# Patient Record
Sex: MALE | Race: WHITE | URBAN - METROPOLITAN AREA
[De-identification: names, ages, dates, MRNs, and addresses within clinical notes are randomized per-mention and may not be internally consistent; named-entity substitution may affect disease eponyms.]

---

## 2019-01-01 ENCOUNTER — NURSING HOME VISIT (OUTPATIENT)
Dept: GERIATRICS | Facility: CLINIC | Age: 84
End: 2019-01-01
Payer: COMMERCIAL

## 2019-01-01 ENCOUNTER — HOSPITAL LABORATORY (OUTPATIENT)
Dept: NURSING HOME | Facility: OTHER | Age: 84
End: 2019-01-01

## 2019-01-01 ENCOUNTER — NURSING HOME VISIT (OUTPATIENT)
Dept: SURGERY | Facility: CLINIC | Age: 84
End: 2019-01-01
Payer: COMMERCIAL

## 2019-01-01 ENCOUNTER — TELEPHONE (OUTPATIENT)
Dept: GERIATRICS | Facility: CLINIC | Age: 84
End: 2019-01-01

## 2019-01-01 VITALS
HEART RATE: 84 BPM | OXYGEN SATURATION: 98 % | WEIGHT: 212.4 LBS | DIASTOLIC BLOOD PRESSURE: 65 MMHG | RESPIRATION RATE: 20 BRPM | SYSTOLIC BLOOD PRESSURE: 137 MMHG | TEMPERATURE: 96.8 F | HEIGHT: 74 IN | BODY MASS INDEX: 27.26 KG/M2

## 2019-01-01 VITALS
SYSTOLIC BLOOD PRESSURE: 141 MMHG | RESPIRATION RATE: 20 BRPM | WEIGHT: 206.4 LBS | BODY MASS INDEX: 26.49 KG/M2 | DIASTOLIC BLOOD PRESSURE: 79 MMHG | OXYGEN SATURATION: 98 % | HEART RATE: 86 BPM | TEMPERATURE: 97.3 F | HEIGHT: 74 IN

## 2019-01-01 VITALS
TEMPERATURE: 96.3 F | DIASTOLIC BLOOD PRESSURE: 74 MMHG | BODY MASS INDEX: 25.77 KG/M2 | HEIGHT: 74 IN | RESPIRATION RATE: 18 BRPM | WEIGHT: 200.8 LBS | OXYGEN SATURATION: 94 % | SYSTOLIC BLOOD PRESSURE: 137 MMHG | HEART RATE: 81 BPM

## 2019-01-01 VITALS
TEMPERATURE: 98.4 F | RESPIRATION RATE: 17 BRPM | SYSTOLIC BLOOD PRESSURE: 132 MMHG | WEIGHT: 200.8 LBS | HEART RATE: 76 BPM | DIASTOLIC BLOOD PRESSURE: 78 MMHG | BODY MASS INDEX: 25.78 KG/M2 | OXYGEN SATURATION: 94 %

## 2019-01-01 VITALS
WEIGHT: 212.4 LBS | SYSTOLIC BLOOD PRESSURE: 118 MMHG | TEMPERATURE: 97.7 F | HEIGHT: 74 IN | HEART RATE: 75 BPM | DIASTOLIC BLOOD PRESSURE: 50 MMHG | BODY MASS INDEX: 27.26 KG/M2 | RESPIRATION RATE: 18 BRPM | OXYGEN SATURATION: 98 %

## 2019-01-01 VITALS
HEART RATE: 70 BPM | TEMPERATURE: 97.2 F | RESPIRATION RATE: 16 BRPM | SYSTOLIC BLOOD PRESSURE: 125 MMHG | OXYGEN SATURATION: 97 % | DIASTOLIC BLOOD PRESSURE: 64 MMHG | WEIGHT: 198.4 LBS | BODY MASS INDEX: 25.46 KG/M2 | HEIGHT: 74 IN

## 2019-01-01 DIAGNOSIS — L92.9 HYPERGRANULATION: ICD-10-CM

## 2019-01-01 DIAGNOSIS — N13.9 OBSTRUCTION, UROPATHY: ICD-10-CM

## 2019-01-01 DIAGNOSIS — L20.9 ATOPIC DERMATITIS, UNSPECIFIED TYPE: ICD-10-CM

## 2019-01-01 DIAGNOSIS — Z87.820 HISTORY OF CONCUSSION: ICD-10-CM

## 2019-01-01 DIAGNOSIS — M79.604 PAIN OF RIGHT LOWER EXTREMITY: ICD-10-CM

## 2019-01-01 DIAGNOSIS — Z87.440 HISTORY OF RECURRENT UTIS: ICD-10-CM

## 2019-01-01 DIAGNOSIS — G80.2 SPASTIC HEMIPLEGIC CEREBRAL PALSY (H): Primary | ICD-10-CM

## 2019-01-01 DIAGNOSIS — Z93.59 SUPRAPUBIC CATHETER (H): ICD-10-CM

## 2019-01-01 DIAGNOSIS — G80.2 SPASTIC HEMIPLEGIC CEREBRAL PALSY (H): ICD-10-CM

## 2019-01-01 DIAGNOSIS — I10 ESSENTIAL HYPERTENSION: ICD-10-CM

## 2019-01-01 DIAGNOSIS — L03.114 CELLULITIS OF LEFT UPPER EXTREMITY: ICD-10-CM

## 2019-01-01 DIAGNOSIS — I71.40 ABDOMINAL AORTIC ANEURYSM (AAA) WITHOUT RUPTURE (H): ICD-10-CM

## 2019-01-01 DIAGNOSIS — L30.9 DERMATITIS: ICD-10-CM

## 2019-01-01 DIAGNOSIS — L02.92 BOIL: Primary | ICD-10-CM

## 2019-01-01 DIAGNOSIS — N18.30 CKD (CHRONIC KIDNEY DISEASE) STAGE 3, GFR 30-59 ML/MIN (H): ICD-10-CM

## 2019-01-01 DIAGNOSIS — R39.89 POSSIBLE URINARY TRACT INFECTION: ICD-10-CM

## 2019-01-01 DIAGNOSIS — R41.89 COGNITIVE IMPAIRMENT: ICD-10-CM

## 2019-01-01 DIAGNOSIS — Z98.890 S/P CAROTID ENDARTERECTOMY: ICD-10-CM

## 2019-01-01 DIAGNOSIS — I25.83 CORONARY ATHEROSCLEROSIS DUE TO LIPID RICH PLAQUE: ICD-10-CM

## 2019-01-01 DIAGNOSIS — M62.81 GENERALIZED MUSCLE WEAKNESS: ICD-10-CM

## 2019-01-01 DIAGNOSIS — R41.0 ACUTE CONFUSION: Primary | ICD-10-CM

## 2019-01-01 DIAGNOSIS — Z86.14 HX MRSA INFECTION: ICD-10-CM

## 2019-01-01 DIAGNOSIS — L02.92 BOIL: ICD-10-CM

## 2019-01-01 DIAGNOSIS — R31.9 HEMATURIA, UNSPECIFIED TYPE: ICD-10-CM

## 2019-01-01 DIAGNOSIS — R54 FRAILTY: ICD-10-CM

## 2019-01-01 DIAGNOSIS — Z43.5 ENCOUNTER FOR SUPRAPUBIC CATHETER CARE (H): Primary | ICD-10-CM

## 2019-01-01 DIAGNOSIS — R54 FRAIL ELDERLY: ICD-10-CM

## 2019-01-01 LAB
ALBUMIN UR-MCNC: 100 MG/DL
ANION GAP SERPL CALCULATED.3IONS-SCNC: 7 MMOL/L (ref 3–14)
ANION GAP SERPL CALCULATED.3IONS-SCNC: 9 MMOL/L (ref 3–14)
APPEARANCE UR: ABNORMAL
BACTERIA #/AREA URNS HPF: ABNORMAL /HPF
BACTERIA SPEC CULT: ABNORMAL
BASOPHILS # BLD AUTO: 0 10E9/L (ref 0–0.2)
BASOPHILS # BLD AUTO: 0 10E9/L (ref 0–0.2)
BASOPHILS NFR BLD AUTO: 0.3 %
BASOPHILS NFR BLD AUTO: 0.4 %
BILIRUB UR QL STRIP: NEGATIVE
BUN SERPL-MCNC: 15 MG/DL (ref 7–30)
BUN SERPL-MCNC: 21 MG/DL (ref 7–30)
CALCIUM SERPL-MCNC: 8.9 MG/DL (ref 8.5–10.1)
CALCIUM SERPL-MCNC: 9.4 MG/DL (ref 8.5–10.1)
CAOX CRY #/AREA URNS HPF: ABNORMAL /HPF
CHLORIDE SERPL-SCNC: 106 MMOL/L (ref 94–109)
CHLORIDE SERPL-SCNC: 111 MMOL/L (ref 94–109)
CO2 SERPL-SCNC: 25 MMOL/L (ref 20–32)
CO2 SERPL-SCNC: 28 MMOL/L (ref 20–32)
COLOR UR AUTO: YELLOW
CREAT SERPL-MCNC: 0.94 MG/DL (ref 0.66–1.25)
CREAT SERPL-MCNC: 1.38 MG/DL (ref 0.66–1.25)
DIFFERENTIAL METHOD BLD: ABNORMAL
DIFFERENTIAL METHOD BLD: ABNORMAL
EOSINOPHIL NFR BLD AUTO: 10.9 %
EOSINOPHIL NFR BLD AUTO: 7 %
ERYTHROCYTE [DISTWIDTH] IN BLOOD BY AUTOMATED COUNT: 15.9 % (ref 10–15)
ERYTHROCYTE [DISTWIDTH] IN BLOOD BY AUTOMATED COUNT: 17.5 % (ref 10–15)
ERYTHROCYTE [DISTWIDTH] IN BLOOD BY AUTOMATED COUNT: 17.7 % (ref 10–15)
GFR SERPL CREATININE-BSD FRML MDRD: 46 ML/MIN/{1.73_M2}
GFR SERPL CREATININE-BSD FRML MDRD: 72 ML/MIN/{1.73_M2}
GLUCOSE SERPL-MCNC: 76 MG/DL (ref 70–99)
GLUCOSE SERPL-MCNC: 83 MG/DL (ref 70–99)
GLUCOSE UR STRIP-MCNC: NEGATIVE MG/DL
HCT VFR BLD AUTO: 30.1 % (ref 40–53)
HCT VFR BLD AUTO: 30.2 % (ref 40–53)
HCT VFR BLD AUTO: 30.3 % (ref 40–53)
HGB BLD-MCNC: 9.6 G/DL (ref 13.3–17.7)
HGB BLD-MCNC: 9.8 G/DL (ref 13.3–17.7)
HGB BLD-MCNC: 9.8 G/DL (ref 13.3–17.7)
HGB UR QL STRIP: ABNORMAL
HYALINE CASTS #/AREA URNS LPF: 8 /LPF (ref 0–2)
IMM GRANULOCYTES # BLD: 0 10E9/L (ref 0–0.4)
IMM GRANULOCYTES # BLD: 0 10E9/L (ref 0–0.4)
IMM GRANULOCYTES NFR BLD: 0.4 %
IMM GRANULOCYTES NFR BLD: 0.4 %
KETONES UR STRIP-MCNC: NEGATIVE MG/DL
LEUKOCYTE ESTERASE UR QL STRIP: ABNORMAL
LYMPHOCYTES # BLD AUTO: 1.2 10E9/L (ref 0.8–5.3)
LYMPHOCYTES # BLD AUTO: 1.6 10E9/L (ref 0.8–5.3)
LYMPHOCYTES NFR BLD AUTO: 16.5 %
LYMPHOCYTES NFR BLD AUTO: 21.2 %
Lab: ABNORMAL
MCH RBC QN AUTO: 27.6 PG (ref 26.5–33)
MCH RBC QN AUTO: 28 PG (ref 26.5–33)
MCH RBC QN AUTO: 28.2 PG (ref 26.5–33)
MCHC RBC AUTO-ENTMCNC: 31.8 G/DL (ref 31.5–36.5)
MCHC RBC AUTO-ENTMCNC: 32.3 G/DL (ref 31.5–36.5)
MCHC RBC AUTO-ENTMCNC: 32.6 G/DL (ref 31.5–36.5)
MCV RBC AUTO: 87 FL (ref 78–100)
MONOCYTES # BLD AUTO: 0.7 10E9/L (ref 0–1.3)
MONOCYTES # BLD AUTO: 1 10E9/L (ref 0–1.3)
MONOCYTES NFR BLD AUTO: 10 %
MONOCYTES NFR BLD AUTO: 13.4 %
NEUTROPHILS # BLD AUTO: 4.3 10E9/L (ref 1.6–8.3)
NEUTROPHILS # BLD AUTO: 4.5 10E9/L (ref 1.6–8.3)
NEUTROPHILS NFR BLD AUTO: 58.5 %
NEUTROPHILS NFR BLD AUTO: 61 %
NITRATE UR QL: NEGATIVE
NRBC # BLD AUTO: 0 10*3/UL
NRBC # BLD AUTO: 0 10*3/UL
NRBC BLD AUTO-RTO: 0 /100
NRBC BLD AUTO-RTO: 0 /100
PH UR STRIP: 6 PH (ref 5–7)
PLATELET # BLD AUTO: 198 10E9/L (ref 150–450)
PLATELET # BLD AUTO: 229 10E9/L (ref 150–450)
PLATELET # BLD AUTO: 236 10E9/L (ref 150–450)
POTASSIUM SERPL-SCNC: 4.1 MMOL/L (ref 3.4–5.3)
POTASSIUM SERPL-SCNC: 4.3 MMOL/L (ref 3.4–5.3)
RBC # BLD AUTO: 3.48 10E12/L (ref 4.4–5.9)
RBC # BLD AUTO: 3.48 10E12/L (ref 4.4–5.9)
RBC # BLD AUTO: 3.5 10E12/L (ref 4.4–5.9)
RBC #/AREA URNS AUTO: 147 /HPF (ref 0–2)
SODIUM SERPL-SCNC: 141 MMOL/L (ref 133–144)
SODIUM SERPL-SCNC: 145 MMOL/L (ref 133–144)
SOURCE: ABNORMAL
SP GR UR STRIP: 1.01 (ref 1–1.03)
SPECIMEN SOURCE: ABNORMAL
TRANS CELLS #/AREA URNS HPF: 2 /HPF
UROBILINOGEN UR STRIP-MCNC: 0 MG/DL (ref 0–2)
WBC # BLD AUTO: 6.4 10E9/L (ref 4–11)
WBC # BLD AUTO: 7.3 10E9/L (ref 4–11)
WBC # BLD AUTO: 7.3 10E9/L (ref 4–11)
WBC #/AREA URNS AUTO: >182 /HPF (ref 0–5)
WBC CLUMPS #/AREA URNS HPF: PRESENT /HPF

## 2019-01-01 PROCEDURE — 99304 1ST NF CARE SF/LOW MDM 25: CPT | Mod: 25 | Performed by: SPECIALIST

## 2019-01-01 PROCEDURE — 99309 SBSQ NF CARE MODERATE MDM 30: CPT | Performed by: NURSE PRACTITIONER

## 2019-01-01 PROCEDURE — 99305 1ST NF CARE MODERATE MDM 35: CPT | Performed by: SPECIALIST

## 2019-01-01 PROCEDURE — 10060 I&D ABSCESS SIMPLE/SINGLE: CPT | Performed by: NURSE PRACTITIONER

## 2019-01-01 PROCEDURE — 17250 CHEM CAUT OF GRANLTJ TISSUE: CPT | Performed by: SPECIALIST

## 2019-01-01 PROCEDURE — 99309 SBSQ NF CARE MODERATE MDM 30: CPT | Mod: 25 | Performed by: NURSE PRACTITIONER

## 2019-01-01 PROCEDURE — 99309 SBSQ NF CARE MODERATE MDM 30: CPT | Performed by: FAMILY MEDICINE

## 2019-01-01 PROCEDURE — 99308 SBSQ NF CARE LOW MDM 20: CPT | Performed by: NURSE PRACTITIONER

## 2019-01-01 RX ORDER — SULFAMETHOXAZOLE/TRIMETHOPRIM 800-160 MG
1 TABLET ORAL 2 TIMES DAILY
COMMUNITY
Start: 2019-01-01 | End: 2019-01-01

## 2019-01-01 RX ORDER — CEFDINIR 300 MG/1
300 CAPSULE ORAL 2 TIMES DAILY
COMMUNITY
End: 2019-01-01

## 2019-01-01 RX ORDER — MINERAL OIL/HYDROPHIL PETROLAT
OINTMENT (GRAM) TOPICAL 2 TIMES DAILY
COMMUNITY
End: 2019-01-01

## 2019-01-01 RX ORDER — RIFAMPIN 300 MG/1
300 CAPSULE ORAL 2 TIMES DAILY
COMMUNITY
Start: 2019-01-01 | End: 2020-01-01

## 2019-01-01 RX ORDER — LIDOCAINE 40 MG/G
CREAM TOPICAL 2 TIMES DAILY
Status: ON HOLD | COMMUNITY
End: 2020-01-01

## 2019-01-01 RX ORDER — MOMETASONE FUROATE 1 MG/G
OINTMENT TOPICAL 2 TIMES DAILY
Status: ON HOLD | COMMUNITY
End: 2020-01-01

## 2019-01-01 RX ORDER — ACETAMINOPHEN 500 MG
1000 TABLET ORAL 3 TIMES DAILY PRN
Status: ON HOLD
Start: 2019-01-01 | End: 2020-01-01

## 2019-01-01 ASSESSMENT — MIFFLIN-ST. JEOR
SCORE: 1644.69
SCORE: 1680.97
SCORE: 1708.19
SCORE: 1655.57
SCORE: 1708.19

## 2019-03-12 ENCOUNTER — NURSING HOME VISIT (OUTPATIENT)
Dept: GERIATRICS | Facility: CLINIC | Age: 84
End: 2019-03-12
Payer: COMMERCIAL

## 2019-03-12 DIAGNOSIS — I25.83 CORONARY ATHEROSCLEROSIS DUE TO LIPID RICH PLAQUE: ICD-10-CM

## 2019-03-12 DIAGNOSIS — L89.153 DECUBITUS ULCER OF COCCYX, STAGE III (H): ICD-10-CM

## 2019-03-12 DIAGNOSIS — I71.40 ABDOMINAL AORTIC ANEURYSM (AAA) WITHOUT RUPTURE (H): ICD-10-CM

## 2019-03-12 DIAGNOSIS — B37.2 CANDIDIASIS OF SKIN: ICD-10-CM

## 2019-03-12 DIAGNOSIS — Z98.890 S/P CAROTID ENDARTERECTOMY: ICD-10-CM

## 2019-03-12 DIAGNOSIS — N13.9 OBSTRUCTION, UROPATHY: ICD-10-CM

## 2019-03-12 DIAGNOSIS — I10 ESSENTIAL HYPERTENSION: ICD-10-CM

## 2019-03-12 DIAGNOSIS — G80.2 SPASTIC HEMIPLEGIC CEREBRAL PALSY (H): Primary | ICD-10-CM

## 2019-03-12 DIAGNOSIS — S06.0X0D CONCUSSION WITHOUT LOSS OF CONSCIOUSNESS, SUBSEQUENT ENCOUNTER: ICD-10-CM

## 2019-03-12 PROBLEM — G80.9 CEREBRAL PALSY (H): Status: ACTIVE | Noted: 2019-03-02

## 2019-03-12 PROBLEM — I25.10 CORONARY ATHEROSCLEROSIS: Status: ACTIVE | Noted: 2018-09-06
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
## 2019-03-12 PROBLEM — S06.0XAA CONCUSSION: Status: ACTIVE | Noted: 2019-03-02

## 2019-03-12 PROBLEM — R33.9 RETENTION OF URINE: Status: ACTIVE | Noted: 2019-03-12

## 2019-03-12 PROCEDURE — 99310 SBSQ NF CARE HIGH MDM 45: CPT | Performed by: NURSE PRACTITIONER

## 2019-03-12 NOTE — PROGRESS NOTES
[facility]  PRIMARY CARE PROVIDER AND CLINIC:  [facility], [address] / [place] [address]  Chief Complaint   Patient presents with     Hospital F/U     Clinic Care Coordination - Initial     [facility] Medical Record Number:  [id number]  Place of Service where encounter took place:  [facility] ([facility] [[id number]]    Diallo Villarreal  is a 86 year old  (8/31/1932), admitted to the above facility from  [facility] . Hospital stay 3/2/19 through 3/8/19..  Admitted to this facility for  rehab, medical management and nursing care.    HPI:    HPI information obtained from: facility chart records, facility staff, patient report and Care Everywhere Epic chart review.   Brief Summary of Hospital Course: Reviewed discharge summary from [facility].  Diallo lives in a group home setting and admitted due to post concussion symptoms with disequilibrium.  While he was ambulating to the bathroom he sustained a fall where he hit his head.  CT of head, cervical spine and temporal bones were negative for fracture or acute bleed.  Found to be hypovolumic.  Fluids replaced and diuretic held.  Able to participate in therapies and so recommended for rehab for strengthening and mobility.  Diallo had stenting in 2018 for CAD.  Continued beta blocker, ASA, Plavix and statin.    Armendarzi present due to retention and on a alpha blocker.    Updates on Status Since Skilled nursing Admission: - today nursing asked this NP to see Diallo right away before staff got up for the day due to his skin condition.  Multiple alterations on his legs, feet, coccyx and groin/periarea.    Diallo was laying in bed, awake and answering questions.  Asked him if he had contractures of his hands but able to spread out fingers.  Does have Cerebral Palsy and chronic right sided weakness which gives him trouble walking.    Goal at this time is to utilize rehab.  Possible long term.      CODE  STATUS/ADVANCE DIRECTIVES DISCUSSION:   DNR / DNI  Patient's living condition: group home setting  ALLERGIES: Gluten meal and Wheat extract  PAST MEDICAL HISTORY:  has no past medical history on file.  PAST SURGICAL HISTORY:   has no past surgical history on file.  FAMILY HISTORY: family history is not on file.  SOCIAL HISTORY:       Post Discharge Medication Reconciliation Status: discharge medications reconciled, continue medications without change  Did add other medications for his skin    Current Outpatient Medications   Medication Sig Dispense Refill     albuterol (PROVENTIL) (2.5 MG/3ML) 0.083% neb solution Take 2.5 mg by nebulization every 4 hours as needed for shortness of breath / dyspnea or wheezing       aspirin 81 MG EC tablet Take 81 mg by mouth daily       atorvastatin (LIPITOR) 40 MG tablet Take 40 mg by mouth daily       cinacalcet (SENSIPAR) 30 MG tablet Take 30 mg by mouth daily       clopidogrel (PLAVIX) 75 MG tablet Take 75 mg by mouth daily       clotrimazole (LOTRIMIN) 1 % external cream Apply topically 2 times daily       clotrimazole-betamethasone (LOTRISONE) 1-0.05 % external cream Apply topically 2 times daily       doxazosin (CARDURA) 1 MG tablet Take 0.5 mg by mouth daily       ferrous sulfate (FEROSUL) 325 (65 Fe) MG tablet Take 325 mg by mouth daily (with breakfast)       furosemide (LASIX) 20 MG tablet Take 20 mg by mouth daily       metoprolol tartrate (LOPRESSOR) 50 MG tablet Take 50 mg by mouth 2 times daily       nitroGLYcerin (NITROSTAT) 0.4 MG sublingual tablet Place 0.4 mg under the tongue every 5 minutes as needed for chest pain For chest pain place 1 tablet under the tongue every 5 minutes for 3 doses. If symptoms persist 5 minutes after 1st dose call 911.       pantoprazole sodium (PROTONIX) 40 MG packet Take 1 packet by mouth daily       senna-docusate (SENOKOT-S/PERICOLACE) 8.6-50 MG tablet Take 2 tablets by mouth 2 times daily as needed for constipation       tamsulosin  "(FLOMAX) 0.4 MG capsule Take 0.4 mg by mouth daily       vitamin D3 (CHOLECALCIFEROL) 1000 units (25 mcg) tablet Take 2,000 Units by mouth daily         ROS:  10 point ROS of systems including Constitutional, Eyes, Respiratory, Cardiovascular, Gastroenterology, Genitourinary, Integumentary, Musculoskeletal, Psychiatric were all negative except for pertinent positives noted in my HPI.  Able to answer simple questions    Vitals:  /64   Pulse 70   Temp 96.4  F (35.8  C)   Resp 16   Ht 1.88 m (6' 2\")   Wt 92.3 kg (203 lb 8 oz)   SpO2 95%   BMI 26.13 kg/m    Exam:  GENERAL APPEARANCE:  Alert, in no distress, appears healthy, cooperative  EYES:  PERRL, Conjunctiva and lids normal  RESP:  respiratory effort and palpation of chest normal, no respiratory distress, lungs are clear in the bases but by the bronchial tree, do hear mild wheezing and congestion.  no coughing.  hx of smoking 40 years.  suspect underlying COPD according to discharge summaries  CV:  Palpation and auscultation of heart done , no edema, heart rate regular/irregular  ABDOMEN:  normal bowel sounds, soft, nontender, no hepatosplenomegaly or other masses, no guarding or rebound  M/S:   Gait and station abnormal right sided weakness, assist of one with transfers and bed mobility, propelled in w/c  SKIN:  resolving bruising around left eye.  Skin is pink/pale.  On his coccyx area there is a stage 3 pressure sore that base of skin pink and edges white.  No dressing was present.  Did have two other nurses go into see the area to agree on what the area is to be called.  Felt it was a dimple but the nurses felt to be a pressure sore.  In hospital records it is recorded as a community acquired pressure sore stage 3.  Back of left knee and top of left foot is deeper pink and dry/irritated.    Right thigh has a large deep pink raised plaque - fungus that is irritated.  Dry elbows.  No issues with heels  Bilateral groin and jose-area is deep pink, " moist/drainage and crusting on the edges.    At the bend of the feet is dry scale skin  PSYCH:  oriented to person and place with time vague, affect and mood normal    Lab/Diagnostic data:  3/8/19 through 3/2/19  7.5 (L) 8.7 (L) 7.9 (L) 8.1 (L) 7.2 (L) 8.0 (L)     1/11/19  BNP = 238      ASSESSMENT/PLAN:  Current [place] scheduled appointments:   none    Spastic hemiplegic cerebral palsy (H)  Chronic right sided weakness from his CP.    No medications.    Will be receiving rehab of PT/OT for mobility, strengthening, exercises and ADLs.  [place] that he may be long term.  Family has toured this facility prior to Diallo coming here.    Assist with cares, transfers and w/c.      Concussion without loss of consciousness, subsequent encounter  According to the discharge summary his equilibrium imbalance resolved before leaving hospital.    Continue to monitor for acute symptoms.  Able to tolerate the lights in his room this morning.  Able to express his needs and answer questions.    Coronary atherosclerosis due to lipid rich plaque  S/P carotid endarterectomy  Remains on ASA, Plavix and atorvastatin daily.    Noted to have a AAA without rupture as well.  This has been chosen not to surgically correct according to discharge paperwork.    Will check a CBC, BMP, TSH, and lipid panel on Friday.    Does have nitroglycerin PRN for angina  Will add x3 to the order  Every 5 minutes prn x3.    Obstruction, uropathy  Came with a catheter for retention.  According to staff that he can not have a suprapubic catheter for sometime this summer due to being on anticoagulants.  Staff will care for catheter bag as well as the skin on his penis due to how his groin and jose-area appears.  Is on doxazosin 0.5mg daily and tamsulosin 0.4mg daily.        Candidiasis of skin  Decubitus of coccyx, stage 3 - community acquired  Multiple issues to address:  For his thigh - will use clobetasol cream twice a day for 14 days.  Cavilon extra dry skin  cream for dry skin on feet and ankle area.  For the groin and jose area, clotrimazole 1% BID for 14 days  For the coccyx area will keep clean and apply Cavilon wipe twice a day.  Continue to monitor as will need to change tx as needed.    Essential hypertension  Remains on Lopressor 50mg twice a day and has Lasix 20mg daily.  No edema.      Abdominal aortic aneurysm (AAA) without rupture (H)  Continue to be aware that he has a AAA that measures 6.4cm back in 2018.       Orders written by provider at facility and transcribed by : Magaly Polo  1.  Catheter for obstructive uropathy.  Add x 3 to nitroglycerin order.  2.  Discontinue albuterol inhaler.  3.  Albuterol neb 1 sunni Q4H PRN.  Dx: wheezing, congestion  4.  CBC, BMP, TSH, and Lipids on 3/15/19 for CAD, HTN     Total time spent with patient visit at the skilled nursing facility was 40 minutes including patient visit, review of past records and discussion with nursing. Greater than 50% of total time spent with counseling and coordinating care due to skin issues, clarifying medications and new patient orientation     Electronically signed by:  ROCÍO Pena CNP

## 2019-03-13 VITALS
OXYGEN SATURATION: 95 % | HEIGHT: 74 IN | SYSTOLIC BLOOD PRESSURE: 127 MMHG | BODY MASS INDEX: 26.12 KG/M2 | TEMPERATURE: 96.4 F | HEART RATE: 70 BPM | WEIGHT: 203.5 LBS | RESPIRATION RATE: 16 BRPM | DIASTOLIC BLOOD PRESSURE: 64 MMHG

## 2019-03-13 RX ORDER — FERROUS SULFATE 325(65) MG
325 TABLET ORAL
Status: ON HOLD | COMMUNITY
End: 2020-01-01

## 2019-03-13 RX ORDER — CLOPIDOGREL BISULFATE 75 MG/1
75 TABLET ORAL DAILY
COMMUNITY
End: 2019-08-09

## 2019-03-13 RX ORDER — CLOTRIMAZOLE 1 %
CREAM (GRAM) TOPICAL 2 TIMES DAILY
COMMUNITY
End: 2019-08-09

## 2019-03-13 RX ORDER — ALBUTEROL SULFATE 0.83 MG/ML
2.5 SOLUTION RESPIRATORY (INHALATION) EVERY 4 HOURS PRN
COMMUNITY
End: 2020-01-01

## 2019-03-13 RX ORDER — ATORVASTATIN CALCIUM 40 MG/1
40 TABLET, FILM COATED ORAL DAILY
COMMUNITY
End: 2020-01-01

## 2019-03-13 RX ORDER — PANTOPRAZOLE SODIUM 40 MG/1
1 FOR SUSPENSION ORAL DAILY
COMMUNITY
End: 2019-03-20

## 2019-03-13 RX ORDER — METOPROLOL TARTRATE 50 MG
50 TABLET ORAL 2 TIMES DAILY
Status: ON HOLD | COMMUNITY
End: 2020-01-01

## 2019-03-13 RX ORDER — FUROSEMIDE 20 MG
10 TABLET ORAL DAILY
Status: ON HOLD | COMMUNITY
End: 2020-01-01

## 2019-03-13 RX ORDER — TAMSULOSIN HYDROCHLORIDE 0.4 MG/1
0.4 CAPSULE ORAL DAILY
COMMUNITY
End: 2019-09-05

## 2019-03-13 RX ORDER — NITROGLYCERIN 0.4 MG/1
0.4 TABLET SUBLINGUAL EVERY 5 MIN PRN
Status: ON HOLD | COMMUNITY
End: 2020-01-01

## 2019-03-13 RX ORDER — ASPIRIN 81 MG/1
81 TABLET ORAL DAILY
Status: ON HOLD | COMMUNITY
End: 2020-01-01

## 2019-03-13 RX ORDER — CLOTRIMAZOLE AND BETAMETHASONE DIPROPIONATE 10; .64 MG/G; MG/G
CREAM TOPICAL 2 TIMES DAILY
COMMUNITY
End: 2019-04-02

## 2019-03-13 RX ORDER — AMOXICILLIN 250 MG
2 CAPSULE ORAL 2 TIMES DAILY PRN
COMMUNITY
End: 2019-03-20

## 2019-03-13 RX ORDER — DOXAZOSIN 1 MG/1
0.5 TABLET ORAL DAILY
COMMUNITY
End: 2019-09-05

## 2019-03-13 RX ORDER — CINACALCET 30 MG/1
30 TABLET, FILM COATED ORAL DAILY
Status: ON HOLD | COMMUNITY
End: 2020-01-01

## 2019-03-13 ASSESSMENT — MIFFLIN-ST. JEOR: SCORE: 1672.82

## 2019-03-14 ENCOUNTER — NURSING HOME VISIT (OUTPATIENT)
Dept: GERIATRICS | Facility: CLINIC | Age: 84
End: 2019-03-14
Payer: COMMERCIAL

## 2019-03-14 VITALS
RESPIRATION RATE: 16 BRPM | HEIGHT: 74 IN | SYSTOLIC BLOOD PRESSURE: 141 MMHG | OXYGEN SATURATION: 94 % | HEART RATE: 73 BPM | WEIGHT: 202 LBS | BODY MASS INDEX: 25.93 KG/M2 | TEMPERATURE: 96.3 F | DIASTOLIC BLOOD PRESSURE: 70 MMHG

## 2019-03-14 DIAGNOSIS — B37.2 CANDIDIASIS OF SKIN: Primary | ICD-10-CM

## 2019-03-14 DIAGNOSIS — L89.153 DECUBITUS ULCER OF COCCYX, STAGE III (H): ICD-10-CM

## 2019-03-14 PROCEDURE — 99309 SBSQ NF CARE MODERATE MDM 30: CPT | Performed by: NURSE PRACTITIONER

## 2019-03-14 ASSESSMENT — MIFFLIN-ST. JEOR: SCORE: 1666.02

## 2019-03-14 NOTE — PROGRESS NOTES
[facility]  [facility] Medical Record Number:  [id number]  Place of Service where encounter took place[facility] [[id number][facility]  Chief Complaint   Patient presents with     Nursing Home Acute       HPI:    Diallo Villarreal  is a 86 year old (8/31/1932), who is being seen today for an episodic care visit.  HPI information obtained from: facility staff and patient report. Today's concern is:    1. Candidiasis of skin    2. Decubitus ulcer of coccyx, stage III (H)      - came to see Diallo today due to he was about to get a shower and the NA/R asked nursing to come look at his skin per their protocol and asked if this NP wanted to see.    Saw Diallo 2 days ago and his skin looked painful with the redness, drainage, irritation.    Past Medical and Surgical History reviewed in Epic today.    MEDICATIONS:  Current Outpatient Medications   Medication Sig Dispense Refill     albuterol (PROVENTIL) (2.5 MG/3ML) 0.083% neb solution Take 2.5 mg by nebulization every 4 hours as needed for shortness of breath / dyspnea or wheezing       aspirin 81 MG EC tablet Take 81 mg by mouth daily       atorvastatin (LIPITOR) 40 MG tablet Take 40 mg by mouth daily       cinacalcet (SENSIPAR) 30 MG tablet Take 30 mg by mouth daily       clopidogrel (PLAVIX) 75 MG tablet Take 75 mg by mouth daily       clotrimazole (LOTRIMIN) 1 % external cream Apply topically 2 times daily       clotrimazole-betamethasone (LOTRISONE) 1-0.05 % external cream Apply topically 2 times daily       doxazosin (CARDURA) 1 MG tablet Take 0.5 mg by mouth daily       ferrous sulfate (FEROSUL) 325 (65 Fe) MG tablet Take 325 mg by mouth daily (with breakfast)       furosemide (LASIX) 20 MG tablet Take 20 mg by mouth daily       metoprolol tartrate (LOPRESSOR) 50 MG tablet Take 50 mg by mouth 2 times daily       nitroGLYcerin (NITROSTAT) 0.4 MG sublingual tablet Place 0.4 mg under the tongue every 5 minutes as needed for chest pain  "For chest pain place 1 tablet under the tongue every 5 minutes for 3 doses. If symptoms persist 5 minutes after 1st dose call 911.       pantoprazole sodium (PROTONIX) 40 MG packet Take 1 packet by mouth daily       senna-docusate (SENOKOT-S/PERICOLACE) 8.6-50 MG tablet Take 2 tablets by mouth 2 times daily as needed for constipation       tamsulosin (FLOMAX) 0.4 MG capsule Take 0.4 mg by mouth daily       vitamin D3 (CHOLECALCIFEROL) 1000 units (25 mcg) tablet Take 2,000 Units by mouth daily         REVIEW OF SYSTEMS:  4 point ROS including Respiratory, CV, GI and , other than that noted in the HPI,  is negative    Objective:  /70   Pulse 73   Temp 96.3  F (35.7  C)   Resp 16   Ht 1.88 m (6' 2\")   Wt 91.6 kg (202 lb)   SpO2 94%   BMI 25.94 kg/m    Exam:  GENERAL APPEARANCE:  Alert, in no distress, cooperative, laying in bed on his right side  RESP:  no respiratory distress, no use of oxygen.  respirations even and no cough  CV:  Palpation and auscultation of heart done , no edema, radial pulse regular and strong.  SKIN:  skin is pink/pale.  resolving bruising on left eye.  bend of ankles has dry flaking skin and able to peal off the dead skin.  back of left leg is pale pink and less defining outline.  patch on right thigh is less raised and paler pink.  groin skin is dryer, pink, and not as moist in appearance.  coccyx now looks more like a stage 3 than 2 days ago.  open area measures roughly 1cm in length and 2cm in width as he is laying on his side.  wound base is 50% slough and 50% granulation.  maciel skin intact.  no signs of infection.    Labs:   labs scheduled for 3/15/19    ASSESSMENT/PLAN:  Candidiasis of skin  Continue with creams ordered.  feel that there is healing already to a small degree.  Since creams are for 14 days will need to evaluate again towards end of order.  clotrimazole [OTC]  cream; 1 %; topical   Special Instructions: X14 DAYS *APPLY TO GROIN FOLDS/MACIEL AREA*  Twice A Day   " DC    CLOTRIMAZOLE BETAMETHASONE DIPROPLONATE  cream; 1%/0.05%; amt: -; topical   Special Instructions: APPLY TO BELLY BUTTON, TO BACK OF LEFT KNEE AND RIGHT THIGH **X14 DAYS  Twice A Day    Decubitus ulcer of coccyx, stage III (H)  - nurse came into the room to do her skin check and decision made to change the order from cavilon wipes twice a day to Mepilex 3 x 3 dressing to the decub to help with debridement, protection and healing.  This is changed every 3 days and PRN.  Prior to applying the Mepilex, area cleansed and cavilon wipe used to protect the skin.      Orders written by provider at facility and transcribed by : Magaly Polo  1.  Mepilix 3x3 Ref [id number] wt [id number] over buttocks ulcer.  Change Q3 days and/or PRN if soiled.  Apply Cavilon after cleansing when Mepilex changed.      Electronically signed by:  ROCÍO Pena CNP

## 2019-03-14 NOTE — LETTER
3/14/2019        RE: Diallo Villarreal  [address]  [address]  [place] [address]        [facility]  [facility] Medical Record Number:  [id number]  Place of Service where encounter took place:  [facility] ([facility] [[id number]]  Chief Complaint   Patient presents with     Nursing Home Acute       HPI:    Diallo Villarreal  is a 86 year old (8/31/1932), who is being seen today for an episodic care visit.  HPI information obtained from: facility staff and patient report. Today's concern is:    1. Candidiasis of skin    2. Decubitus ulcer of coccyx, stage III (H)      - came to see Diallo today due to he was about to get a shower and the NA/R asked nursing to come look at his skin per their protocol and asked if this NP wanted to see.    Saw Diallo 2 days ago and his skin looked painful with the redness, drainage, irritation.    Past Medical and Surgical History reviewed in Epic today.    MEDICATIONS:  Current Outpatient Medications   Medication Sig Dispense Refill     albuterol (PROVENTIL) (2.5 MG/3ML) 0.083% neb solution Take 2.5 mg by nebulization every 4 hours as needed for shortness of breath / dyspnea or wheezing       aspirin 81 MG EC tablet Take 81 mg by mouth daily       atorvastatin (LIPITOR) 40 MG tablet Take 40 mg by mouth daily       cinacalcet (SENSIPAR) 30 MG tablet Take 30 mg by mouth daily       clopidogrel (PLAVIX) 75 MG tablet Take 75 mg by mouth daily       clotrimazole (LOTRIMIN) 1 % external cream Apply topically 2 times daily       clotrimazole-betamethasone (LOTRISONE) 1-0.05 % external cream Apply topically 2 times daily       doxazosin (CARDURA) 1 MG tablet Take 0.5 mg by mouth daily       ferrous sulfate (FEROSUL) 325 (65 Fe) MG tablet Take 325 mg by mouth daily (with breakfast)       furosemide (LASIX) 20 MG tablet Take 20 mg by mouth daily       metoprolol tartrate (LOPRESSOR) 50 MG tablet Take 50 mg by mouth 2 times daily       nitroGLYcerin (NITROSTAT)  "0.4 MG sublingual tablet Place 0.4 mg under the tongue every 5 minutes as needed for chest pain For chest pain place 1 tablet under the tongue every 5 minutes for 3 doses. If symptoms persist 5 minutes after 1st dose call 911.       pantoprazole sodium (PROTONIX) 40 MG packet Take 1 packet by mouth daily       senna-docusate (SENOKOT-S/PERICOLACE) 8.6-50 MG tablet Take 2 tablets by mouth 2 times daily as needed for constipation       tamsulosin (FLOMAX) 0.4 MG capsule Take 0.4 mg by mouth daily       vitamin D3 (CHOLECALCIFEROL) 1000 units (25 mcg) tablet Take 2,000 Units by mouth daily         REVIEW OF SYSTEMS:  4 point ROS including Respiratory, CV, GI and , other than that noted in the HPI,  is negative    Objective:  /70   Pulse 73   Temp 96.3  F (35.7  C)   Resp 16   Ht 1.88 m (6' 2\")   Wt 91.6 kg (202 lb)   SpO2 94%   BMI 25.94 kg/m     Exam:  GENERAL APPEARANCE:  Alert, in no distress, cooperative, laying in bed on his right side  RESP:  no respiratory distress, no use of oxygen.  respirations even and no cough  CV:  Palpation and auscultation of heart done , no edema, radial pulse regular and strong.  SKIN:  skin is pink/pale.  resolving bruising on left eye.  bend of ankles has dry flaking skin and able to peal off the dead skin.  back of left leg is pale pink and less defining outline.  patch on right thigh is less raised and paler pink.  groin skin is dryer, pink, and not as moist in appearance.  coccyx now looks more like a stage 3 than 2 days ago.  open area measures roughly 1cm in length and 2cm in width as he is laying on his side.  wound base is 50% slough and 50% granulation.  jose skin intact.  no signs of infection.    Labs:   labs scheduled for 3/15/19    ASSESSMENT/PLAN:  Candidiasis of skin  Continue with creams ordered.  feel that there is healing already to a small degree.  Since creams are for 14 days will need to evaluate again towards end of order.  clotrimazole " [OTC]  cream; 1 %; topical   Special Instructions: X14 DAYS *APPLY TO GROIN FOLDS/MACIEL AREA*  Twice A Day   DC    CLOTRIMAZOLE BETAMETHASONE DIPROPLONATE  cream; 1%/0.05%; amt: -; topical   Special Instructions: APPLY TO BELLY BUTTON, TO BACK OF LEFT KNEE AND RIGHT THIGH **X14 DAYS  Twice A Day    Decubitus ulcer of coccyx, stage III (H)  - nurse came into the room to do her skin check and decision made to change the order from cavilon wipes twice a day to Mepilex 3 x 3 dressing to the decub to help with debridement, protection and healing.  This is changed every 3 days and PRN.  Prior to applying the Mepilex, area cleansed and cavilon wipe used to protect the skin.      Orders written by provider at facility and transcribed by : Magaly Polo  1.  Mepilix 3x3 Ref [id number] wt [id number] over buttocks ulcer.  Change Q3 days and/or PRN if soiled.  Apply Cavilon after cleansing when Mepilex changed.      Electronically signed by:  ROCÍO Pena CNP             Sincerely,        ROCÍO Pena CNP

## 2019-03-15 ENCOUNTER — NURSING HOME VISIT (OUTPATIENT)
Dept: GERIATRICS | Facility: CLINIC | Age: 84
End: 2019-03-15
Payer: COMMERCIAL

## 2019-03-15 ENCOUNTER — HOSPITAL LABORATORY (OUTPATIENT)
Dept: NURSING HOME | Facility: OTHER | Age: 84
End: 2019-03-15

## 2019-03-15 VITALS — WEIGHT: 202 LBS | BODY MASS INDEX: 25.94 KG/M2

## 2019-03-15 DIAGNOSIS — N13.9 OBSTRUCTION, UROPATHY: ICD-10-CM

## 2019-03-15 DIAGNOSIS — D64.9 NORMOCYTIC ANEMIA: Primary | ICD-10-CM

## 2019-03-15 DIAGNOSIS — I25.83 CORONARY ATHEROSCLEROSIS DUE TO LIPID RICH PLAQUE: ICD-10-CM

## 2019-03-15 LAB
ANION GAP SERPL CALCULATED.3IONS-SCNC: 8 MMOL/L (ref 3–14)
BUN SERPL-MCNC: 24 MG/DL (ref 7–30)
CALCIUM SERPL-MCNC: 8.3 MG/DL (ref 8.5–10.1)
CHLORIDE SERPL-SCNC: 109 MMOL/L (ref 94–109)
CHOLEST SERPL-MCNC: 88 MG/DL
CO2 SERPL-SCNC: 28 MMOL/L (ref 20–32)
CREAT SERPL-MCNC: 1.05 MG/DL (ref 0.66–1.25)
ERYTHROCYTE [DISTWIDTH] IN BLOOD BY AUTOMATED COUNT: 19.7 % (ref 10–15)
GFR SERPL CREATININE-BSD FRML MDRD: 64 ML/MIN/{1.73_M2}
GLUCOSE SERPL-MCNC: 81 MG/DL (ref 70–99)
HCT VFR BLD AUTO: 24.1 % (ref 40–53)
HDLC SERPL-MCNC: 46 MG/DL
HGB BLD-MCNC: 7.5 G/DL (ref 13.3–17.7)
LDLC SERPL CALC-MCNC: 32 MG/DL
MCH RBC QN AUTO: 26.1 PG (ref 26.5–33)
MCHC RBC AUTO-ENTMCNC: 31.1 G/DL (ref 31.5–36.5)
MCV RBC AUTO: 84 FL (ref 78–100)
NONHDLC SERPL-MCNC: 42 MG/DL
PLATELET # BLD AUTO: 261 10E9/L (ref 150–450)
POTASSIUM SERPL-SCNC: 3.5 MMOL/L (ref 3.4–5.3)
RBC # BLD AUTO: 2.87 10E12/L (ref 4.4–5.9)
SODIUM SERPL-SCNC: 145 MMOL/L (ref 133–144)
TRIGL SERPL-MCNC: 48 MG/DL
TSH SERPL DL<=0.005 MIU/L-ACNC: 1.34 MU/L (ref 0.4–4)
WBC # BLD AUTO: 8.4 10E9/L (ref 4–11)

## 2019-03-15 PROCEDURE — 99309 SBSQ NF CARE MODERATE MDM 30: CPT | Performed by: NURSE PRACTITIONER

## 2019-03-15 NOTE — LETTER
3/15/2019        RE: Diallo Villarreal  [address]  [address]  [place] [address]        [facility]  [facility] Record Number:  [id number]  Place of Service where encounter took place:  [facility] (S) [[id number]]  Chief Complaint   Patient presents with     Anemia       HPI:    Diallo Villarreal  is a 86 year old (8/31/1932), who is being seen today for an episodic care visit.  HPI information obtained from: facility staff, patient report, [facility] chart review and Care Everywhere [facility] chart review. Today's concern is:    Normocytic anemia  - labs done today to follow up with hospitalization as well as his medications.  HgB came back at 7.5  No abnormal since he was around the level in the hospital and had a transfusion on 3/3/19  Best HgB was on 3/5/19 = 8.7  Back down to 7.5 on 3/8/19  Obstruction, uropathy  Nursing reported to this NP that Diallo had some bleeding around his penis.  Staff state that his penis has irritated skin.  Thought about sending to urology but then there was talk that he had to wait until this summer to have a possible suprapubic catheter due to being on blood thinner.    Coronary atherosclerosis due to lipid rich plaque  Lipid panel done due to being on a statin.      Past Medical and Surgical History reviewed in Epic today.    MEDICATIONS:  Current Outpatient Medications   Medication Sig Dispense Refill     albuterol (PROVENTIL) (2.5 MG/3ML) 0.083% neb solution Take 2.5 mg by nebulization every 4 hours as needed for shortness of breath / dyspnea or wheezing       aspirin 81 MG EC tablet Take 81 mg by mouth daily       atorvastatin (LIPITOR) 40 MG tablet Take 40 mg by mouth daily       cinacalcet (SENSIPAR) 30 MG tablet Take 30 mg by mouth daily       clopidogrel (PLAVIX) 75 MG tablet Take 75 mg by mouth daily       clotrimazole (LOTRIMIN) 1 % external cream Apply topically 2 times daily       clotrimazole-betamethasone (LOTRISONE)  1-0.05 % external cream Apply topically 2 times daily       doxazosin (CARDURA) 1 MG tablet Take 0.5 mg by mouth daily       ferrous sulfate (FEROSUL) 325 (65 Fe) MG tablet Take 325 mg by mouth daily (with breakfast)       furosemide (LASIX) 20 MG tablet Take 20 mg by mouth daily       metoprolol tartrate (LOPRESSOR) 50 MG tablet Take 50 mg by mouth 2 times daily       nitroGLYcerin (NITROSTAT) 0.4 MG sublingual tablet Place 0.4 mg under the tongue every 5 minutes as needed for chest pain For chest pain place 1 tablet under the tongue every 5 minutes for 3 doses. If symptoms persist 5 minutes after 1st dose call 911.       pantoprazole sodium (PROTONIX) 40 MG packet Take 1 packet by mouth daily       senna-docusate (SENOKOT-S/PERICOLACE) 8.6-50 MG tablet Take 2 tablets by mouth 2 times daily as needed for constipation       tamsulosin (FLOMAX) 0.4 MG capsule Take 0.4 mg by mouth daily       vitamin D3 (CHOLECALCIFEROL) 1000 units (25 mcg) tablet Take 2,000 Units by mouth daily         REVIEW OF SYSTEMS:  4 point ROS including Respiratory, CV, GI and , other than that noted in the HPI,  is negative    Objective:  Wt 91.6 kg (202 lb)   BMI 25.94 kg/m     Exam:  GENERAL APPEARANCE:  Alert, in no distress, cooperative  RESP:  respiratory effort and palpation of chest normal, lungs clear to auscultation , no respiratory distress  CV:  Palpation and auscultation of heart done , regular rate and rhythm, no murmur, rub, or gallop, no edema  SKIN:  pale, warm and dry.  healing skin conditions  PSYCH:  memory impaired , affect and mood normal, oriented to person and time which is vague    Labs:   Component      Latest Ref Rng & Units 3/15/2019   Sodium      133 - 144 mmol/L 145 (H)   Potassium      3.4 - 5.3 mmol/L 3.5   Chloride      94 - 109 mmol/L 109   Carbon Dioxide      20 - 32 mmol/L 28   Anion Gap      3 - 14 mmol/L 8   Glucose      70 - 99 mg/dL 81   Urea Nitrogen      7 - 30 mg/dL 24   Creatinine      0.66 - 1.25  mg/dL 1.05   GFR Estimate      >60 mL/min/1.73:m2 64   GFR Estimate If Black      >60 mL/min/1.73:m2 74   Calcium      8.5 - 10.1 mg/dL 8.3 (L)   WBC      4.0 - 11.0 10e9/L 8.4   RBC Count      4.4 - 5.9 10e12/L 2.87 (L)   Hemoglobin      13.3 - 17.7 g/dL 7.5 (L)   Hematocrit      40.0 - 53.0 % 24.1 (L)   MCV      78 - 100 fl 84   MCH      26.5 - 33.0 pg 26.1 (L)   MCHC      31.5 - 36.5 g/dL 31.1 (L)   RDW      10.0 - 15.0 % 19.7 (H)   Platelet Count      150 - 450 10e9/L 261   Cholesterol      <200 mg/dL 88   Triglycerides      <150 mg/dL 48   HDL Cholesterol      >39 mg/dL 46   LDL Cholesterol Calculated      <100 mg/dL 32   Non HDL Cholesterol      <130 mg/dL 42   TSH      0.40 - 4.00 mU/L 1.34       ASSESSMENT/PLAN:  1. Normocytic anemia - currently on FeSO4 325mg daily.  Feel this is his baseline.    Will continue with FeSO4 daily.  CBC again on Monday to make sure Diallo is staying stable.   2. Obstruction, uropathy - continue to monitor output and status of penis.  Sounds like it needs to be cleaned thoroughly.  Staff to keep NP updated.       3. Coronary atherosclerosis due to lipid rich plaque - no changes with medication.  Has had a endarterectomy       Orders written by provider at facility  1.  CBC on Monday due to anemia HgB = 7.5      Electronically signed by:  ROCÍO Pena CNP             Sincerely,        ROCÍO Pena CNP

## 2019-03-15 NOTE — PROGRESS NOTES
[facility]  [facility] Medical Record Number:  [id number]  Place of Service where encounter took place:  [facility] ([facility] [[id number]]  Chief Complaint   Patient presents with     Anemia       HPI:    Diallo Villarreal  is a 86 year old (8/31/1932), who is being seen today for an episodic care visit.  HPI information obtained from: facility staff, patient report, [facility] chart review and Care Everywhere [facility] chart review. Today's concern is:    Normocytic anemia  - labs done today to follow up with hospitalization as well as his medications.  HgB came back at 7.5  No abnormal since he was around the level in the hospital and had a transfusion on 3/3/19  Best HgB was on 3/5/19 = 8.7  Back down to 7.5 on 3/8/19  Obstruction, uropathy  Nursing reported to this NP that Diallo had some bleeding around his penis.  Staff state that his penis has irritated skin.  Thought about sending to urology but then there was talk that he had to wait until this summer to have a possible suprapubic catheter due to being on blood thinner.    Coronary atherosclerosis due to lipid rich plaque  Lipid panel done due to being on a statin.      Past Medical and Surgical History reviewed in Epic today.    MEDICATIONS:  Current Outpatient Medications   Medication Sig Dispense Refill     albuterol (PROVENTIL) (2.5 MG/3ML) 0.083% neb solution Take 2.5 mg by nebulization every 4 hours as needed for shortness of breath / dyspnea or wheezing       aspirin 81 MG EC tablet Take 81 mg by mouth daily       atorvastatin (LIPITOR) 40 MG tablet Take 40 mg by mouth daily       cinacalcet (SENSIPAR) 30 MG tablet Take 30 mg by mouth daily       clopidogrel (PLAVIX) 75 MG tablet Take 75 mg by mouth daily       clotrimazole (LOTRIMIN) 1 % external cream Apply topically 2 times daily       clotrimazole-betamethasone (LOTRISONE) 1-0.05 % external cream Apply topically 2 times daily       doxazosin (CARDURA) 1 MG tablet Take  0.5 mg by mouth daily       ferrous sulfate (FEROSUL) 325 (65 Fe) MG tablet Take 325 mg by mouth daily (with breakfast)       furosemide (LASIX) 20 MG tablet Take 20 mg by mouth daily       metoprolol tartrate (LOPRESSOR) 50 MG tablet Take 50 mg by mouth 2 times daily       nitroGLYcerin (NITROSTAT) 0.4 MG sublingual tablet Place 0.4 mg under the tongue every 5 minutes as needed for chest pain For chest pain place 1 tablet under the tongue every 5 minutes for 3 doses. If symptoms persist 5 minutes after 1st dose call 911.       pantoprazole sodium (PROTONIX) 40 MG packet Take 1 packet by mouth daily       senna-docusate (SENOKOT-S/PERICOLACE) 8.6-50 MG tablet Take 2 tablets by mouth 2 times daily as needed for constipation       tamsulosin (FLOMAX) 0.4 MG capsule Take 0.4 mg by mouth daily       vitamin D3 (CHOLECALCIFEROL) 1000 units (25 mcg) tablet Take 2,000 Units by mouth daily         REVIEW OF SYSTEMS:  4 point ROS including Respiratory, CV, GI and , other than that noted in the HPI,  is negative    Objective:  Wt 91.6 kg (202 lb)   BMI 25.94 kg/m    Exam:  GENERAL APPEARANCE:  Alert, in no distress, cooperative  RESP:  respiratory effort and palpation of chest normal, lungs clear to auscultation , no respiratory distress  CV:  Palpation and auscultation of heart done , regular rate and rhythm, no murmur, rub, or gallop, no edema  SKIN:  pale, warm and dry.  healing skin conditions  PSYCH:  memory impaired , affect and mood normal, oriented to person and time which is vague    Labs:   Component      Latest Ref Rng & Units 3/15/2019   Sodium      133 - 144 mmol/L 145 (H)   Potassium      3.4 - 5.3 mmol/L 3.5   Chloride      94 - 109 mmol/L 109   Carbon Dioxide      20 - 32 mmol/L 28   Anion Gap      3 - 14 mmol/L 8   Glucose      70 - 99 mg/dL 81   Urea Nitrogen      7 - 30 mg/dL 24   Creatinine      0.66 - 1.25 mg/dL 1.05   GFR Estimate      >60 mL/min/1.73:m2 64   GFR Estimate If Black      >60  mL/min/1.73:m2 74   Calcium      8.5 - 10.1 mg/dL 8.3 (L)   WBC      4.0 - 11.0 10e9/L 8.4   RBC Count      4.4 - 5.9 10e12/L 2.87 (L)   Hemoglobin      13.3 - 17.7 g/dL 7.5 (L)   Hematocrit      40.0 - 53.0 % 24.1 (L)   MCV      78 - 100 fl 84   MCH      26.5 - 33.0 pg 26.1 (L)   MCHC      31.5 - 36.5 g/dL 31.1 (L)   RDW      10.0 - 15.0 % 19.7 (H)   Platelet Count      150 - 450 10e9/L 261   Cholesterol      <200 mg/dL 88   Triglycerides      <150 mg/dL 48   HDL Cholesterol      >39 mg/dL 46   LDL Cholesterol Calculated      <100 mg/dL 32   Non HDL Cholesterol      <130 mg/dL 42   TSH      0.40 - 4.00 mU/L 1.34       ASSESSMENT/PLAN:  1. Normocytic anemia - currently on FeSO4 325mg daily.  Feel this is his baseline.    Will continue with FeSO4 daily.  CBC again on Monday to make sure Diallo is staying stable.   2. Obstruction, uropathy - continue to monitor output and status of penis.  Sounds like it needs to be cleaned thoroughly.  Staff to keep NP updated.       3. Coronary atherosclerosis due to lipid rich plaque - no changes with medication.  Has had a endarterectomy       Orders written by provider at facility  1.  CBC on Monday due to anemia HgB = 7.5      Electronically signed by:  ROCÍO Pena CNP

## 2019-03-17 PROBLEM — L89.153 DECUBITUS ULCER OF COCCYX, STAGE III (H): Status: ACTIVE | Noted: 2019-03-17

## 2019-03-17 PROBLEM — B37.2 CANDIDIASIS OF SKIN: Status: ACTIVE | Noted: 2019-03-17

## 2019-03-20 ENCOUNTER — NURSING HOME VISIT (OUTPATIENT)
Dept: GERIATRICS | Facility: CLINIC | Age: 84
End: 2019-03-20
Payer: COMMERCIAL

## 2019-03-20 VITALS
HEART RATE: 71 BPM | SYSTOLIC BLOOD PRESSURE: 127 MMHG | DIASTOLIC BLOOD PRESSURE: 62 MMHG | RESPIRATION RATE: 16 BRPM | WEIGHT: 202 LBS | OXYGEN SATURATION: 97 % | BODY MASS INDEX: 25.93 KG/M2 | HEIGHT: 74 IN | TEMPERATURE: 96.8 F

## 2019-03-20 DIAGNOSIS — L89.153 DECUBITUS ULCER OF COCCYX, STAGE III (H): ICD-10-CM

## 2019-03-20 DIAGNOSIS — B37.2 CANDIDIASIS OF SKIN: Primary | ICD-10-CM

## 2019-03-20 PROCEDURE — 99309 SBSQ NF CARE MODERATE MDM 30: CPT | Performed by: NURSE PRACTITIONER

## 2019-03-20 RX ORDER — PANTOPRAZOLE SODIUM 40 MG/1
40 TABLET, DELAYED RELEASE ORAL DAILY
Status: ON HOLD | COMMUNITY
End: 2020-01-01

## 2019-03-20 ASSESSMENT — MIFFLIN-ST. JEOR: SCORE: 1666.02

## 2019-03-20 NOTE — PROGRESS NOTES
[facility]  [facility] Medical Record Number:  [id number]  Place of Service where encounter took place:  [facility] ([facility]) [[id number]]  Chief Complaint   Patient presents with     Nursing Home Acute       HPI:    Diallo Villarreal  is a 86 year old (8/31/1932), who is being seen today for an episodic care visit.  HPI information obtained from: facility staff and patient report. Today's concern is:    1. Candidiasis of skin    2. Decubitus ulcer of coccyx, stage III (H)      - nursing asked this NP if wanting to see Diallo's skin as he was being cared for by the aids this morning.  Staff state his skin is improved with the treatment prescribed.  Diallo was laying in bed.  Alert and cooperative.  Had no complaints about his skin.    Past Medical and Surgical History reviewed in Epic today.    MEDICATIONS:  Current Outpatient Medications   Medication Sig Dispense Refill     albuterol (PROVENTIL) (2.5 MG/3ML) 0.083% neb solution Take 2.5 mg by nebulization every 4 hours as needed for shortness of breath / dyspnea or wheezing       aspirin 81 MG EC tablet Take 81 mg by mouth daily       atorvastatin (LIPITOR) 40 MG tablet Take 40 mg by mouth daily       cinacalcet (SENSIPAR) 30 MG tablet Take 30 mg by mouth daily       clopidogrel (PLAVIX) 75 MG tablet Take 75 mg by mouth daily       clotrimazole (LOTRIMIN) 1 % external cream Apply topically 2 times daily       clotrimazole-betamethasone (LOTRISONE) 1-0.05 % external cream Apply topically 2 times daily       doxazosin (CARDURA) 1 MG tablet Take 0.5 mg by mouth daily       ferrous sulfate (FEROSUL) 325 (65 Fe) MG tablet Take 325 mg by mouth daily (with breakfast)       furosemide (LASIX) 20 MG tablet Take 20 mg by mouth daily       metoprolol tartrate (LOPRESSOR) 50 MG tablet Take 50 mg by mouth 2 times daily       nitroGLYcerin (NITROSTAT) 0.4 MG sublingual tablet Place 0.4 mg under the tongue every 5 minutes as needed for chest pain For  "chest pain place 1 tablet under the tongue every 5 minutes for 3 doses. If symptoms persist 5 minutes after 1st dose call 911.       pantoprazole (PROTONIX) 40 MG EC tablet Take 40 mg by mouth daily       tamsulosin (FLOMAX) 0.4 MG capsule Take 0.4 mg by mouth daily       vitamin D3 (CHOLECALCIFEROL) 1000 units (25 mcg) tablet Take 2,000 Units by mouth daily         REVIEW OF SYSTEMS:  4 point ROS including Respiratory, CV, GI and , other than that noted in the HPI,  is negative    Objective:  /62   Pulse 71   Temp 96.8  F (36  C)   Resp 16   Ht 1.88 m (6' 2\")   Wt 91.6 kg (202 lb)   SpO2 97%   BMI 25.94 kg/m    Exam:  GENERAL APPEARANCE:  Alert, in no distress, cooperative, staff assist in turning to his right side in order to look at his bottom  RESP:  respiratory effort and palpation of chest normal, lungs clear to auscultation , no respiratory distress  SKIN:  bottom open area is now reduced to a slit in which is covered with Mepilex dressing.  1cm length 0.5cm in width and 0.2mm in depth.  patch on right thigh is pale pink and not raised.  back of knees clear.    Labs:   not relivent    ASSESSMENT/PLAN:  1. Candidiasis of skin - improving with the use of  Clotrimazole betamethasone 1% 0.05% twice a day to belly button, back of left knee and right thigh for 14 days  6 more days of the cream.     2. Decubitus ulcer of coccyx, stage III (H) - will continue with the Mepilex dressing to the coccyx area which is changed every 3 days and prn.         No new orders    Electronically signed by:  ROCÍO Pena CNP         "

## 2019-03-20 NOTE — LETTER
3/20/2019        RE: Diallo Villarreal  [address]  [address]  [place] [address]        [facility]  [facility] Medical Record Number:  [id number]  Place of Service where encounter took place:  [facility] [[id number]]  Chief Complaint   Patient presents with     Nursing Home Acute       HPI:    Diallo Villarreal  is a 86 year old (8/31/1932), who is being seen today for an episodic care visit.  HPI information obtained from: facility staff and patient report. Today's concern is:    1. Candidiasis of skin    2. Decubitus ulcer of coccyx, stage III (H)      - nursing asked this NP if wanting to see Chriss skin as he was being cared for by the aids this morning.  Staff state his skin is improved with the treatment prescribed.  Diallo was laying in bed.  Alert and cooperative.  Had no complaints about his skin.    Past Medical and Surgical History reviewed in Epic today.    MEDICATIONS:  Current Outpatient Medications   Medication Sig Dispense Refill     albuterol (PROVENTIL) (2.5 MG/3ML) 0.083% neb solution Take 2.5 mg by nebulization every 4 hours as needed for shortness of breath / dyspnea or wheezing       aspirin 81 MG EC tablet Take 81 mg by mouth daily       atorvastatin (LIPITOR) 40 MG tablet Take 40 mg by mouth daily       cinacalcet (SENSIPAR) 30 MG tablet Take 30 mg by mouth daily       clopidogrel (PLAVIX) 75 MG tablet Take 75 mg by mouth daily       clotrimazole (LOTRIMIN) 1 % external cream Apply topically 2 times daily       clotrimazole-betamethasone (LOTRISONE) 1-0.05 % external cream Apply topically 2 times daily       doxazosin (CARDURA) 1 MG tablet Take 0.5 mg by mouth daily       ferrous sulfate (FEROSUL) 325 (65 Fe) MG tablet Take 325 mg by mouth daily (with breakfast)       furosemide (LASIX) 20 MG tablet Take 20 mg by mouth daily       metoprolol tartrate (LOPRESSOR) 50 MG tablet Take 50 mg by mouth 2 times daily       nitroGLYcerin (NITROSTAT) 0.4  "MG sublingual tablet Place 0.4 mg under the tongue every 5 minutes as needed for chest pain For chest pain place 1 tablet under the tongue every 5 minutes for 3 doses. If symptoms persist 5 minutes after 1st dose call 911.       pantoprazole (PROTONIX) 40 MG EC tablet Take 40 mg by mouth daily       tamsulosin (FLOMAX) 0.4 MG capsule Take 0.4 mg by mouth daily       vitamin D3 (CHOLECALCIFEROL) 1000 units (25 mcg) tablet Take 2,000 Units by mouth daily         REVIEW OF SYSTEMS:  4 point ROS including Respiratory, CV, GI and , other than that noted in the HPI,  is negative    Objective:  /62   Pulse 71   Temp 96.8  F (36  C)   Resp 16   Ht 1.88 m (6' 2\")   Wt 91.6 kg (202 lb)   SpO2 97%   BMI 25.94 kg/m     Exam:  GENERAL APPEARANCE:  Alert, in no distress, cooperative, staff assist in turning to his right side in order to look at his bottom  RESP:  respiratory effort and palpation of chest normal, lungs clear to auscultation , no respiratory distress  SKIN:  bottom open area is now reduced to a slit in which is covered with Mepilex dressing.  1cm length 0.5cm in width and 0.2mm in depth.  patch on right thigh is pale pink and not raised.  back of knees clear.    Labs:   not relivent    ASSESSMENT/PLAN:  1. Candidiasis of skin - improving with the use of  Clotrimazole betamethasone 1% 0.05% twice a day to belly button, back of left knee and right thigh for 14 days  6 more days of the cream.     2. Decubitus ulcer of coccyx, stage III (H) - will continue with the Mepilex dressing to the coccyx area which is changed every 3 days and prn.         No new orders    Electronically signed by:  ROCÍO Pena CNP             Sincerely,        ROCÍO Pena CNP    "

## 2019-03-21 ENCOUNTER — NURSING HOME VISIT (OUTPATIENT)
Dept: GERIATRICS | Facility: CLINIC | Age: 84
End: 2019-03-21
Payer: COMMERCIAL

## 2019-03-21 VITALS
HEIGHT: 74 IN | WEIGHT: 196.5 LBS | BODY MASS INDEX: 25.22 KG/M2 | HEART RATE: 68 BPM | SYSTOLIC BLOOD PRESSURE: 124 MMHG | DIASTOLIC BLOOD PRESSURE: 68 MMHG | RESPIRATION RATE: 16 BRPM | TEMPERATURE: 96.6 F | OXYGEN SATURATION: 96 %

## 2019-03-21 DIAGNOSIS — I25.83 CORONARY ATHEROSCLEROSIS DUE TO LIPID RICH PLAQUE: ICD-10-CM

## 2019-03-21 DIAGNOSIS — N13.9 OBSTRUCTION, UROPATHY: ICD-10-CM

## 2019-03-21 DIAGNOSIS — G80.2 SPASTIC HEMIPLEGIC CEREBRAL PALSY (H): Primary | ICD-10-CM

## 2019-03-21 DIAGNOSIS — Z97.8 CHRONIC INDWELLING FOLEY CATHETER: ICD-10-CM

## 2019-03-21 DIAGNOSIS — R53.81 PHYSICAL DECONDITIONING: ICD-10-CM

## 2019-03-21 DIAGNOSIS — S06.0X0D CONCUSSION WITHOUT LOSS OF CONSCIOUSNESS, SUBSEQUENT ENCOUNTER: ICD-10-CM

## 2019-03-21 DIAGNOSIS — L89.153 DECUBITUS ULCER OF COCCYX, STAGE III (H): ICD-10-CM

## 2019-03-21 PROCEDURE — 99305 1ST NF CARE MODERATE MDM 35: CPT | Performed by: FAMILY MEDICINE

## 2019-03-21 SDOH — HEALTH STABILITY: MENTAL HEALTH: HOW OFTEN DO YOU HAVE A DRINK CONTAINING ALCOHOL?: NEVER

## 2019-03-21 ASSESSMENT — MIFFLIN-ST. JEOR: SCORE: 1641.07

## 2019-03-21 NOTE — LETTER
3/21/2019        RE: Diallo Villarreal  [address]  [address]  [place] [address]        [facility]  PRIMARY CARE PROVIDER AND CLINIC:  [facility], [address] / [place] [address]  Chief Complaint   Patient presents with     Hospital F/U     Clinic Care Coordination - Initial     [facility] Medical Record Number:  [id number]  Place of Service where encounter took place:  [facility] ([facility]) [[id number]]    Diallo Villarreal  is a 86 year old  (8/31/1932), admitted to the above facility from  [facility] . Hospital stay 3/2/19 through 3/8/19..  Admitted to this facility for  rehab, medical management and nursing care.    HPI:    HPI information obtained from: facility staff, patient report, [facility] chart review and family/first contact two daughters and a son at the bedside report.   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 Daughter at the bedside reports her father is making a progress.   - Regarding the Armendariz's catheter, daughter reports that was her father will need a permanent catheter but not before July when he will be done with blood thinner which is given for the stent in his heart. Denies dysuria, suprapubic pain, blood in urine. Denies chest pain or SOB.     ==============================================================  CODE STATUS/ADVANCE DIRECTIVES DISCUSSION:   DNR / DNI  Patient's living condition: lives in an adult foster home  ALLERGIES: Gluten meal and Wheat  extract  PAST MEDICAL HISTORY:  has a past medical history of AAA (abdominal aortic aneurysm) (H), Celiac sprue, and HTN (hypertension).  PAST SURGICAL HISTORY:   has a past surgical history that includes tonsillectomy and LEFT CAROTID ENDARTERECTOMY.  FAMILY HISTORY: family history is not on file.  SOCIAL HISTORY:   reports that he has quit smoking. he has never used smokeless tobacco. He reports that he does not drink alcohol.    Post Discharge Medication Reconciliation Status: discharge medications reconciled and changed, per note/orders (see AVS)  Current Outpatient Medications   Medication Sig Dispense Refill     albuterol (PROVENTIL) (2.5 MG/3ML) 0.083% neb solution Take 2.5 mg by nebulization every 4 hours as needed for shortness of breath / dyspnea or wheezing       aspirin 81 MG EC tablet Take 81 mg by mouth daily       atorvastatin (LIPITOR) 40 MG tablet Take 40 mg by mouth daily       cinacalcet (SENSIPAR) 30 MG tablet Take 30 mg by mouth daily       clopidogrel (PLAVIX) 75 MG tablet Take 75 mg by mouth daily       clotrimazole (LOTRIMIN) 1 % external cream Apply topically 2 times daily       clotrimazole-betamethasone (LOTRISONE) 1-0.05 % external cream Apply topically 2 times daily       doxazosin (CARDURA) 1 MG tablet Take 0.5 mg by mouth daily       ferrous sulfate (FEROSUL) 325 (65 Fe) MG tablet Take 325 mg by mouth daily (with breakfast)       furosemide (LASIX) 20 MG tablet Take 20 mg by mouth daily       metoprolol tartrate (LOPRESSOR) 50 MG tablet Take 50 mg by mouth 2 times daily       nitroGLYcerin (NITROSTAT) 0.4 MG sublingual tablet Place 0.4 mg under the tongue every 5 minutes as needed for chest pain For chest pain place 1 tablet under the tongue every 5 minutes for 3 doses. If symptoms persist 5 minutes after 1st dose call 911.       pantoprazole (PROTONIX) 40 MG EC tablet Take 40 mg by mouth daily       tamsulosin (FLOMAX) 0.4 MG capsule Take 0.4 mg by mouth daily       vitamin D3  "(CHOLECALCIFEROL) 1000 units (25 mcg) tablet Take 2,000 Units by mouth daily       ROS: 10 point ROS of systems including Constitutional, Eyes, Respiratory, Cardiovascular, Gastroenterology, Genitourinary, Integumentary, Musculoskeletal, Psychiatric were all negative except for pertinent positives noted in my HPI.    Vitals:  /68   Pulse 68   Temp 96.6  F (35.9  C)   Resp 16   Ht 1.88 m (6' 2\")   Wt 89.1 kg (196 lb 8 oz)   SpO2 96%   BMI 25.23 kg/m     Exam:  GENERAL APPEARANCE:  in no distress, cooperative  ENT:  Mouth and posterior oropharynx normal, moist mucous membranes, oral mucosa moist, no lesion noted.   EYES:  EOMI, Pupil rounded and equal.  RESP:  lungs clear to auscultation   CV:  S1S2 audible, irregular HR, no murmur appreciated.   ABDOMEN:  soft, NT/ND, BS audible. no mass appreciated on palpation.   M/S:   Kyphosis,   SKIN:  No rash.   NEURO:   Strength 4/5 right lower extremity and left upper extremity, 5/5 on the left side  PSYCH:  Fair insight, judgement and memory, affect and mood normal    Lab/Diagnostic data: recent data  in the epic reviewed.     ASSESSMENT/PLAN:  Spastic hemiplegic cerebral palsy (H)  Concussion without loss of consciousness, subsequent encounter  Physical Deconditioning:  - chronic right sided weakness, started rehab program, making a progress, continue until desired goal is achieved.       Pressure Injury Stage 3:  - continue current wound care and supporting measures.     Coronary atherosclerosis due to lipid rich plaque  S/P carotid endarterectomy  - at baseline.     Obstruction, uropathy  Chronic Indwelling Catheter:  - on Cardura and flomax.   - catheter care. If continued to have trouble with the catheter, consider Urology consult.   - will need SPC after June once off Plavix as advised to specialist.       Order:  - See above, otherwise, continue the rest of the current POC.     Electronically signed by:  Ramírez Ibrahim MD "                       Sincerely,        Ramírez Ibrahim MD

## 2019-03-21 NOTE — PROGRESS NOTES
[place] [facility]  PRIMARY CARE PROVIDER AND CLINIC:  Millicent Fonseca [facility], [address] / [place] [address]  Chief Complaint   Patient presents with     Hospital F/U     Clinic Care Coordination - Initial     [place] Medical Record Number:  [id number]  Place of Service where encounter took place:  [facility] ([facility] [[id number]]    Diallo Villarreal  is a 86 year old  (8/31/1932), admitted to the above facility from  [facility] . Hospital stay 3/2/19 through 3/8/19..  Admitted to this facility for  rehab, medical management and nursing care.    HPI:    HPI information obtained from: facility staff, patient report, [facility] chart review and family/first contact two daughters and a son at the bedside report.   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 Daughter at the bedside reports her father is making a progress.   - Regarding the Armendariz's catheter, daughter reports that was her father will need a permanent catheter but not before July when he will be done with blood thinner which is given for the stent in his heart. Denies dysuria, suprapubic pain, blood in urine. Denies chest pain or SOB.     ==============================================================  CODE STATUS/ADVANCE DIRECTIVES DISCUSSION:   DNR / DNI  Patient's living condition: lives in an adult foster home  ALLERGIES: Gluten meal and Wheat extract  PAST MEDICAL HISTORY:  has a past medical history of AAA (abdominal aortic aneurysm) (H),  Celiac sprue, and HTN (hypertension).  PAST SURGICAL HISTORY:   has a past surgical history that includes tonsillectomy and LEFT CAROTID ENDARTERECTOMY.  FAMILY HISTORY: family history is not on file.  SOCIAL HISTORY:   reports that he has quit smoking. he has never used smokeless tobacco. He reports that he does not drink alcohol.    Post Discharge Medication Reconciliation Status: discharge medications reconciled and changed, per note/orders (see AVS)  Current Outpatient Medications   Medication Sig Dispense Refill     albuterol (PROVENTIL) (2.5 MG/3ML) 0.083% neb solution Take 2.5 mg by nebulization every 4 hours as needed for shortness of breath / dyspnea or wheezing       aspirin 81 MG EC tablet Take 81 mg by mouth daily       atorvastatin (LIPITOR) 40 MG tablet Take 40 mg by mouth daily       cinacalcet (SENSIPAR) 30 MG tablet Take 30 mg by mouth daily       clopidogrel (PLAVIX) 75 MG tablet Take 75 mg by mouth daily       clotrimazole (LOTRIMIN) 1 % external cream Apply topically 2 times daily       clotrimazole-betamethasone (LOTRISONE) 1-0.05 % external cream Apply topically 2 times daily       doxazosin (CARDURA) 1 MG tablet Take 0.5 mg by mouth daily       ferrous sulfate (FEROSUL) 325 (65 Fe) MG tablet Take 325 mg by mouth daily (with breakfast)       furosemide (LASIX) 20 MG tablet Take 20 mg by mouth daily       metoprolol tartrate (LOPRESSOR) 50 MG tablet Take 50 mg by mouth 2 times daily       nitroGLYcerin (NITROSTAT) 0.4 MG sublingual tablet Place 0.4 mg under the tongue every 5 minutes as needed for chest pain For chest pain place 1 tablet under the tongue every 5 minutes for 3 doses. If symptoms persist 5 minutes after 1st dose call 911.       pantoprazole (PROTONIX) 40 MG EC tablet Take 40 mg by mouth daily       tamsulosin (FLOMAX) 0.4 MG capsule Take 0.4 mg by mouth daily       vitamin D3 (CHOLECALCIFEROL) 1000 units (25 mcg) tablet Take 2,000 Units by mouth daily       ROS: 10 point ROS of  "systems including Constitutional, Eyes, Respiratory, Cardiovascular, Gastroenterology, Genitourinary, Integumentary, Musculoskeletal, Psychiatric were all negative except for pertinent positives noted in my HPI.    Vitals:  /68   Pulse 68   Temp 96.6  F (35.9  C)   Resp 16   Ht 1.88 m (6' 2\")   Wt 89.1 kg (196 lb 8 oz)   SpO2 96%   BMI 25.23 kg/m    Exam:  GENERAL APPEARANCE:  in no distress, cooperative  ENT:  Mouth and posterior oropharynx normal, moist mucous membranes, oral mucosa moist, no lesion noted.   EYES:  EOMI, Pupil rounded and equal.  RESP:  lungs clear to auscultation   CV:  S1S2 audible, irregular HR, no murmur appreciated.   ABDOMEN:  soft, NT/ND, BS audible. no mass appreciated on palpation.   M/S:   Kyphosis,   SKIN:  No rash.   NEURO:   Strength 4/5 right lower extremity and left upper extremity, 5/5 on the left side  PSYCH:  Fair insight, judgement and memory, affect and mood normal    Lab/Diagnostic data: recent data  in the epic reviewed.     ASSESSMENT/PLAN:  Spastic hemiplegic cerebral palsy (H)  Concussion without loss of consciousness, subsequent encounter  Physical Deconditioning:  - chronic right sided weakness, started rehab program, making a progress, continue until desired goal is achieved.       Pressure Injury Stage 3:  - continue current wound care and supporting measures.     Coronary atherosclerosis due to lipid rich plaque  S/P carotid endarterectomy  - at baseline.     Obstruction, uropathy  Chronic Indwelling Catheter:  - on Cardura and flomax.   - catheter care. If continued to have trouble with the catheter, consider Urology consult.   - will need SPC after June once off Plavix as advised to specialist.       Order:  - See above, otherwise, continue the rest of the current POC.     Electronically signed by:  Ramírez Ibrahim MD                   "

## 2019-04-02 ENCOUNTER — NURSING HOME VISIT (OUTPATIENT)
Dept: GERIATRICS | Facility: CLINIC | Age: 84
End: 2019-04-02
Payer: COMMERCIAL

## 2019-04-02 VITALS
OXYGEN SATURATION: 97 % | DIASTOLIC BLOOD PRESSURE: 74 MMHG | BODY MASS INDEX: 22.84 KG/M2 | TEMPERATURE: 97.3 F | SYSTOLIC BLOOD PRESSURE: 137 MMHG | WEIGHT: 178 LBS | RESPIRATION RATE: 16 BRPM | HEART RATE: 81 BPM | HEIGHT: 74 IN

## 2019-04-02 DIAGNOSIS — J44.9 CHRONIC OBSTRUCTIVE PULMONARY DISEASE, UNSPECIFIED COPD TYPE (H): ICD-10-CM

## 2019-04-02 DIAGNOSIS — S06.0X0D CONCUSSION WITHOUT LOSS OF CONSCIOUSNESS, SUBSEQUENT ENCOUNTER: ICD-10-CM

## 2019-04-02 DIAGNOSIS — I71.40 ABDOMINAL AORTIC ANEURYSM (AAA) WITHOUT RUPTURE (H): ICD-10-CM

## 2019-04-02 DIAGNOSIS — I25.83 CORONARY ATHEROSCLEROSIS DUE TO LIPID RICH PLAQUE: ICD-10-CM

## 2019-04-02 DIAGNOSIS — D64.9 NORMOCYTIC ANEMIA: ICD-10-CM

## 2019-04-02 DIAGNOSIS — G80.2 SPASTIC HEMIPLEGIC CEREBRAL PALSY (H): Primary | ICD-10-CM

## 2019-04-02 DIAGNOSIS — Z98.890 S/P CAROTID ENDARTERECTOMY: ICD-10-CM

## 2019-04-02 DIAGNOSIS — R53.81 PHYSICAL DECONDITIONING: ICD-10-CM

## 2019-04-02 DIAGNOSIS — I10 ESSENTIAL HYPERTENSION: ICD-10-CM

## 2019-04-02 DIAGNOSIS — Z97.8 CHRONIC INDWELLING FOLEY CATHETER: ICD-10-CM

## 2019-04-02 DIAGNOSIS — N13.9 OBSTRUCTION, UROPATHY: ICD-10-CM

## 2019-04-02 PROCEDURE — 99310 SBSQ NF CARE HIGH MDM 45: CPT | Performed by: NURSE PRACTITIONER

## 2019-04-02 RX ORDER — ZINC OXIDE 216 MG/ML
8 LOTION TOPICAL 2 TIMES DAILY
COMMUNITY
End: 2019-08-09

## 2019-04-02 ASSESSMENT — MIFFLIN-ST. JEOR: SCORE: 1557.15

## 2019-04-02 NOTE — PROGRESS NOTES
[place] [facility]  [place] Medical Record Number:  [id number]  Place of Service where encounter took place:  [facility] ([facility] [[id number]]  Chief Complaint   Patient presents with     [facility]       HPI:    Diallo Villarreal  is a 86 year old (8/31/1932), who is being seen today for an episodic care visit.  HPI information obtained from: facility chart records, facility staff, patient report, [facility] chart review and Care Everywhere [facility] chart review. Today's concern is:     Spastic hemiplegic cerebral palsy (H)  Concussion without loss of consciousness, subsequent encounter  Abdominal aortic aneurysm (AAA) without rupture (H)  Chronic obstructive pulmonary disease, unspecified COPD type (H)  Obstruction, uropathy  Chronic indwelling Armendariz catheter  Essential hypertension  Coronary atherosclerosis due to lipid rich plaque  S/P carotid endarterectomy  Physical deconditioning  Normocytic anemia     Patient is an 86 year old gentleman with PMH of CP, falls, celiac disease, HTN, urinary retention with presence of chronically indwelling catheter, CAD s/p PCI (2018), AAA without rupture who presented to [facility] from his group home where he sustained a fall with head injury/concussion (no LOC).  Head CT was neg for acute fracture or bleed.  Hospitalization was c/b hypovolemia treated with IVF and holding of PTA diuretics and SOB/Wheezing, treated with aggressive pulmonary toileting, resumption of PTA diuretics and DuoNebs.  Patient was then transferred to TCU for strengthening, and now has admitted to LTC for continued therapy, nursing cares and medical management.      Patient is met today in his SNF LTC room where he reports no pain, SON, CP, HA, lightheadedness, heartburn, upset stomach, constipation, or issues/discomfort with his catheter.       Past Medical and Surgical History reviewed in Epic today.    MEDICATIONS:  Current Outpatient Medications   Medication Sig  "Dispense Refill     albuterol (PROVENTIL) (2.5 MG/3ML) 0.083% neb solution Take 2.5 mg by nebulization every 4 hours as needed for shortness of breath / dyspnea or wheezing       aspirin 81 MG EC tablet Take by mouth daily = 120 cc       atorvastatin (LIPITOR) 40 MG tablet Take 40 mg by mouth daily       cinacalcet (SENSIPAR) 30 MG tablet Take 30 mg by mouth daily       clopidogrel (PLAVIX) 75 MG tablet Take 75 mg by mouth daily       clotrimazole (LOTRIMIN) 1 % external cream Apply topically 2 times daily       doxazosin (CARDURA) 1 MG tablet Take 0.5 mg by mouth daily       ferrous sulfate (FEROSUL) 325 (65 Fe) MG tablet Take 325 mg by mouth daily (with breakfast)       furosemide (LASIX) 20 MG tablet Take 20 mg by mouth daily       metoprolol tartrate (LOPRESSOR) 50 MG tablet Take 50 mg by mouth 2 times daily       nitroGLYcerin (NITROSTAT) 0.4 MG sublingual tablet Place 0.4 mg under the tongue every 5 minutes as needed for chest pain For chest pain place 1 tablet under the tongue every 5 minutes for 3 doses. If symptoms persist 5 minutes after 1st dose call 911.       NUTRITIONAL SUPPLEMENT LIQD Take by mouth 2 times daily       pantoprazole (PROTONIX) 40 MG EC tablet Take 40 mg by mouth daily       tamsulosin (FLOMAX) 0.4 MG capsule Take 0.4 mg by mouth daily       vitamin D3 (CHOLECALCIFEROL) 1000 units (25 mcg) tablet Take 2,000 Units by mouth daily       REVIEW OF SYSTEMS:  Limited secondary to cognitive impairment but today pt reports 10 point ROS of systems including Constitutional, Eyes, Respiratory, Cardiovascular, Gastroenterology, Genitourinary, Integumentary, Musculoskeletal, Psychiatric were all negative except for pertinent positives noted in my HPI.    Objective:  /74   Pulse 81   Temp 97.3  F (36.3  C)   Resp 16   Ht 1.88 m (6' 2\")   Wt 80.7 kg (178 lb)   SpO2 97%   BMI 22.85 kg/m    Exam:  GENERAL APPEARANCE:  Alert, in no distress, pleasant   RESP:  respiratory effort and palpation " of chest normal, auscultation of lungs clear, no respiratory distress  CV:  Palpation and auscultation of heart done , rate and rhythm regular, no murmur, no LE peripheral edema  ABDOMEN:  normal bowel sounds, soft, nontender, no hepatosplenomegaly or other masses  : indwelling catheter in place - just emptied so no urine for evaluation  M/S:   Gait and station with W/C for mobility, Digits and nails with arthritic changes, reduced muscle mass  SKIN:  Inspection and Palpation of skin and subcutaneous tissue pale, seemingly intact from limited, clothed assessment  PSYCH:  insight and judgement, memory with impairment, affect and mood normal, follows commands readily         Labs:   Labs done in SNF are in [place] EPIC. Please refer to them using [employer]/Care Everywhere.    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  Making small progress with therapy; unknown if goal to return back to group home if progressed enough with therapy.     No spasticity witnessed today.    PLAN:  - nursing for supportive cares  - monitor for progress and ability to return to group home setting    Concussion without loss of consciousness, subsequent encounter  Post fall had dizziness and inability to ambulate  Head CT neg for acute fracture or bleed    A&O x 2 today (self, date - not to location).   Patient denies lightheadedness     PLAN:  - seemingly resolved, monitor for symptoms     Abdominal aortic aneurysm (AAA) without rupture (H)  F/b: Dr Reyes Enrique, Vascular Surgery, [facility]     3/2/19 CT Abdomen/Pel noting 6.7 cm AAA  Cardiology notes indicate need for open repair of AAA so patient was then sent to Cardiology for surgery clearance.  Stress test was abnormal and was f/b angiogram with stenting in mid LAD followed by staged procedure of RCA at a later  date. Ultimately patient elected not to have open repair and follow with surveillance U/S.     Patient today denies CP, lightheadedness, SOB.  He has no LE edema or SOB on exam    Is on Plavix 75 mg daily x 1 year (until at least 7/2019), ASA 81 mg daily indefinitely    PLAN:  - f/u with [facility] re: surveillance U/S for monitoring.   - monitor for symptoms of CP, SOB, palpitations, lightheadedness, orthopnea, edema.     Presumed COPD (H)  40 year history of smoking  While in-patient patient was wheezy and hypoxic - treated with aggressive pulmonary toileting, resumption of diuretics and DuoNebs    On Albuterol Nebs PRN  Patient denies SOB  LS clear  Sats 91-97% on RA    PLAN:  - continue Albuterol Nebs PRN  - follow clinically     Obstruction, uropathy  Chronic indwelling Mock catheter  CT Abdomen/Pel noting 6 mm calculus in left renal collecting system with additional nonobstructing intrarenal calculi bilaterally. Bladder wall thickening likely d/t underdistention    On doxazosin 0.5 mg daily, tamsulosin 0.4 mg daily  Patient denies any issues or concerns with indwelling catheter  No urine in bag today for monitoring (just emptied)    PLAN:  - continue doxazosin and tamsulosin  - routine mock cares  - Plan is for possible suprapubic, but not while on Plavix (which is at least until 7/2019)    Essential hypertension  Coronary atherosclerosis due to lipid rich plaque  S/P carotid endarterectomy  F/b [facility], Dr Satinder Bowman  Per Care Everywhere notes:  1. Coronary artery disease found on abnormal stress test.    9/7/2018 status post atherectomy angioplasty and stenting of the mid LAD.    9/17/2018 angioplasty and stenting of the proximal, mid and distal right coronary artery.   2. AAA measuring 6.4 cm, not amenable to endovascular therapy, follows with Dr. Reyes Enrique, Vascular Surgery.  3. Carotid artery disease status post left CEA in 2002.  4. Hypertension.  5.  Hyperlipidemia.     See above re: AAA POC    Recent Labs   Lab Test 03/15/19  0719   CHOL 88   HDL 46   LDL 32   TRIG 48       Patient is on furosemide 20 mg daily (no KCL supplement. Last K 3.5)  Metoprolol 50 mg BID, ASA 81 mg daily, atorvastatin 40 mg daily, Plavix 75 mg daily, nitroglycerin PRN.   (also on doxazosin 0.5 mg daily)    BPs variable: mostly 100-130s/50-70s  HRs 64-81  Patient denies CP, HA, lightheadedness     PLAN:  - Plavix 75 mg daily x 1 year (at least until 7/2019)  - ASA 81 mg daily indefinitely  - continue Lasix 20 mg daily and monitor electrolytes (mann K as borderline)  - continue metoprolol, statin   - BP goals are  <140/90 mm Hg.This is higher than ACC and AHA recommendations due to goals of care, risk for hypotension, risk of dizziness and falls and risk of tissue/cerebral hypoperfusion. Patient is stable with current plan of care and routine assessment.  - f/u with Cardiology as planned      Physical deconditioning  History of CP, falls now with new concussion and TCU stay  Continue Physical Therapy, Occupational Therapy as patient is making slow progress   On Boost BID and house supplement BID    Normocytic anemia  Hemoglobin   Date Value Ref Range Status   03/15/2019 7.5 (L) 13.3 - 17.7 g/dL Final     On Fe Sulfate 325 mg daily.    PLAN:  - recheck Hgb for stability and titration needs       Orders written by provider at facility  1. BMP, Hgb on Wed, 4/3/19 Dx: anemia, HTN      Total time spent with patient visit at the skilled nursing facility was >35 min including patient visit, review of past records and coordinationof care with nursing. Greater than 50% of total time spent with counseling and coordinating care due to review of records, patient visit, coordination of care.   Electronically signed by:  ROCÍO Larsen CNP

## 2019-04-02 NOTE — LETTER
4/2/2019        RE: Diallo Villarreal  [address]  [address]  [place] [address]        [facility]  [place] [facility] Record Number:  [id number]  Place of Service where encounter took place:  [facility] [[id number]]  Chief Complaint   Patient presents with     [facility]       HPI:    Diallo Villarreal  is a 86 year old (8/31/1932), who is being seen today for an episodic care visit.  HPI information obtained from: facility chart records, facility staff, patient report, [facility] chart review and Care Everywhere [facility] chart review. Today's concern is:     Spastic hemiplegic cerebral palsy (H)  Concussion without loss of consciousness, subsequent encounter  Abdominal aortic aneurysm (AAA) without rupture (H)  Chronic obstructive pulmonary disease, unspecified COPD type (H)  Obstruction, uropathy  Chronic indwelling Armendariz catheter  Essential hypertension  Coronary atherosclerosis due to lipid rich plaque  S/P carotid endarterectomy  Physical deconditioning  Normocytic anemia     Patient is an 86 year old gentleman with PMH of CP, falls, celiac disease, HTN, urinary retention with presence of chronically indwelling catheter, CAD s/p PCI (2018), AAA without rupture who presented to [facility] from his group home where he sustained a fall with head injury/concussion (no LOC).  Head CT was neg for acute fracture or bleed.  Hospitalization was c/b hypovolemia treated with IVF and holding of PTA diuretics and SOB/Wheezing, treated with aggressive pulmonary toileting, resumption of PTA diuretics and DuoNebs.  Patient was then transferred to TCU for strengthening, and now has admitted to LTC for continued therapy, nursing cares and medical management.      Patient is met today in his SNF LTC room where he reports no pain, SON, CP, HA, lightheadedness, heartburn, upset stomach, constipation, or issues/discomfort with his catheter.       Past Medical and Surgical  "History reviewed in Epic today.    MEDICATIONS:  Current Outpatient Medications   Medication Sig Dispense Refill     albuterol (PROVENTIL) (2.5 MG/3ML) 0.083% neb solution Take 2.5 mg by nebulization every 4 hours as needed for shortness of breath / dyspnea or wheezing       aspirin 81 MG EC tablet Take by mouth daily = 120 cc       atorvastatin (LIPITOR) 40 MG tablet Take 40 mg by mouth daily       cinacalcet (SENSIPAR) 30 MG tablet Take 30 mg by mouth daily       clopidogrel (PLAVIX) 75 MG tablet Take 75 mg by mouth daily       clotrimazole (LOTRIMIN) 1 % external cream Apply topically 2 times daily       doxazosin (CARDURA) 1 MG tablet Take 0.5 mg by mouth daily       ferrous sulfate (FEROSUL) 325 (65 Fe) MG tablet Take 325 mg by mouth daily (with breakfast)       furosemide (LASIX) 20 MG tablet Take 20 mg by mouth daily       metoprolol tartrate (LOPRESSOR) 50 MG tablet Take 50 mg by mouth 2 times daily       nitroGLYcerin (NITROSTAT) 0.4 MG sublingual tablet Place 0.4 mg under the tongue every 5 minutes as needed for chest pain For chest pain place 1 tablet under the tongue every 5 minutes for 3 doses. If symptoms persist 5 minutes after 1st dose call 911.       NUTRITIONAL SUPPLEMENT LIQD Take by mouth 2 times daily       pantoprazole (PROTONIX) 40 MG EC tablet Take 40 mg by mouth daily       tamsulosin (FLOMAX) 0.4 MG capsule Take 0.4 mg by mouth daily       vitamin D3 (CHOLECALCIFEROL) 1000 units (25 mcg) tablet Take 2,000 Units by mouth daily       REVIEW OF SYSTEMS:  Limited secondary to cognitive impairment but today pt reports 10 point ROS of systems including Constitutional, Eyes, Respiratory, Cardiovascular, Gastroenterology, Genitourinary, Integumentary, Musculoskeletal, Psychiatric were all negative except for pertinent positives noted in my HPI.    Objective:  /74   Pulse 81   Temp 97.3  F (36.3  C)   Resp 16   Ht 1.88 m (6' 2\")   Wt 80.7 kg (178 lb)   SpO2 97%   BMI 22.85 kg/m   "   Exam:  GENERAL APPEARANCE:  Alert, in no distress, pleasant   RESP:  respiratory effort and palpation of chest normal, auscultation of lungs clear, no respiratory distress  CV:  Palpation and auscultation of heart done , rate and rhythm regular, no murmur, no LE peripheral edema  ABDOMEN:  normal bowel sounds, soft, nontender, no hepatosplenomegaly or other masses  : indwelling catheter in place - just emptied so no urine for evaluation  M/S:   Gait and station with W/C for mobility, Digits and nails with arthritic changes, reduced muscle mass  SKIN:  Inspection and Palpation of skin and subcutaneous tissue pale, seemingly intact from limited, clothed assessment  PSYCH:  insight and judgement, memory with impairment, affect and mood normal, follows commands readily         Labs:   Labs done in SNF are in [place] EPIC. Please refer to them using [employer]18/Care Everywhere.    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  Making small progress with therapy; unknown if goal to return back to group home if progressed enough with therapy.     No spasticity witnessed today.    PLAN:  - nursing for supportive cares  - monitor for progress and ability to return to group home setting    Concussion without loss of consciousness, subsequent encounter  Post fall had dizziness and inability to ambulate  Head CT neg for acute fracture or bleed    A&O x 2 today (self, date - not to location).   Patient denies lightheadedness     PLAN:  - seemingly resolved, monitor for symptoms     Abdominal aortic aneurysm (AAA) without rupture (H)  F/b: Dr Reyes Enrique, Vascular Surgery, [facility]     3/2/19 CT Abdomen/Pel noting 6.7 cm AAA  Cardiology notes indicate need for open repair of AAA so patient was then sent to Cardiology for surgery clearance.  Stress test was  abnormal and was f/b angiogram with stenting in mid LAD followed by staged procedure of RCA at a later date. Ultimately patient elected not to have open repair and follow with surveillance U/S.     Patient today denies CP, lightheadedness, SOB.  He has no LE edema or SOB on exam    Is on Plavix 75 mg daily x 1 year (until at least 7/2019), ASA 81 mg daily indefinitely    PLAN:  - f/u with [facility] re: surveillance U/S for monitoring.   - monitor for symptoms of CP, SOB, palpitations, lightheadedness, orthopnea, edema.     Presumed COPD (H)  40 year history of smoking  While in-patient patient was wheezy and hypoxic - treated with aggressive pulmonary toileting, resumption of diuretics and DuoNebs    On Albuterol Nebs PRN  Patient denies SOB  LS clear  Sats 91-97% on RA    PLAN:  - continue Albuterol Nebs PRN  - follow clinically     Obstruction, uropathy  Chronic indwelling Mock catheter  CT Abdomen/Pel noting 6 mm calculus in left renal collecting system with additional nonobstructing intrarenal calculi bilaterally. Bladder wall thickening likely d/t underdistention    On doxazosin 0.5 mg daily, tamsulosin 0.4 mg daily  Patient denies any issues or concerns with indwelling catheter  No urine in bag today for monitoring (just emptied)    PLAN:  - continue doxazosin and tamsulosin  - routine mock cares  - Plan is for possible suprapubic, but not while on Plavix (which is at least until 7/2019)    Essential hypertension  Coronary atherosclerosis due to lipid rich plaque  S/P carotid endarterectomy  F/b [facility] - [facility], Dr Satinder Bowman  Per Care Everywhere notes:  1. Coronary artery disease found on abnormal stress test.    9/7/2018 status post atherectomy angioplasty and stenting of the mid LAD.    9/17/2018 angioplasty and stenting of the proximal, mid and distal right coronary artery.   2. AAA measuring 6.4 cm, not amenable to endovascular therapy, follows with Dr. Chambers  Klaus, Vascular Surgery.  3. Carotid artery disease status post left CEA in 2002.  4. Hypertension.  5. Hyperlipidemia.     See above re: AAA POC    Recent Labs   Lab Test 03/15/19  0719   CHOL 88   HDL 46   LDL 32   TRIG 48       Patient is on furosemide 20 mg daily (no KCL supplement. Last K 3.5)  Metoprolol 50 mg BID, ASA 81 mg daily, atorvastatin 40 mg daily, Plavix 75 mg daily, nitroglycerin PRN.   (also on doxazosin 0.5 mg daily)    BPs variable: mostly 100-130s/50-70s  HRs 64-81  Patient denies CP, HA, lightheadedness     PLAN:  - Plavix 75 mg daily x 1 year (at least until 7/2019)  - ASA 81 mg daily indefinitely  - continue Lasix 20 mg daily and monitor electrolytes (mann K as borderline)  - continue metoprolol, statin   - BP goals are  <140/90 mm Hg.This is higher than ACC and AHA recommendations due to goals of care, risk for hypotension, risk of dizziness and falls and risk of tissue/cerebral hypoperfusion. Patient is stable with current plan of care and routine assessment.  - f/u with Cardiology as planned      Physical deconditioning  History of CP, falls now with new concussion and TCU stay  Continue Physical Therapy, Occupational Therapy as patient is making slow progress   On Boost BID and house supplement BID    Normocytic anemia  Hemoglobin   Date Value Ref Range Status   03/15/2019 7.5 (L) 13.3 - 17.7 g/dL Final     On Fe Sulfate 325 mg daily.    PLAN:  - recheck Hgb for stability and titration needs       Orders written by provider at facility  1. BMP, Hgb on Wed, 4/3/19 Dx: anemia, HTN      Total time spent with patient visit at the skilled nursing facility was >35 min including patient visit, review of past records and coordinationof care with nursing. Greater than 50% of total time spent with counseling and coordinating care due to review of records, patient visit, coordination of care.   Electronically signed by:  ROCÍO Larsen CNP             Sincerely,        Jennifer Bryant,  APRN CNP

## 2019-04-18 ENCOUNTER — NURSING HOME VISIT (OUTPATIENT)
Dept: GERIATRICS | Facility: CLINIC | Age: 84
End: 2019-04-18
Payer: COMMERCIAL

## 2019-04-18 DIAGNOSIS — Z98.890 S/P CAROTID ENDARTERECTOMY: ICD-10-CM

## 2019-04-18 DIAGNOSIS — G80.2 SPASTIC HEMIPLEGIC CEREBRAL PALSY (H): Primary | ICD-10-CM

## 2019-04-18 DIAGNOSIS — Z87.820 HISTORY OF CONCUSSION: ICD-10-CM

## 2019-04-18 DIAGNOSIS — I71.40 ABDOMINAL AORTIC ANEURYSM (AAA) WITHOUT RUPTURE (H): ICD-10-CM

## 2019-04-18 DIAGNOSIS — Z97.8 CHRONIC INDWELLING FOLEY CATHETER: ICD-10-CM

## 2019-04-18 DIAGNOSIS — R54 FRAILTY: ICD-10-CM

## 2019-04-18 DIAGNOSIS — I25.83 CORONARY ATHEROSCLEROSIS DUE TO LIPID RICH PLAQUE: ICD-10-CM

## 2019-04-18 PROCEDURE — 99309 SBSQ NF CARE MODERATE MDM 30: CPT | Performed by: FAMILY MEDICINE

## 2019-04-18 NOTE — LETTER
4/18/2019        RE: Diallo Villarreal  [address]  [address]  [place] [address]        [facility]    [place] Medical Record Number:  [id number]  Place of Service where encounter took place:  No question data found.    HPI:    Diallo Villarreal is a 86 year old  (8/31/1932), who is being seen today for a federally mandated E/M visit.  HPI information obtained from: facility staff, patient report and [facility] chart review    Today's concerns are:  - GNP reports Resident completed rehab program in the TCU, and due to continued need for care, admitted to LTC.   -  - Resident seen and examined.   - Reports doing fine. Still gets some therapy and feels going ok.   - Denies CP, edema, SOB. Reports had a new urinary catheter this am.   - Reports sleep, appetite and BM are fine.   - RN has no concern today.   - GNP has no concern  --------------------------------  - - Past Medical, social, family histories, medications, and allergies reviewed and updated  - Medications reviewed: in the chart and EHR.   - Case Management:   I have reviewed the care plan and MDS and do agree with the plan. Patient's desire to return to the community is not present.  Information reviewed:  Medications, vital signs, orders, and nursing notes.    MEDICATIONS:  Current Outpatient Medications   Medication Sig Dispense Refill     albuterol (PROVENTIL) (2.5 MG/3ML) 0.083% neb solution Take 2.5 mg by nebulization every 4 hours as needed for shortness of breath / dyspnea or wheezing       aspirin 81 MG EC tablet Take by mouth daily = 120 cc       atorvastatin (LIPITOR) 40 MG tablet Take 40 mg by mouth daily       cinacalcet (SENSIPAR) 30 MG tablet Take 30 mg by mouth daily       clopidogrel (PLAVIX) 75 MG tablet Take 75 mg by mouth daily       clotrimazole (LOTRIMIN) 1 % external cream Apply topically 2 times daily       doxazosin (CARDURA) 1 MG tablet Take 0.5 mg by mouth daily       ferrous sulfate (FEROSUL) 325 (65 Fe)  MG tablet Take 325 mg by mouth daily (with breakfast)       furosemide (LASIX) 20 MG tablet Take 20 mg by mouth daily       metoprolol tartrate (LOPRESSOR) 50 MG tablet Take 50 mg by mouth 2 times daily       nitroGLYcerin (NITROSTAT) 0.4 MG sublingual tablet Place 0.4 mg under the tongue every 5 minutes as needed for chest pain For chest pain place 1 tablet under the tongue every 5 minutes for 3 doses. If symptoms persist 5 minutes after 1st dose call 911.       NUTRITIONAL SUPPLEMENT LIQD Take by mouth 2 times daily       pantoprazole (PROTONIX) 40 MG EC tablet Take 40 mg by mouth daily       tamsulosin (FLOMAX) 0.4 MG capsule Take 0.4 mg by mouth daily       vitamin D3 (CHOLECALCIFEROL) 1000 units (25 mcg) tablet Take 2,000 Units by mouth daily       ROS:  4 point ROS including Respiratory, CV, GI and , other than that noted in the HPI,  is negative    Exam:  Vitals: There were no vitals taken for this visit.  BMI= There is no height or weight on file to calculate BMI.   GENERAL APPEARANCE:  in no distress, cooperative  RESP:  lungs clear to auscultation, no wheezing  CV:  S1S2 audible, irregular HR, no murmur appreciated.   ABDOMEN:  soft, NT/ND, BS audible. no mass appreciated on palpation.   M/S:   Kyphosis,   SKIN:  No rash.   NEURO:   Strength 4/5 right lower extremity and left upper extremity, 5/5 on the left side  PSYCH:  Fair insight, judgement and memory, affect and mood normal       Lab/Diagnostic data: no new lab to review    =========================================================  ASSESSMENT/PLAN  Spastic hemiplegic cerebral palsy (H)  Hx of Concussion without loss of consciousness  Frailty  - chronic right sided weakness, continued to need nursing and medical care, hence now an LTC Resident.   - Significant  Deficits requiring NH placement. Requiring extensive assistance from nursing. Up for meals only o/w spends the day resting in bed  - continue supportive care.        Coronary atherosclerosis  due to lipid rich plaque  S/P carotid endarterectomy  AAA w/o rupture: 6.7 cm  - at baseline.      Obstruction, uropathy  Chronic Indwelling Catheter:  - on Cardura and flomax.      Order:  - See above, otherwise, continue the rest of the current POC.       Electronically signed by:  Ramírez Ibrahim MD        Sincerely,        Ramírez Ibrahim MD

## 2019-04-26 NOTE — PROGRESS NOTES
[facility] [place] [facility]    [place] Medical Record Number:  [id number]  Place of Service where encounter took place:  No question data found.    HPI:    Diallo Villarreal is a 86 year old  (8/31/1932), who is being seen today for a federally mandated E/M visit.  HPI information obtained from: facility staff, patient report and [facility] chart review    Today's concerns are:  - GNP reports Resident completed rehab program in the TCU, and due to continued need for care, admitted to LTC.   -  - Resident seen and examined.   - Reports doing fine. Still gets some therapy and feels going ok.   - Denies CP, edema, SOB. Reports had a new urinary catheter this am.   - Reports sleep, appetite and BM are fine.   - RN has no concern today.   - GNP has no concern  --------------------------------  - - Past Medical, social, family histories, medications, and allergies reviewed and updated  - Medications reviewed: in the chart and EHR.   - Case Management:   I have reviewed the care plan and MDS and do agree with the plan. Patient's desire to return to the community is not present.  Information reviewed:  Medications, vital signs, orders, and nursing notes.    MEDICATIONS:  Current Outpatient Medications   Medication Sig Dispense Refill     albuterol (PROVENTIL) (2.5 MG/3ML) 0.083% neb solution Take 2.5 mg by nebulization every 4 hours as needed for shortness of breath / dyspnea or wheezing       aspirin 81 MG EC tablet Take by mouth daily = 120 cc       atorvastatin (LIPITOR) 40 MG tablet Take 40 mg by mouth daily       cinacalcet (SENSIPAR) 30 MG tablet Take 30 mg by mouth daily       clopidogrel (PLAVIX) 75 MG tablet Take 75 mg by mouth daily       clotrimazole (LOTRIMIN) 1 % external cream Apply topically 2 times daily       doxazosin (CARDURA) 1 MG tablet Take 0.5 mg by mouth daily       ferrous sulfate (FEROSUL) 325 (65 Fe) MG tablet Take 325 mg by mouth daily (with breakfast)       furosemide (LASIX) 20 MG tablet Take  20 mg by mouth daily       metoprolol tartrate (LOPRESSOR) 50 MG tablet Take 50 mg by mouth 2 times daily       nitroGLYcerin (NITROSTAT) 0.4 MG sublingual tablet Place 0.4 mg under the tongue every 5 minutes as needed for chest pain For chest pain place 1 tablet under the tongue every 5 minutes for 3 doses. If symptoms persist 5 minutes after 1st dose call 911.       NUTRITIONAL SUPPLEMENT LIQD Take by mouth 2 times daily       pantoprazole (PROTONIX) 40 MG EC tablet Take 40 mg by mouth daily       tamsulosin (FLOMAX) 0.4 MG capsule Take 0.4 mg by mouth daily       vitamin D3 (CHOLECALCIFEROL) 1000 units (25 mcg) tablet Take 2,000 Units by mouth daily       ROS:  4 point ROS including Respiratory, CV, GI and , other than that noted in the HPI,  is negative    Exam:  Vitals: There were no vitals taken for this visit.  BMI= There is no height or weight on file to calculate BMI.   GENERAL APPEARANCE:  in no distress, cooperative  RESP:  lungs clear to auscultation, no wheezing  CV:  S1S2 audible, irregular HR, no murmur appreciated.   ABDOMEN:  soft, NT/ND, BS audible. no mass appreciated on palpation.   M/S:   Kyphosis,   SKIN:  No rash.   NEURO:   Strength 4/5 right lower extremity and left upper extremity, 5/5 on the left side  PSYCH:  Fair insight, judgement and memory, affect and mood normal       Lab/Diagnostic data: no new lab to review    =========================================================  ASSESSMENT/PLAN  Spastic hemiplegic cerebral palsy (H)  Hx of Concussion without loss of consciousness  Frailty  - chronic right sided weakness, continued to need nursing and medical care, hence now an LTC Resident.   - Significant  Deficits requiring NH placement. Requiring extensive assistance from nursing. Up for meals only o/w spends the day resting in bed  - continue supportive care.        Coronary atherosclerosis due to lipid rich plaque  S/P carotid endarterectomy  AAA w/o rupture: 6.7 cm  - at baseline.       Obstruction, uropathy  Chronic Indwelling Catheter:  - on Cardura and flomax.      Order:  - See above, otherwise, continue the rest of the current POC.       Electronically signed by:  Ramírez Ibrahim MD

## 2019-04-30 ENCOUNTER — HOSPITAL LABORATORY (OUTPATIENT)
Dept: NURSING HOME | Facility: OTHER | Age: 84
End: 2019-04-30

## 2019-04-30 LAB
ALBUMIN UR-MCNC: 30 MG/DL
ANION GAP SERPL CALCULATED.3IONS-SCNC: 7 MMOL/L (ref 3–14)
APPEARANCE UR: ABNORMAL
BACTERIA #/AREA URNS HPF: ABNORMAL /HPF
BASOPHILS # BLD AUTO: 0 10E9/L (ref 0–0.2)
BASOPHILS NFR BLD AUTO: 0.2 %
BILIRUB UR QL STRIP: NEGATIVE
BUN SERPL-MCNC: 27 MG/DL (ref 7–30)
CALCIUM SERPL-MCNC: 8.8 MG/DL (ref 8.5–10.1)
CHLORIDE SERPL-SCNC: 110 MMOL/L (ref 94–109)
CO2 SERPL-SCNC: 26 MMOL/L (ref 20–32)
COLOR UR AUTO: YELLOW
CREAT SERPL-MCNC: 1.01 MG/DL (ref 0.66–1.25)
DIFFERENTIAL METHOD BLD: ABNORMAL
EOSINOPHIL NFR BLD AUTO: 12.8 %
ERYTHROCYTE [DISTWIDTH] IN BLOOD BY AUTOMATED COUNT: 17.3 % (ref 10–15)
GFR SERPL CREATININE-BSD FRML MDRD: 67 ML/MIN/{1.73_M2}
GLUCOSE SERPL-MCNC: 84 MG/DL (ref 70–99)
GLUCOSE UR STRIP-MCNC: NEGATIVE MG/DL
HCT VFR BLD AUTO: 29.8 % (ref 40–53)
HGB BLD-MCNC: 9.4 G/DL (ref 13.3–17.7)
HGB UR QL STRIP: ABNORMAL
IMM GRANULOCYTES # BLD: 0 10E9/L (ref 0–0.4)
IMM GRANULOCYTES NFR BLD: 0.2 %
KETONES UR STRIP-MCNC: NEGATIVE MG/DL
LEUKOCYTE ESTERASE UR QL STRIP: ABNORMAL
LYMPHOCYTES # BLD AUTO: 1.4 10E9/L (ref 0.8–5.3)
LYMPHOCYTES NFR BLD AUTO: 16.7 %
MCH RBC QN AUTO: 26.3 PG (ref 26.5–33)
MCHC RBC AUTO-ENTMCNC: 31.5 G/DL (ref 31.5–36.5)
MCV RBC AUTO: 84 FL (ref 78–100)
MONOCYTES # BLD AUTO: 0.7 10E9/L (ref 0–1.3)
MONOCYTES NFR BLD AUTO: 8.9 %
MUCOUS THREADS #/AREA URNS LPF: PRESENT /LPF
NEUTROPHILS # BLD AUTO: 5 10E9/L (ref 1.6–8.3)
NEUTROPHILS NFR BLD AUTO: 61.2 %
NITRATE UR QL: NEGATIVE
NRBC # BLD AUTO: 0 10*3/UL
NRBC BLD AUTO-RTO: 0 /100
PH UR STRIP: 8 PH (ref 5–7)
PLATELET # BLD AUTO: 237 10E9/L (ref 150–450)
POTASSIUM SERPL-SCNC: 3.6 MMOL/L (ref 3.4–5.3)
RBC # BLD AUTO: 3.57 10E12/L (ref 4.4–5.9)
RBC #/AREA URNS AUTO: 165 /HPF (ref 0–2)
SODIUM SERPL-SCNC: 143 MMOL/L (ref 133–144)
SOURCE: ABNORMAL
SP GR UR STRIP: 1.01 (ref 1–1.03)
SQUAMOUS #/AREA URNS AUTO: 1 /HPF (ref 0–1)
UROBILINOGEN UR STRIP-MCNC: 0 MG/DL (ref 0–2)
WBC # BLD AUTO: 8.2 10E9/L (ref 4–11)
WBC #/AREA URNS AUTO: >182 /HPF (ref 0–5)
WBC CLUMPS #/AREA URNS HPF: PRESENT /HPF

## 2019-05-01 ENCOUNTER — NURSING HOME VISIT (OUTPATIENT)
Dept: GERIATRICS | Facility: CLINIC | Age: 84
End: 2019-05-01
Payer: COMMERCIAL

## 2019-05-01 ENCOUNTER — HOSPITAL LABORATORY (OUTPATIENT)
Dept: NURSING HOME | Facility: OTHER | Age: 84
End: 2019-05-01

## 2019-05-01 VITALS
HEIGHT: 74 IN | OXYGEN SATURATION: 93 % | DIASTOLIC BLOOD PRESSURE: 52 MMHG | HEART RATE: 71 BPM | SYSTOLIC BLOOD PRESSURE: 94 MMHG | RESPIRATION RATE: 18 BRPM | WEIGHT: 188.8 LBS | BODY MASS INDEX: 24.23 KG/M2 | TEMPERATURE: 96.1 F

## 2019-05-01 DIAGNOSIS — Z97.8 CHRONIC INDWELLING FOLEY CATHETER: ICD-10-CM

## 2019-05-01 DIAGNOSIS — R33.9 RETENTION OF URINE: ICD-10-CM

## 2019-05-01 DIAGNOSIS — N13.9 OBSTRUCTION, UROPATHY: Primary | ICD-10-CM

## 2019-05-01 DIAGNOSIS — R20.3 SENSITIVE SKIN: ICD-10-CM

## 2019-05-01 DIAGNOSIS — L29.9 SKIN PRURITUS: ICD-10-CM

## 2019-05-01 DIAGNOSIS — L20.9 ATOPIC DERMATITIS, UNSPECIFIED TYPE: ICD-10-CM

## 2019-05-01 PROCEDURE — 99309 SBSQ NF CARE MODERATE MDM 30: CPT | Performed by: NURSE PRACTITIONER

## 2019-05-01 ASSESSMENT — MIFFLIN-ST. JEOR: SCORE: 1606.14

## 2019-05-01 NOTE — LETTER
5/1/2019        RE: Diallo Villarreal  [address]  [address]  [place] [address]        [place] [facility]  [place] Medical Record Number:  [id number]  Place of Service where encounter took place:  [facility] (S) [[id number]]  Chief Complaint   Patient presents with     Nursing Home Acute       HPI:    Diallo Villarreal  is a 86 year old (8/31/1932), who is being seen today for an episodic care visit.  HPI information obtained from: facility chart records, facility staff, patient report, [facility] chart review and Care Everywhere [facility] chart review. Today's concern is:  Obstruction, uropathy  Chronic indwelling Armendariz catheter  Retention of urine  Nursing reporting hematuria with clots and need for frequent flushing of indwelling catheter.  UA sent yesterday:  UA RESULTS:  Recent Labs   Lab Test 04/30/19  1100   COLOR Yellow   APPEARANCE Cloudy   URINEGLC Negative   URINEBILI Negative   URINEKETONE Negative   SG 1.010   UBLD Large*   URINEPH 8.0*   PROTEIN 30*   NITRITE Negative   LEUKEST Large*   RBCU 165*   WBCU >182*     On doxazosin 0.5 mg daily, tamsulosin 0.4 mg daily    History:  Patient had a stent placed to LAD following stages procedure of the RCA 9/17/18. He is on Plavix x 1 year, ASA 81 mg daily indefinitely.  Cardiology is recommending that if needing to hold both ASA and Plavix for >7 days, would need to wait until July, 2019 for any procedure.       Atopic dermatitis, unspecified type  Skin pruritus  Nursing reporting that patient has weeping rash to bilat LEs.  Today's exam with flakey, severe dryness to LEs, no drainage, mild redness.  Nursing reporting skin is improved today. Patient notes mild pruritis.       Patient also reports extreme pruritis to his back.       No recent changes in medications.       Past Medical and Surgical History reviewed in Epic today.    MEDICATIONS:  Current Outpatient Medications   Medication Sig Dispense Refill     albuterol  "(PROVENTIL) (2.5 MG/3ML) 0.083% neb solution Take 2.5 mg by nebulization every 4 hours as needed for shortness of breath / dyspnea or wheezing       aspirin 81 MG EC tablet Take 81 mg by mouth daily        atorvastatin (LIPITOR) 40 MG tablet Take 40 mg by mouth daily       cinacalcet (SENSIPAR) 30 MG tablet Take 30 mg by mouth daily       clopidogrel (PLAVIX) 75 MG tablet Take 75 mg by mouth daily       clotrimazole (LOTRIMIN) 1 % external cream Apply topically 2 times daily       doxazosin (CARDURA) 1 MG tablet Take 0.5 mg by mouth daily       ferrous sulfate (FEROSUL) 325 (65 Fe) MG tablet Take 325 mg by mouth daily (with breakfast)       furosemide (LASIX) 20 MG tablet Take 20 mg by mouth daily       metoprolol tartrate (LOPRESSOR) 50 MG tablet Take 50 mg by mouth 2 times daily       nitroGLYcerin (NITROSTAT) 0.4 MG sublingual tablet Place 0.4 mg under the tongue every 5 minutes as needed for chest pain For chest pain place 1 tablet under the tongue every 5 minutes for 3 doses. If symptoms persist 5 minutes after 1st dose call 911.       NUTRITIONAL SUPPLEMENT LIQD Take 8 oz by mouth 2 times daily        pantoprazole (PROTONIX) 40 MG EC tablet Take 40 mg by mouth daily       tamsulosin (FLOMAX) 0.4 MG capsule Take 0.4 mg by mouth daily       vitamin D3 (CHOLECALCIFEROL) 1000 units (25 mcg) tablet Take 2,000 Units by mouth daily       REVIEW OF SYSTEMS:  10 point ROS of systems including Constitutional, Eyes, Respiratory, Cardiovascular, Gastroenterology, Genitourinary, Integumentary, Musculoskeletal, Psychiatric were all negative except for pertinent positives noted in my HPI.    Objective:  BP 94/52   Pulse 71   Temp 96.1  F (35.6  C)   Resp 18   Ht 1.88 m (6' 2\")   Wt 85.6 kg (188 lb 12.8 oz)   SpO2 93%   BMI 24.24 kg/m     Exam:  GENERAL APPEARANCE:  Alert, in no distress, frail  RESP:  respiratory effort and palpation of chest normal, auscultation of lungs clear , no respiratory distress  CV:  " "Palpation and auscultation of heart done , rate and rhythm regular, no murmur, no LE peripheral edema  ABDOMEN:  normal bowel sounds, soft, nontender, no hepatosplenomegaly or other masses  M/S:   Gait and station with W/C for mobility, Digits and nails with arthritic changes, reduced muscle mass  SKIN:  Inspection and Palpation of skin and subcutaneous tissue with mild redness to back in no particular pattern. Extreme dryness to LEs with flakey skin, no drainage.   PSYCH:  insight and judgement, memory intact , affect and mood normal, follows commands readily         Labs:   Recent labs in [facility] reviewed by me today.     ASSESSMENT/PLAN:  Obstruction, uropathy  Chronic indwelling Armendariz catheter  Retention of urine  Spoke with family who reported Cardiology's recommendation for no procedures that would require ASA and Plavix to be stopped before 7/1/19.    Will monitor for UC results; likely will need antibiotics but will make sure patient is not colonized before moving forward with antibiotics.     Atopic dermatitis, unspecified type  Skin pruritus  Sensitive Skin  LEs appear to be eczema-related atopic dermatitis.    Back appears to be more generalized atopic dermatitis.   Family reports patient at previous facility was using \"Purex all Clear\" laundry detergent and Dove bar soup for sensitive skin for bath soap.  Will order sensitive skin regimen for laundry and bathing.  Patient already using Cavilon extra dry cream BID and PRN. Can order benadryl cream for back and Kenalog cream for LEs with Prednisone burst as patient reporting the pruritis on his back is extreme.    Will monitor usage and duration of therapy with Kenalog cream to assure short-term usage.       Orders written by provider at facility and transcribed by : Magaly Polo MA  1.  Benadryl cream topically to back TID x 5 days.and BID PRN.  Dx: atopic dermatitis  2.  Prednisone 20 mg po every day x 5 days.  Dx: atopic dermatitis  3. "  Kenalog 0.1% cream BID to lower legs until resolved, then discontinue.  Update in 10 days if still using.  Dx: eczema, moderate  4. Clothes to be laundered in Dreft Laundry soup.   5. Sensitive Skin bath soap for bathing. Dx: dermatitis.     Electronically signed by:  ROCÍO Larsen CNP               Sincerely,        ROCÍO Larsen CNP

## 2019-05-01 NOTE — PROGRESS NOTES
[place] [facility]  [place] Medical Record Number:  [id number]  [facility] where encounter took place[facility][id number][facility]  Chief Complaint   Patient presents with     Nursing Home Acute       HPI:    Diallo Villarreal  is a 86 year old (8/31/1932), who is being seen today for an episodic care visit.  HPI information obtained from: facility chart records, facility staff, patient report, [facility] chart review and Care Everywhere [facility] chart review. Today's concern is:  Obstruction, uropathy  Chronic indwelling Armendariz catheter  Retention of urine  Nursing reporting hematuria with clots and need for frequent flushing of indwelling catheter.  UA sent yesterday:  UA RESULTS:  Recent Labs   Lab Test 04/30/19  1100   COLOR Yellow   APPEARANCE Cloudy   URINEGLC Negative   URINEBILI Negative   URINEKETONE Negative   SG 1.010   UBLD Large*   URINEPH 8.0*   PROTEIN 30*   NITRITE Negative   LEUKEST Large*   RBCU 165*   WBCU >182*     On doxazosin 0.5 mg daily, tamsulosin 0.4 mg daily    History:  Patient had a stent placed to LAD following stages procedure of the RCA 9/17/18. He is on Plavix x 1 year, ASA 81 mg daily indefinitely.  Cardiology is recommending that if needing to hold both ASA and Plavix for >7 days, would need to wait until July, 2019 for any procedure.       Atopic dermatitis, unspecified type  Skin pruritus  Nursing reporting that patient has weeping rash to bilat LEs.  Today's exam with flakey, severe dryness to LEs, no drainage, mild redness.  Nursing reporting skin is improved today. Patient notes mild pruritis.       Patient also reports extreme pruritis to his back.       No recent changes in medications.       Past Medical and Surgical History reviewed in Epic today.    MEDICATIONS:  Current Outpatient Medications   Medication Sig Dispense Refill     albuterol (PROVENTIL) (2.5 MG/3ML) 0.083% neb solution Take 2.5 mg by nebulization every 4 hours as needed  "for shortness of breath / dyspnea or wheezing       aspirin 81 MG EC tablet Take 81 mg by mouth daily        atorvastatin (LIPITOR) 40 MG tablet Take 40 mg by mouth daily       cinacalcet (SENSIPAR) 30 MG tablet Take 30 mg by mouth daily       clopidogrel (PLAVIX) 75 MG tablet Take 75 mg by mouth daily       clotrimazole (LOTRIMIN) 1 % external cream Apply topically 2 times daily       doxazosin (CARDURA) 1 MG tablet Take 0.5 mg by mouth daily       ferrous sulfate (FEROSUL) 325 (65 Fe) MG tablet Take 325 mg by mouth daily (with breakfast)       furosemide (LASIX) 20 MG tablet Take 20 mg by mouth daily       metoprolol tartrate (LOPRESSOR) 50 MG tablet Take 50 mg by mouth 2 times daily       nitroGLYcerin (NITROSTAT) 0.4 MG sublingual tablet Place 0.4 mg under the tongue every 5 minutes as needed for chest pain For chest pain place 1 tablet under the tongue every 5 minutes for 3 doses. If symptoms persist 5 minutes after 1st dose call 911.       NUTRITIONAL SUPPLEMENT LIQD Take 8 oz by mouth 2 times daily        pantoprazole (PROTONIX) 40 MG EC tablet Take 40 mg by mouth daily       tamsulosin (FLOMAX) 0.4 MG capsule Take 0.4 mg by mouth daily       vitamin D3 (CHOLECALCIFEROL) 1000 units (25 mcg) tablet Take 2,000 Units by mouth daily       REVIEW OF SYSTEMS:  10 point ROS of systems including Constitutional, Eyes, Respiratory, Cardiovascular, Gastroenterology, Genitourinary, Integumentary, Musculoskeletal, Psychiatric were all negative except for pertinent positives noted in my HPI.    Objective:  BP 94/52   Pulse 71   Temp 96.1  F (35.6  C)   Resp 18   Ht 1.88 m (6' 2\")   Wt 85.6 kg (188 lb 12.8 oz)   SpO2 93%   BMI 24.24 kg/m    Exam:  GENERAL APPEARANCE:  Alert, in no distress, frail  RESP:  respiratory effort and palpation of chest normal, auscultation of lungs clear , no respiratory distress  CV:  Palpation and auscultation of heart done , rate and rhythm regular, no murmur, no LE peripheral " "edema  ABDOMEN:  normal bowel sounds, soft, nontender, no hepatosplenomegaly or other masses  M/S:   Gait and station with W/C for mobility, Digits and nails with arthritic changes, reduced muscle mass  SKIN:  Inspection and Palpation of skin and subcutaneous tissue with mild redness to back in no particular pattern. Extreme dryness to LEs with flakey skin, no drainage.   PSYCH:  insight and judgement, memory intact , affect and mood normal, follows commands readily         Labs:   Recent labs in [facility] reviewed by me today.     ASSESSMENT/PLAN:  Obstruction, uropathy  Chronic indwelling Armendariz catheter  Retention of urine  Spoke with family who reported Cardiology's recommendation for no procedures that would require ASA and Plavix to be stopped before 7/1/19.    Will monitor for UC results; likely will need antibiotics but will make sure patient is not colonized before moving forward with antibiotics.     Atopic dermatitis, unspecified type  Skin pruritus  Sensitive Skin  LEs appear to be eczema-related atopic dermatitis.    Back appears to be more generalized atopic dermatitis.   Family reports patient at previous facility was using \"Purex all Clear\" laundry detergent and Dove bar soup for sensitive skin for bath soap.  Will order sensitive skin regimen for laundry and bathing.  Patient already using Cavilon extra dry cream BID and PRN. Can order benadryl cream for back and Kenalog cream for LEs with Prednisone burst as patient reporting the pruritis on his back is extreme.    Will monitor usage and duration of therapy with Kenalog cream to assure short-term usage.       Orders written by provider at facility and transcribed by : Magaly Polo MA  1.  Benadryl cream topically to back TID x 5 days.and BID PRN.  Dx: atopic dermatitis  2.  Prednisone 20 mg po every day x 5 days.  Dx: atopic dermatitis  3.  Kenalog 0.1% cream BID to lower legs until resolved, then discontinue.  Update in 10 days if " still using.  Dx: eczema, moderate  4. Clothes to be laundered in Dreft Laundry soup.   5. Sensitive Skin bath soap for bathing. Dx: dermatitis.     Electronically signed by:  ROCÍO Larsen CNP

## 2019-05-02 LAB
BACTERIA SPEC CULT: ABNORMAL
BACTERIA SPEC CULT: ABNORMAL
Lab: ABNORMAL
SPECIMEN SOURCE: ABNORMAL

## 2019-05-03 ENCOUNTER — NURSING HOME VISIT (OUTPATIENT)
Dept: GERIATRICS | Facility: CLINIC | Age: 84
End: 2019-05-03
Payer: COMMERCIAL

## 2019-05-03 VITALS
HEART RATE: 78 BPM | HEIGHT: 74 IN | DIASTOLIC BLOOD PRESSURE: 57 MMHG | SYSTOLIC BLOOD PRESSURE: 113 MMHG | OXYGEN SATURATION: 93 % | TEMPERATURE: 97 F | BODY MASS INDEX: 24.23 KG/M2 | RESPIRATION RATE: 20 BRPM | WEIGHT: 188.8 LBS

## 2019-05-03 DIAGNOSIS — Z97.8 CHRONIC INDWELLING FOLEY CATHETER: ICD-10-CM

## 2019-05-03 DIAGNOSIS — N30.01 ACUTE CYSTITIS WITH HEMATURIA: Primary | ICD-10-CM

## 2019-05-03 DIAGNOSIS — N13.9 OBSTRUCTION, UROPATHY: ICD-10-CM

## 2019-05-03 PROCEDURE — 99308 SBSQ NF CARE LOW MDM 20: CPT | Performed by: NURSE PRACTITIONER

## 2019-05-03 RX ORDER — CEFPODOXIME PROXETIL 200 MG/1
200 TABLET, FILM COATED ORAL 2 TIMES DAILY
COMMUNITY
Start: 2019-05-03 | End: 2019-09-05

## 2019-05-03 ASSESSMENT — MIFFLIN-ST. JEOR: SCORE: 1606.14

## 2019-05-03 NOTE — LETTER
5/3/2019        RE: Diallo Villarreal  [address]  [address]  [place] [address]        [place] [facility]  [place] Medical Record Number:  [id number]  Place of Service where encounter took place[facility] [[id number][facility]  Chief Complaint   Patient presents with     Nursing Home Acute       HPI:    Diallo Villarreal  is a 86 year old (1932), who is being seen today for an episodic care visit.  HPI information obtained from: facility chart records, facility staff, patient report and [facility] chart review. Today's concern is:     Acute cystitis with hematuria  Chronic indwelling Armendariz catheter  Obstruction, uropathy     Patient with recent hematuria, UA/UC sent.  Patient has a chronic indwelling catheter for obstructive uropathy, possible plan for suprapubic after 19.     UA RESULTS:  Recent Labs   Lab Test 19  1100   COLOR Yellow   APPEARANCE Cloudy   URINEGLC Negative   URINEBILI Negative   URINEKETONE Negative   SG 1.010   UBLD Large*   URINEPH 8.0*   PROTEIN 30*   NITRITE Negative   LEUKEST Large*   RBCU 165*   WBCU >182*     UC showin,000 to 100,000 colonies/mL   Proteus mirabilis and 10,000 to 50,000 colonies/mL   Klebsiella pneumoniae .    Nursing reporting patient had TMax last night of 100.2 and was lethargic, leaving to the right in his W/C this AM.     Past Medical and Surgical History reviewed in Epic today.    MEDICATIONS:  Current Outpatient Medications   Medication Sig Dispense Refill     albuterol (PROVENTIL) (2.5 MG/3ML) 0.083% neb solution Take 2.5 mg by nebulization every 4 hours as needed for shortness of breath / dyspnea or wheezing       aspirin 81 MG EC tablet Take 81 mg by mouth daily        atorvastatin (LIPITOR) 40 MG tablet Take 40 mg by mouth daily       cefpodoxime (VANTIN) 200 MG tablet Take 200 mg by mouth 2 times daily       cinacalcet (SENSIPAR) 30 MG tablet Take 30 mg by mouth daily       clopidogrel (PLAVIX) 75 MG  "tablet Take 75 mg by mouth daily       clotrimazole (LOTRIMIN) 1 % external cream Apply topically 2 times daily       doxazosin (CARDURA) 1 MG tablet Take 0.5 mg by mouth daily       ferrous sulfate (FEROSUL) 325 (65 Fe) MG tablet Take 325 mg by mouth daily (with breakfast)       furosemide (LASIX) 20 MG tablet Take 20 mg by mouth daily       metoprolol tartrate (LOPRESSOR) 50 MG tablet Take 50 mg by mouth 2 times daily       nitroGLYcerin (NITROSTAT) 0.4 MG sublingual tablet Place 0.4 mg under the tongue every 5 minutes as needed for chest pain For chest pain place 1 tablet under the tongue every 5 minutes for 3 doses. If symptoms persist 5 minutes after 1st dose call 911.       NUTRITIONAL SUPPLEMENT LIQD Take 8 oz by mouth 2 times daily        pantoprazole (PROTONIX) 40 MG EC tablet Take 40 mg by mouth daily       tamsulosin (FLOMAX) 0.4 MG capsule Take 0.4 mg by mouth daily       vitamin D3 (CHOLECALCIFEROL) 1000 units (25 mcg) tablet Take 2,000 Units by mouth daily       REVIEW OF SYSTEMS:  4 point ROS including Respiratory, CV, GI and , other than that noted in the HPI,  is negative    Objective:  /57   Pulse 78   Temp 97  F (36.1  C)   Resp 20   Ht 1.88 m (6' 2\")   Wt 85.6 kg (188 lb 12.8 oz)   SpO2 93%   BMI 24.24 kg/m     Exam:  GENERAL APPEARANCE:  in no distress  RESP:  respiratory effort normal, no respiratory distress  CV:  No peripheral edema  ABDOMEN:  nondistended  M/S:   Gait and station with W/C for mobility, Digits and nails with arthritic changes, reduced muscle mass  SKIN:  Inspection and Palpation of skin and subcutaneous tissue with persistent eczema to LEs, dry,   PSYCH:  insight and judgement, memory intact , affect and mood normal, follows commands readily           Labs:   Recent labs in [facility] reviewed by me today.     ASSESSMENT/PLAN:     Acute cystitis with hematuria  Chronic indwelling Armendariz catheter  Obstruction, uropathy     UC showing sensitivity to many cephalosporins. "  Patient has no drug allergies.  Will treat with antibiotics and monitor for improvement as patient is symptomatic.     Orders written by provider at facility and transcribed by : Magaly Polo MA  1.  Cefpodoxime 200 mg po BID x 7 days.  Dx: UTI, indwelling catheter     Electronically signed by:  ROCÍO Larsen CNP               Sincerely,        ROCÍO Larsen CNP

## 2019-05-03 NOTE — PROGRESS NOTES
[place] [facility]  [place] Medical Record Number:  [id number]  [facility] of Service where encounter took place[facility] [[id number][facility]  Chief Complaint   Patient presents with     Nursing Home Acute       HPI:    Diallo Villarreal  is a 86 year old (1932), who is being seen today for an episodic care visit.  HPI information obtained from: facility chart records, facility staff, patient report and [facility] chart review. Today's concern is:     Acute cystitis with hematuria  Chronic indwelling Armendariz catheter  Obstruction, uropathy     Patient with recent hematuria, UA/UC sent.  Patient has a chronic indwelling catheter for obstructive uropathy, possible plan for suprapubic after 19.     UA RESULTS:  Recent Labs   Lab Test 19  1100   COLOR Yellow   APPEARANCE Cloudy   URINEGLC Negative   URINEBILI Negative   URINEKETONE Negative   SG 1.010   UBLD Large*   URINEPH 8.0*   PROTEIN 30*   NITRITE Negative   LEUKEST Large*   RBCU 165*   WBCU >182*     UC showin,000 to 100,000 colonies/mL   Proteus mirabilis and 10,000 to 50,000 colonies/mL   Klebsiella pneumoniae .    Nursing reporting patient had TMax last night of 100.2 and was lethargic, leaving to the right in his W/C this AM.     Past Medical and Surgical History reviewed in Epic today.    MEDICATIONS:  Current Outpatient Medications   Medication Sig Dispense Refill     albuterol (PROVENTIL) (2.5 MG/3ML) 0.083% neb solution Take 2.5 mg by nebulization every 4 hours as needed for shortness of breath / dyspnea or wheezing       aspirin 81 MG EC tablet Take 81 mg by mouth daily        atorvastatin (LIPITOR) 40 MG tablet Take 40 mg by mouth daily       cefpodoxime (VANTIN) 200 MG tablet Take 200 mg by mouth 2 times daily       cinacalcet (SENSIPAR) 30 MG tablet Take 30 mg by mouth daily       clopidogrel (PLAVIX) 75 MG tablet Take 75 mg by mouth daily       clotrimazole (LOTRIMIN) 1 % external cream Apply topically  "2 times daily       doxazosin (CARDURA) 1 MG tablet Take 0.5 mg by mouth daily       ferrous sulfate (FEROSUL) 325 (65 Fe) MG tablet Take 325 mg by mouth daily (with breakfast)       furosemide (LASIX) 20 MG tablet Take 20 mg by mouth daily       metoprolol tartrate (LOPRESSOR) 50 MG tablet Take 50 mg by mouth 2 times daily       nitroGLYcerin (NITROSTAT) 0.4 MG sublingual tablet Place 0.4 mg under the tongue every 5 minutes as needed for chest pain For chest pain place 1 tablet under the tongue every 5 minutes for 3 doses. If symptoms persist 5 minutes after 1st dose call 911.       NUTRITIONAL SUPPLEMENT LIQD Take 8 oz by mouth 2 times daily        pantoprazole (PROTONIX) 40 MG EC tablet Take 40 mg by mouth daily       tamsulosin (FLOMAX) 0.4 MG capsule Take 0.4 mg by mouth daily       vitamin D3 (CHOLECALCIFEROL) 1000 units (25 mcg) tablet Take 2,000 Units by mouth daily       REVIEW OF SYSTEMS:  4 point ROS including Respiratory, CV, GI and , other than that noted in the HPI,  is negative    Objective:  /57   Pulse 78   Temp 97  F (36.1  C)   Resp 20   Ht 1.88 m (6' 2\")   Wt 85.6 kg (188 lb 12.8 oz)   SpO2 93%   BMI 24.24 kg/m    Exam:  GENERAL APPEARANCE:  in no distress  RESP:  respiratory effort normal, no respiratory distress  CV:  No peripheral edema  ABDOMEN:  nondistended  M/S:   Gait and station with W/C for mobility, Digits and nails with arthritic changes, reduced muscle mass  SKIN:  Inspection and Palpation of skin and subcutaneous tissue with persistent eczema to LEs, dry,   PSYCH:  insight and judgement, memory intact , affect and mood normal, follows commands readily           Labs:   Recent labs in [facility] reviewed by me today.     ASSESSMENT/PLAN:     Acute cystitis with hematuria  Chronic indwelling Armendariz catheter  Obstruction, uropathy     UC showing sensitivity to many cephalosporins.  Patient has no drug allergies.  Will treat with antibiotics and monitor for improvement as " patient is symptomatic.     Orders written by provider at facility and transcribed by : Magaly Polo MA  1.  Cefpodoxime 200 mg po BID x 7 days.  Dx: UTI, indwelling catheter     Electronically signed by:  ROCÍO Larsen CNP

## 2019-05-07 ENCOUNTER — NURSING HOME VISIT (OUTPATIENT)
Dept: GERIATRICS | Facility: CLINIC | Age: 84
End: 2019-05-07
Payer: COMMERCIAL

## 2019-05-07 VITALS
DIASTOLIC BLOOD PRESSURE: 72 MMHG | OXYGEN SATURATION: 93 % | BODY MASS INDEX: 24.23 KG/M2 | HEART RATE: 59 BPM | SYSTOLIC BLOOD PRESSURE: 136 MMHG | WEIGHT: 188.8 LBS | HEIGHT: 74 IN | TEMPERATURE: 98.8 F | RESPIRATION RATE: 15 BRPM

## 2019-05-07 DIAGNOSIS — Z98.890 S/P CAROTID ENDARTERECTOMY: ICD-10-CM

## 2019-05-07 DIAGNOSIS — G80.2 SPASTIC HEMIPLEGIC CEREBRAL PALSY (H): Primary | ICD-10-CM

## 2019-05-07 DIAGNOSIS — L20.9 ATOPIC DERMATITIS, UNSPECIFIED TYPE: ICD-10-CM

## 2019-05-07 DIAGNOSIS — I10 ESSENTIAL HYPERTENSION: ICD-10-CM

## 2019-05-07 DIAGNOSIS — I25.83 CORONARY ATHEROSCLEROSIS DUE TO LIPID RICH PLAQUE: ICD-10-CM

## 2019-05-07 DIAGNOSIS — I71.40 ABDOMINAL AORTIC ANEURYSM (AAA) WITHOUT RUPTURE (H): ICD-10-CM

## 2019-05-07 PROCEDURE — 99309 SBSQ NF CARE MODERATE MDM 30: CPT | Performed by: NURSE PRACTITIONER

## 2019-05-07 ASSESSMENT — MIFFLIN-ST. JEOR: SCORE: 1606.14

## 2019-05-07 NOTE — PROGRESS NOTES
[facility]  Chief Complaint   Patient presents with     snf Regulatory     [place] Medical Record Number:  [id number]  Place of Service where encounter took place:  [facility] ([facility]) [[id number]]      HPI:    Diallo Villarreal  is 86 year old (8/31/1932), who is being seen today for a federally mandated E/M visit.  HPI information obtained from: facility chart records, facility staff, patient report and [facility] chart review. 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    Previously lived in a group home but moved to SNF LTC after TCU stay for increased care needs    Abdominal aortic aneurysm (AAA) without rupture (H)  Per History:  F/b: Dr Reyes Enrique, Vascular Surgery, [facility]      3/2/19 CT Abdomen/Pel noting 6.7 cm AAA  Cardiology notes indicate need for open repair of AAA so patient was then sent to Cardiology for surgery clearance.  Stress test was abnormal and was f/b angiogram with stenting in mid LAD followed by staged procedure of RCA at a later date. Ultimately patient elected not to have open repair and follow with surveillance U/S.     Patient denies any CP, HA, lightheadedness, SOB today  He is not SOB upon exam    Is on Plavix 75 mg daily x 1 year (at least until 7/2019), ASA 81 mg daily indefinitely.     Essential hypertension  Coronary atherosclerosis due to lipid rich plaque  S/P carotid endarterectomy  Per epic note/HX:  F/b [facility] - [facility], Dr Satinder Bowman  Per Care Everywhere notes:  1. Coronary artery disease found on abnormal stress test.    9/7/2018 status post atherectomy angioplasty and stenting of the mid LAD.    9/17/2018 angioplasty and stenting of the proximal, mid and distal right coronary artery.   2. AAA measuring 6.4 cm, not amenable to  endovascular therapy, follows with Dr. Reyes Enrique, Vascular Surgery.  3. Carotid artery disease status post left CEA in 2002.  4. Hypertension.  5. Hyperlipidemia.      See above re: AAA POC    Is on Plavix 75 mg daily, ASA 81 mg daily, metoprolol 50 mg BID, Lasix 20 mg daily (no KCl supplement, last K 3.6), nitroglycerin PRN, atorvastatin 40 mg q HS   (patient also on doxazosin 0.5 mg daily for obstructive uropathy/BPH)    BPs 110-130s/60-80s  HRs 70-80s mostly  Patient denies CP, HA, lightheadedness; endorses LE cramps/pains that he reports are improved when standing     Atopic dermatitis, unspecified type  Patient recently visited for dermatitis which appeared to be eczema to LEs and possible atopic dermatitis to back.  Patient was prescribed benadryl cream scheduled x 5 days (done now) and prn, Prednisone x 5 days (done now), and kenalog cream to LEs until resolved - f/u recommended in 10 days to monitor progression with goal to discontinue steroid cream.     Patient reports he feels a lot better, no pruritis.       ALLERGIES:Gluten meal and Wheat extract  PAST MEDICAL HISTORY:   has a past medical history of AAA (abdominal aortic aneurysm) (H), Celiac sprue, and HTN (hypertension).  PAST SURGICAL HISTORY:   has a past surgical history that includes tonsillectomy and LEFT CAROTID ENDARTERECTOMY.  FAMILY HISTORY: family history is not on file.  SOCIAL HISTORY:  reports that he has quit smoking. He has never used smokeless tobacco. He reports that he does not drink alcohol.    MEDICATIONS:  Current Outpatient Medications   Medication Sig Dispense Refill     albuterol (PROVENTIL) (2.5 MG/3ML) 0.083% neb solution Take 2.5 mg by nebulization every 4 hours as needed for shortness of breath / dyspnea or wheezing       aspirin 81 MG EC tablet Take 81 mg by mouth daily        atorvastatin (LIPITOR) 40 MG tablet Take 40 mg by mouth daily       cefpodoxime (VANTIN) 200 MG tablet Take 200 mg by mouth 2 times daily        "cinacalcet (SENSIPAR) 30 MG tablet Take 30 mg by mouth daily       clopidogrel (PLAVIX) 75 MG tablet Take 75 mg by mouth daily       clotrimazole (LOTRIMIN) 1 % external cream Apply topically 2 times daily       doxazosin (CARDURA) 1 MG tablet Take 0.5 mg by mouth daily       ferrous sulfate (FEROSUL) 325 (65 Fe) MG tablet Take 325 mg by mouth daily (with breakfast)       furosemide (LASIX) 20 MG tablet Take 10 mg by mouth daily        metoprolol tartrate (LOPRESSOR) 50 MG tablet Take 50 mg by mouth 2 times daily       nitroGLYcerin (NITROSTAT) 0.4 MG sublingual tablet Place 0.4 mg under the tongue every 5 minutes as needed for chest pain For chest pain place 1 tablet under the tongue every 5 minutes for 3 doses. If symptoms persist 5 minutes after 1st dose call 911.       NUTRITIONAL SUPPLEMENT LIQD Take 8 oz by mouth 2 times daily        pantoprazole (PROTONIX) 40 MG EC tablet Take 40 mg by mouth daily       tamsulosin (FLOMAX) 0.4 MG capsule Take 0.4 mg by mouth daily       vitamin D3 (CHOLECALCIFEROL) 1000 units (25 mcg) tablet Take 2,000 Units by mouth daily         Case Management:  I have reviewed the care plan and MDS and do agree with the plan. Patient's desire to return to the community is present, but is not able due to care needs . Information reviewed:  Medications, vital signs, orders, and nursing notes.    ROS:  10 point ROS of systems including Constitutional, Eyes, Respiratory, Cardiovascular, Gastroenterology, Genitourinary, Integumentary, Musculoskeletal, Psychiatric were all negative except for pertinent positives noted in my HPI.    Vitals:  /72   Pulse 59   Temp 98.8  F (37.1  C)   Resp 15   Ht 1.88 m (6' 2\")   Wt 85.6 kg (188 lb 12.8 oz)   SpO2 93%   BMI 24.24 kg/m    Body mass index is 24.24 kg/m .  Exam:  GENERAL APPEARANCE:  Alert, in no distress, pleasant, happy  RESP:  respiratory effort and palpation of chest normal, auscultation of lungs clear , no respiratory distress  CV: " " Palpation and auscultation of heart done , rate and rhythm regular, no murmur, no  peripheral edema  ABDOMEN:  normal bowel sounds, soft, nontender, no hepatosplenomegaly or other masses  M/S:   Gait and station with W/C for mobility, has right-sided weakness, Digits and nails with arthritic changes, reduced muscle mass  SKIN:  Inspection and Palpation of skin and subcutaneous tissue with much improved dermatitis (resolved on back) with still some dryness and flakey patches on LEs. PVD changes also present  PSYCH:  insight and judgement, memory seemingly intact, affect and mood normal,  follows commands readily         Lab/Diagnostic data:   Labs done in SNF are in [facility] EPIC. Please refer to them using [employer]/Care Everywhere. and Recent labs in EPIC reviewed by me today.     ASSESSMENT/PLAN  Spastic hemiplegic cerebral palsy (H)  Stable  Increased care needs with support from nursing d/t right-sided weakness; patient requires assistance with ADLs from nursing    Abdominal aortic aneurysm (AAA) without rupture (H)  F/u with [facility] for surveillance monitoring  Monitor for sudden change in condition which may require eval of AAA in ED    Essential hypertension  Coronary atherosclerosis due to lipid rich plaque  S/P carotid endarterectomy  Last BMP showing BUN 27, Creat 1.01, GFR 67  Patient reports drinking 64 oz of water/day  He endorses what seems like \"hien horses\" in his LLE which he reports improves when he stands up - this could be related to his Lasix if he is not drinking enough.  BP goals are <150/90 mm Hg.This is higher than ACC and AHA recommendations due to risk for hypotension, risk of dizziness and falls, risk of tissue/cerebral hypoperfusion and frailty. Noted patients BP is lower than goal and will adjust plan of care by reducing Lasix and monitoring.  Will order BMP for monitoring of fluid status and electrolytes.     Atopic dermatitis, unspecified type  Improving.  Kenalog " cream to remain at this time but will discontinue as soon as possible to avoid permanent atrophy.       Orders written by provider at facility and transcribed by : Magaly Polo MA  1.  Decrease Lasix to 10 mg po every day.  Dx: HTN  2.  BMP recheck Friday, 5/10/19.  Dx: HTN        Electronically signed by:  ROCÍO Larsen CNP

## 2019-05-07 NOTE — LETTER
5/7/2019        RE: Diallo Villarreal  [address]  [address]  [place] [address]        [facility]  Chief Complaint   Patient presents with     correction Regulatory     [place] Medical Record Number:  [id number]  Place of Service where encounter took place:  [facility] ([facility]) [[id number]]      HPI:    Diallo Villarreal  is 86 year old (8/31/1932), who is being seen today for a federally mandated E/M visit.  HPI information obtained from: facility chart records, facility staff, patient report and [facility] chart review. 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    Previously lived in a group home but moved to SNF LTC after TCU stay for increased care needs    Abdominal aortic aneurysm (AAA) without rupture (H)  Per History:  F/b: Dr Reyes Enrique, Vascular Surgery, [facility]      3/2/19 CT Abdomen/Pel noting 6.7 cm AAA  Cardiology notes indicate need for open repair of AAA so patient was then sent to Cardiology for surgery clearance.  Stress test was abnormal and was f/b angiogram with stenting in mid LAD followed by staged procedure of RCA at a later date. Ultimately patient elected not to have open repair and follow with surveillance U/S.     Patient denies any CP, HA, lightheadedness, SOB today  He is not SOB upon exam    Is on Plavix 75 mg daily x 1 year (at least until 7/2019), ASA 81 mg daily indefinitely.     Essential hypertension  Coronary atherosclerosis due to lipid rich plaque  S/P carotid endarterectomy  Per epic note/HX:  F/b [facility] and Vascular [facility], Dr Satinder Bowman  Per Care Everywhere notes:  1. Coronary artery disease found on abnormal stress test.    9/7/2018 status post atherectomy angioplasty and stenting of the mid LAD.    9/17/2018 angioplasty and stenting of the  proximal, mid and distal right coronary artery.   2. AAA measuring 6.4 cm, not amenable to endovascular therapy, follows with Dr. Reyes Enrique, Vascular Surgery.  3. Carotid artery disease status post left CEA in 2002.  4. Hypertension.  5. Hyperlipidemia.      See above re: AAA POC    Is on Plavix 75 mg daily, ASA 81 mg daily, metoprolol 50 mg BID, Lasix 20 mg daily (no KCl supplement, last K 3.6), nitroglycerin PRN, atorvastatin 40 mg q HS   (patient also on doxazosin 0.5 mg daily for obstructive uropathy/BPH)    BPs 110-130s/60-80s  HRs 70-80s mostly  Patient denies CP, HA, lightheadedness; endorses LE cramps/pains that he reports are improved when standing     Atopic dermatitis, unspecified type  Patient recently visited for dermatitis which appeared to be eczema to LEs and possible atopic dermatitis to back.  Patient was prescribed benadryl cream scheduled x 5 days (done now) and prn, Prednisone x 5 days (done now), and kenalog cream to LEs until resolved - f/u recommended in 10 days to monitor progression with goal to discontinue steroid cream.     Patient reports he feels a lot better, no pruritis.       ALLERGIES:Gluten meal and Wheat extract  PAST MEDICAL HISTORY:   has a past medical history of AAA (abdominal aortic aneurysm) (H), Celiac sprue, and HTN (hypertension).  PAST SURGICAL HISTORY:   has a past surgical history that includes tonsillectomy and LEFT CAROTID ENDARTERECTOMY.  FAMILY HISTORY: family history is not on file.  SOCIAL HISTORY:  reports that he has quit smoking. He has never used smokeless tobacco. He reports that he does not drink alcohol.    MEDICATIONS:  Current Outpatient Medications   Medication Sig Dispense Refill     albuterol (PROVENTIL) (2.5 MG/3ML) 0.083% neb solution Take 2.5 mg by nebulization every 4 hours as needed for shortness of breath / dyspnea or wheezing       aspirin 81 MG EC tablet Take 81 mg by mouth daily        atorvastatin (LIPITOR) 40 MG tablet Take 40 mg by mouth  "daily       cefpodoxime (VANTIN) 200 MG tablet Take 200 mg by mouth 2 times daily       cinacalcet (SENSIPAR) 30 MG tablet Take 30 mg by mouth daily       clopidogrel (PLAVIX) 75 MG tablet Take 75 mg by mouth daily       clotrimazole (LOTRIMIN) 1 % external cream Apply topically 2 times daily       doxazosin (CARDURA) 1 MG tablet Take 0.5 mg by mouth daily       ferrous sulfate (FEROSUL) 325 (65 Fe) MG tablet Take 325 mg by mouth daily (with breakfast)       furosemide (LASIX) 20 MG tablet Take 10 mg by mouth daily        metoprolol tartrate (LOPRESSOR) 50 MG tablet Take 50 mg by mouth 2 times daily       nitroGLYcerin (NITROSTAT) 0.4 MG sublingual tablet Place 0.4 mg under the tongue every 5 minutes as needed for chest pain For chest pain place 1 tablet under the tongue every 5 minutes for 3 doses. If symptoms persist 5 minutes after 1st dose call 911.       NUTRITIONAL SUPPLEMENT LIQD Take 8 oz by mouth 2 times daily        pantoprazole (PROTONIX) 40 MG EC tablet Take 40 mg by mouth daily       tamsulosin (FLOMAX) 0.4 MG capsule Take 0.4 mg by mouth daily       vitamin D3 (CHOLECALCIFEROL) 1000 units (25 mcg) tablet Take 2,000 Units by mouth daily         Case Management:  I have reviewed the care plan and MDS and do agree with the plan. Patient's desire to return to the community is present, but is not able due to care needs . Information reviewed:  Medications, vital signs, orders, and nursing notes.    ROS:  10 point ROS of systems including Constitutional, Eyes, Respiratory, Cardiovascular, Gastroenterology, Genitourinary, Integumentary, Musculoskeletal, Psychiatric were all negative except for pertinent positives noted in my HPI.    Vitals:  /72   Pulse 59   Temp 98.8  F (37.1  C)   Resp 15   Ht 1.88 m (6' 2\")   Wt 85.6 kg (188 lb 12.8 oz)   SpO2 93%   BMI 24.24 kg/m     Body mass index is 24.24 kg/m .  Exam:  GENERAL APPEARANCE:  Alert, in no distress, pleasant, happy  RESP:  respiratory effort " "and palpation of chest normal, auscultation of lungs clear , no respiratory distress  CV:  Palpation and auscultation of heart done , rate and rhythm regular, no murmur, no  peripheral edema  ABDOMEN:  normal bowel sounds, soft, nontender, no hepatosplenomegaly or other masses  M/S:   Gait and station with W/C for mobility, has right-sided weakness, Digits and nails with arthritic changes, reduced muscle mass  SKIN:  Inspection and Palpation of skin and subcutaneous tissue with much improved dermatitis (resolved on back) with still some dryness and flakey patches on LEs. PVD changes also present  PSYCH:  insight and judgement, memory seemingly intact, affect and mood normal,  follows commands readily         Lab/Diagnostic data:   Labs done in SNF are in [place] EPIC. Please refer to them using [employer]/Care Everywhere. and Recent labs in EPIC reviewed by me today.     ASSESSMENT/PLAN  Spastic hemiplegic cerebral palsy (H)  Stable  Increased care needs with support from nursing d/t right-sided weakness; patient requires assistance with ADLs from nursing    Abdominal aortic aneurysm (AAA) without rupture (H)  F/u with [facility] for surveillance monitoring  Monitor for sudden change in condition which may require eval of AAA in ED    Essential hypertension  Coronary atherosclerosis due to lipid rich plaque  S/P carotid endarterectomy  Last BMP showing BUN 27, Creat 1.01, GFR 67  Patient reports drinking 64 oz of water/day  He endorses what seems like \"hien horses\" in his LLE which he reports improves when he stands up - this could be related to his Lasix if he is not drinking enough.  BP goals are <150/90 mm Hg.This is higher than ACC and AHA recommendations due to risk for hypotension, risk of dizziness and falls, risk of tissue/cerebral hypoperfusion and frailty. Noted patients BP is lower than goal and will adjust plan of care by reducing Lasix and monitoring.  Will order BMP for monitoring of fluid " status and electrolytes.     Atopic dermatitis, unspecified type  Improving.  Kenalog cream to remain at this time but will discontinue as soon as possible to avoid permanent atrophy.       Orders written by provider at facility and transcribed by : Magaly Polo MA  1.  Decrease Lasix to 10 mg po every day.  Dx: HTN  2.  BMP recheck Friday, 5/10/19.  Dx: HTN        Electronically signed by:  ROCÍO Larsen CNP              Sincerely,        ROCÍO Larsen CNP

## 2019-05-10 ENCOUNTER — HOSPITAL LABORATORY (OUTPATIENT)
Dept: NURSING HOME | Facility: OTHER | Age: 84
End: 2019-05-10

## 2019-05-10 LAB
ANION GAP SERPL CALCULATED.3IONS-SCNC: 5 MMOL/L (ref 3–14)
BUN SERPL-MCNC: 31 MG/DL (ref 7–30)
CALCIUM SERPL-MCNC: 9.1 MG/DL (ref 8.5–10.1)
CHLORIDE SERPL-SCNC: 105 MMOL/L (ref 94–109)
CO2 SERPL-SCNC: 32 MMOL/L (ref 20–32)
CREAT SERPL-MCNC: 1.06 MG/DL (ref 0.66–1.25)
GFR SERPL CREATININE-BSD FRML MDRD: 63 ML/MIN/{1.73_M2}
GLUCOSE SERPL-MCNC: 78 MG/DL (ref 70–99)
POTASSIUM SERPL-SCNC: 4.1 MMOL/L (ref 3.4–5.3)
SODIUM SERPL-SCNC: 142 MMOL/L (ref 133–144)

## 2019-06-06 ASSESSMENT — MIFFLIN-ST. JEOR: SCORE: 1624.29

## 2019-06-11 ENCOUNTER — HOSPITAL LABORATORY (OUTPATIENT)
Dept: NURSING HOME | Facility: OTHER | Age: 84
End: 2019-06-11

## 2019-06-11 VITALS
HEART RATE: 71 BPM | BODY MASS INDEX: 24.74 KG/M2 | OXYGEN SATURATION: 93 % | DIASTOLIC BLOOD PRESSURE: 58 MMHG | TEMPERATURE: 97.2 F | HEIGHT: 74 IN | WEIGHT: 192.8 LBS | RESPIRATION RATE: 16 BRPM | SYSTOLIC BLOOD PRESSURE: 99 MMHG

## 2019-06-11 RX ORDER — TRIAMCINOLONE ACETONIDE 0.15 MG/G
AEROSOL, SPRAY TOPICAL 2 TIMES DAILY PRN
COMMUNITY
End: 2019-01-01

## 2019-06-11 NOTE — PROGRESS NOTES
[facility]    [place] Medical Record Number:  [id number]  Place of Service where encounter took place:  [facility] [[id number]]    HPI:    Diallo Villarreal is a 86 year old  (8/31/1932), who is being seen today for a federally mandated E/M visit.  HPI information obtained from: facility staff, patient report and [facility] chart review    Today's concerns are:  - GNP reports Resident   -  - Resident seen and examined to be on plavix for one year at least until July 2019, on ASA 81 mg indefinitely due AAA pe Cardio/vascular. For this reason, suprapubic is postponed; unitl after July this years, in the main time he has meatus erosion with tx for UTI a few times since the Writer last visit.     - Reports doing fine.  Denies penile pain.   - Denies CP, edema, SOB..   - Reports sleep, appetite and BM are fine.   - RN has no concern today.   - GNP has no concern  --------------------------------  - - Past Medical, social, family histories, medications, and allergies reviewed and updated  - Medications reviewed: in the chart and EHR.   - Case Management:   I have reviewed the care plan and MDS and do agree with the plan. Patient's desire to return to the community is not present.  Information reviewed:  Medications, vital signs, orders, and nursing notes.    MEDICATIONS:  Current Outpatient Medications   Medication Sig Dispense Refill     albuterol (PROVENTIL) (2.5 MG/3ML) 0.083% neb solution Take 2.5 mg by nebulization every 4 hours as needed for shortness of breath / dyspnea or wheezing       aspirin 81 MG EC tablet Take 81 mg by mouth daily        atorvastatin (LIPITOR) 40 MG tablet Take 40 mg by mouth daily       cinacalcet (SENSIPAR) 30 MG tablet Take 30 mg by mouth daily       clopidogrel (PLAVIX) 75 MG tablet Take 75 mg by mouth daily       clotrimazole (LOTRIMIN) 1 % external cream Apply topically 2 times daily       diphenhydrAMINE-zinc acetate (BENADRYL) 1-0.1 %  "external cream Apply topically 2 times daily as needed for itching       doxazosin (CARDURA) 1 MG tablet Take 0.5 mg by mouth daily       ferrous sulfate (FEROSUL) 325 (65 Fe) MG tablet Take 325 mg by mouth daily (with breakfast)       furosemide (LASIX) 20 MG tablet Take 10 mg by mouth daily        metoprolol tartrate (LOPRESSOR) 50 MG tablet Take 50 mg by mouth 2 times daily       nitroGLYcerin (NITROSTAT) 0.4 MG sublingual tablet Place 0.4 mg under the tongue every 5 minutes as needed for chest pain For chest pain place 1 tablet under the tongue every 5 minutes for 3 doses. If symptoms persist 5 minutes after 1st dose call 911.       NUTRITIONAL SUPPLEMENT LIQD Take 8 oz by mouth 2 times daily        pantoprazole (PROTONIX) 40 MG EC tablet Take 40 mg by mouth daily       tamsulosin (FLOMAX) 0.4 MG capsule Take 0.4 mg by mouth daily       triamcinolone (KENALOG) 0.147 MG/GM external aerosol Apply topically 2 times daily       vitamin D3 (CHOLECALCIFEROL) 1000 units (25 mcg) tablet Take 2,000 Units by mouth daily       ROS: 4 point ROS including Respiratory, CV, GI and , other than that noted in the HPI,  is negative    Exam:  Vitals: BP 99/58   Pulse 71   Temp 97.2  F (36.2  C)   Resp 16   Ht 1.88 m (6' 2\")   Wt 87.5 kg (192 lb 12.8 oz)   SpO2 93%   BMI 24.75 kg/m    BMI= Body mass index is 24.75 kg/m .   GENERAL APPEARANCE:  in no distress, cooperative  RESP:  lungs clear to auscultation, no wheezing  CV:  S1S2 audible, irregular HR, no murmur appreciated.   ABDOMEN:  soft, NT/ND, BS audible. no mass appreciated on palpation.   M/S:   Kyphosis,   SKIN:  No rash.   NEURO:   Strength 4/5 right lower extremity and left upper extremity, 5/5 on the left side  PSYCH:  Fair insight, judgement and memory, affect and mood normal       Lab/Diagnostic data: Reviewed with patient in office    =========================================================  ASSESSMENT/PLAN  Spastic hemiplegic cerebral palsy (H)  Hx of " Concussion without loss of consciousness  Frailty  - chronic right sided weakness, continued to need nursing and medical care, hence now an LTC Resident.   - Significant  Deficits requiring NH placement. Requiring extensive assistance from nursing. Up for meals only o/w spends the day resting in bed  - continue supportive care.        Coronary atherosclerosis due to lipid rich plaque  S/P carotid endarterectomy  AAA w/o rupture: 6.7 cm  - at baseline. Continue to follow on Cardio/VS's recommendations.      Obstruction, uropathy  Chronic Indwelling Catheter:  - on Cardura and flomax.  Suprapubic catheter once off plavix.      Order:  - See above, otherwise, continue the rest of the current POC.       Electronically signed by:  Ramírez Ibrahim MD

## 2019-06-13 ENCOUNTER — NURSING HOME VISIT (OUTPATIENT)
Dept: GERIATRICS | Facility: CLINIC | Age: 84
End: 2019-06-13
Payer: COMMERCIAL

## 2019-06-13 DIAGNOSIS — G80.2 SPASTIC HEMIPLEGIC CEREBRAL PALSY (H): Primary | ICD-10-CM

## 2019-06-13 DIAGNOSIS — I71.40 ABDOMINAL AORTIC ANEURYSM (AAA) WITHOUT RUPTURE (H): ICD-10-CM

## 2019-06-13 DIAGNOSIS — Z97.8 CHRONIC INDWELLING FOLEY CATHETER: ICD-10-CM

## 2019-06-13 DIAGNOSIS — N13.9 OBSTRUCTION, UROPATHY: ICD-10-CM

## 2019-06-13 DIAGNOSIS — I25.83 CORONARY ATHEROSCLEROSIS DUE TO LIPID RICH PLAQUE: ICD-10-CM

## 2019-06-13 DIAGNOSIS — Z98.890 S/P CAROTID ENDARTERECTOMY: ICD-10-CM

## 2019-06-13 DIAGNOSIS — R54 FRAIL ELDERLY: ICD-10-CM

## 2019-06-13 PROCEDURE — 99309 SBSQ NF CARE MODERATE MDM 30: CPT | Performed by: FAMILY MEDICINE

## 2019-06-13 NOTE — LETTER
6/13/2019        RE: Diallo Villarreal  [address]  [address]  [place] [address]        [facility]    [facility] Record Number:  [id number]  Place of Service where encounter took place:  [facility] [[id number]]    HPI:    Diallo Villarreal is a 86 year old  (8/31/1932), who is being seen today for a federally mandated E/M visit.  HPI information obtained from: facility staff, patient report and [facility] chart review    Today's concerns are:  - GNP reports Resident   -  - Resident seen and examined to be on plavix for one year at least until July 2019, on ASA 81 mg indefinitely due AAA pe Cardio/vascular. For this reason, suprapubic is postponed; unitl after July this years, in the main time he has meatus erosion with tx for UTI a few times since the Writer last visit.     - Reports doing fine.  Denies penile pain.   - Denies CP, edema, SOB..   - Reports sleep, appetite and BM are fine.   - RN has no concern today.   - GNP has no concern  --------------------------------  - - Past Medical, social, family histories, medications, and allergies reviewed and updated  - Medications reviewed: in the chart and EHR.   - Case Management:   I have reviewed the care plan and MDS and do agree with the plan. Patient's desire to return to the community is not present.  Information reviewed:  Medications, vital signs, orders, and nursing notes.    MEDICATIONS:  Current Outpatient Medications   Medication Sig Dispense Refill     albuterol (PROVENTIL) (2.5 MG/3ML) 0.083% neb solution Take 2.5 mg by nebulization every 4 hours as needed for shortness of breath / dyspnea or wheezing       aspirin 81 MG EC tablet Take 81 mg by mouth daily        atorvastatin (LIPITOR) 40 MG tablet Take 40 mg by mouth daily       cinacalcet (SENSIPAR) 30 MG tablet Take 30 mg by mouth daily       clopidogrel (PLAVIX) 75 MG tablet Take 75 mg by mouth daily       clotrimazole (LOTRIMIN) 1 %  "external cream Apply topically 2 times daily       diphenhydrAMINE-zinc acetate (BENADRYL) 1-0.1 % external cream Apply topically 2 times daily as needed for itching       doxazosin (CARDURA) 1 MG tablet Take 0.5 mg by mouth daily       ferrous sulfate (FEROSUL) 325 (65 Fe) MG tablet Take 325 mg by mouth daily (with breakfast)       furosemide (LASIX) 20 MG tablet Take 10 mg by mouth daily        metoprolol tartrate (LOPRESSOR) 50 MG tablet Take 50 mg by mouth 2 times daily       nitroGLYcerin (NITROSTAT) 0.4 MG sublingual tablet Place 0.4 mg under the tongue every 5 minutes as needed for chest pain For chest pain place 1 tablet under the tongue every 5 minutes for 3 doses. If symptoms persist 5 minutes after 1st dose call 911.       NUTRITIONAL SUPPLEMENT LIQD Take 8 oz by mouth 2 times daily        pantoprazole (PROTONIX) 40 MG EC tablet Take 40 mg by mouth daily       tamsulosin (FLOMAX) 0.4 MG capsule Take 0.4 mg by mouth daily       triamcinolone (KENALOG) 0.147 MG/GM external aerosol Apply topically 2 times daily       vitamin D3 (CHOLECALCIFEROL) 1000 units (25 mcg) tablet Take 2,000 Units by mouth daily       ROS: 4 point ROS including Respiratory, CV, GI and , other than that noted in the HPI,  is negative    Exam:  Vitals: BP 99/58   Pulse 71   Temp 97.2  F (36.2  C)   Resp 16   Ht 1.88 m (6' 2\")   Wt 87.5 kg (192 lb 12.8 oz)   SpO2 93%   BMI 24.75 kg/m     BMI= Body mass index is 24.75 kg/m .   GENERAL APPEARANCE:  in no distress, cooperative  RESP:  lungs clear to auscultation, no wheezing  CV:  S1S2 audible, irregular HR, no murmur appreciated.   ABDOMEN:  soft, NT/ND, BS audible. no mass appreciated on palpation.   M/S:   Kyphosis,   SKIN:  No rash.   NEURO:   Strength 4/5 right lower extremity and left upper extremity, 5/5 on the left side  PSYCH:  Fair insight, judgement and memory, affect and mood normal       Lab/Diagnostic data: Reviewed with patient in " office    =========================================================  ASSESSMENT/PLAN  Spastic hemiplegic cerebral palsy (H)  Hx of Concussion without loss of consciousness  Frailty  - chronic right sided weakness, continued to need nursing and medical care, hence now an LTC Resident.   - Significant  Deficits requiring NH placement. Requiring extensive assistance from nursing. Up for meals only o/w spends the day resting in bed  - continue supportive care.        Coronary atherosclerosis due to lipid rich plaque  S/P carotid endarterectomy  AAA w/o rupture: 6.7 cm  - at baseline. Continue to follow on Cardio/VS's recommendations.      Obstruction, uropathy  Chronic Indwelling Catheter:  - on Cardura and flomax.  Suprapubic catheter once off plavix.      Order:  - See above, otherwise, continue the rest of the current POC.       Electronically signed by:  Ramírez Ibrahim MD        Sincerely,        Ramírez Ibrahim MD

## 2019-06-14 LAB
BACTERIA SPEC CULT: ABNORMAL
Lab: ABNORMAL
SPECIMEN SOURCE: ABNORMAL

## 2019-06-24 ENCOUNTER — HOSPITAL LABORATORY (OUTPATIENT)
Dept: NURSING HOME | Facility: OTHER | Age: 84
End: 2019-06-24

## 2019-06-24 LAB
ALBUMIN UR-MCNC: 100 MG/DL
APPEARANCE UR: ABNORMAL
BACTERIA #/AREA URNS HPF: ABNORMAL /HPF
BILIRUB UR QL STRIP: NEGATIVE
COLOR UR AUTO: ABNORMAL
GLUCOSE UR STRIP-MCNC: NEGATIVE MG/DL
HGB UR QL STRIP: ABNORMAL
KETONES UR STRIP-MCNC: NEGATIVE MG/DL
LEUKOCYTE ESTERASE UR QL STRIP: ABNORMAL
NITRATE UR QL: NEGATIVE
PH UR STRIP: 8 PH (ref 5–7)
RBC #/AREA URNS AUTO: >182 /HPF (ref 0–2)
SOURCE: ABNORMAL
SP GR UR STRIP: 1.01 (ref 1–1.03)
UROBILINOGEN UR STRIP-MCNC: 0 MG/DL (ref 0–2)
WBC #/AREA URNS AUTO: 77 /HPF (ref 0–5)

## 2019-06-27 LAB
BACTERIA SPEC CULT: ABNORMAL
BACTERIA SPEC CULT: ABNORMAL
Lab: ABNORMAL
SPECIMEN SOURCE: ABNORMAL

## 2019-07-19 ENCOUNTER — TELEPHONE (OUTPATIENT)
Dept: GERIATRICS | Facility: CLINIC | Age: 84
End: 2019-07-19

## 2019-07-19 NOTE — TELEPHONE ENCOUNTER
Prior Authorization Retail Medication Request    Medication/Dose: Sensipar 30 mg every day.  ICD code (if different than what is on RX):  E83.52 : Hypercalcemia  Previously Tried and Failed:  Calcitonin, Denosumab , Zoledronic acid  Rationale:  This helps maintain the patients condition    Insurance Name:  unknown  Insurance ID:  unknown      Pharmacy Information (if different than what is on RX)  Name:  [facility]  Phone:  [phone]

## 2019-07-19 NOTE — PROGRESS NOTES
PRE-OP EVALUATION:    [facility] Medical Record Number:  [id number]  Place of Service where encounter took place:  [facility] [[id number][facility]  Today's date: 2019    [facility]  [address]  [facility] [address]  [phone]  Dept: [phone]    PRE-OP EVALUATION:  Today's date: 2019    Diallo Villarreal (: 1932) presents for pre-operative evaluation assessment as requested by Dr. Manuel Washington.  He requires evaluation and anesthesia risk assessment prior to undergoing surgery/procedure for treatment of Obstructive Uropathy with urinary retention, chronic indwelling catheter .    Proposed Surgery/ Procedure: Cystoscopy, Litholapaxy with Holmium Laser  Date of Surgery/ Procedure: 19  Time of Surgery/ Procedure: 1:30 pm  Hospital/Surgical Facility: [facility]  Fax number for surgical facility: [phone]  Primary Physician: Jennifer Bryant  Type of Anesthesia Anticipated: Choice    Patient has a Health Care Directive or Living Will:  YES on file at [facility]    1. YES - DO YOU HAVE A HISTORY OF HEART ATTACK, STROKE, STENT, BYPASS OR SURGERY ON AN ARTERY IN THE HEAD, NECK, HEART OR LEG? S/p carotid endarterectomy, MICK placement in 10/2018  2. NO - Do you ever have any pain or discomfort in your chest?  3. NO - Do you have a history of  Heart Failure?  4. NO - Are you troubled by shortness of breath when: walking on the level, up a slight hill or at night?  5. NO - Do you currently have a cold, bronchitis or other respiratory infection?  6. NO - Do you have a cough, shortness of breath or wheezing?  7. NO - Do you sometimes get pains in the calves of your legs when you walk?  8. NO - Do you or anyone in your family have previous history of blood clots?  9. NO - Do you or does anyone in your family have a serious bleeding problem such as prolonged bleeding following surgeries or cuts?  10. YES - HAVE YOU EVER HAD PROBLEMS WITH  ANEMIA OR BEEN TOLD TO TAKE IRON PILLS? Takes Iron pills for anemia  11. NO - Have you had any abnormal blood loss such as black, tarry or bloody stools, or abnormal vaginal bleeding?  12. YES - HAVE YOU EVER HAD A BLOOD TRANSFUSION? At [facility], 10/2018  13. NO - Have you or any of your relatives ever had problems with anesthesia?  14. NO - Do you have sleep apnea, excessive snoring or daytime drowsiness?  15. NO - Do you have any prosthetic heart valves?  16. NO - Do you have prosthetic joints?  17. NO - Is there any chance that you may be pregnant?      HPI:     HPI related to upcoming procedure:   Patient with urinary retnetion, obstructive uropathy with chronic indwelling catheter and recurrent UTIs, hematuria, trauma to penis. Presents for cystoscopy and SPC procedure.       ANEMIA - Patient has a recent history of moderate-severe anemia, which has not been symptomatic. Work up to date has revealed baseline Hgb ~7.5-8.0. Treatment has been Fe Sulfate 325 mg daily started 7/22/19.      CAD - Patient has a longstanding history of moderate-severe CAD. Patient denies recent chest pain or NTG use, denies exercise induced dyspnea or PND. Last Stress test 8/17/18  Stress test:  Findings    The overall quality of this study is good.    Attenuation artifact was not present, but CT attenuation correction was   applied.    Left ventricular cavity appears to be normal in size.     No abnormal extracardiac activity noted.     Right ventricle is normal in size.    Stress SPECT images reveal a small sized reversible perfusion defect of   mild intensity in the anterior and apical wall segment consistent with   ischemia. This fits with ischemia in the LAD coronary artery territory.    Rest SPECT images, however, reveal normal homogeneous tracer uptake   throughout the myocardium.    Gated stress SPECT imaging shows normal wall thickening, normal wall   motion. Left ventricular ejection fraction is 69%.     EKG 7/22/19  showing sinus rhythm with RBB consistent with 3/4/19 EKG, new PVCs.     HYPERTENSION - Patient has longstanding history of HTN , currently denies any symptoms referable to elevated blood pressure. Specifically denies chest pain, palpitations, dyspnea, orthopnea, PND or peripheral edema. Blood pressure readings have been in normal range. Current medication regimen is as listed below. Patient denies any side effects of medication.     RENAL INSUFFICIENCY - Patient has a longstanding history of mild to moderate chronic renal insufficiency. Last Cr 1.00.       MEDICAL HISTORY:     Patient Active Problem List    Diagnosis Date Noted     Candidiasis of skin 03/17/2019     Priority: Medium     Decubitus ulcer of coccyx, stage III (H) 03/17/2019     Priority: Medium     Normocytic anemia 03/15/2019     Priority: Medium     Obstruction, uropathy 03/15/2019     Priority: Medium     Retention of urine 03/12/2019     Priority: Medium     Cerebral palsy (H) 03/02/2019     Priority: Medium     Concussion 03/02/2019     Priority: Medium     Fall 03/02/2019     Priority: Medium     Abdominal aortic aneurysm (AAA) without rupture (H) 10/16/2018     Priority: Medium     Coronary atherosclerosis 09/06/2018     Priority: Medium     S/P carotid endarterectomy 11/20/2014     Priority: Medium     Celiac sprue 06/29/2013     Priority: Medium     Cellulitis and abscess of leg 06/29/2013     Priority: Medium     Essential hypertension 06/29/2013     Priority: Medium      Past Medical History:   Diagnosis Date     AAA (abdominal aortic aneurysm) (H)      Celiac sprue      HTN (hypertension)      Past Surgical History:   Procedure Laterality Date     LEFT CAROTID ENDARTERECTOMY       TONSILLECTOMY       Current Outpatient Medications   Medication Sig Dispense Refill     albuterol (PROVENTIL) (2.5 MG/3ML) 0.083% neb solution Take 2.5 mg by nebulization every 4 hours as needed for shortness of breath / dyspnea or wheezing       aspirin 81 MG EC  tablet Take 81 mg by mouth daily        atorvastatin (LIPITOR) 40 MG tablet Take 40 mg by mouth daily       BUTT PASTE - REGULAR (DR LOVE POMAXWELL GOOP BUTT PASTE FORMULA) Apply topically every 2 hours as needed for skin protection       cinacalcet (SENSIPAR) 30 MG tablet Take 30 mg by mouth daily       clotrimazole (LOTRIMIN) 1 % external cream Apply topically 2 times daily       diphenhydrAMINE-zinc acetate (BENADRYL) 1-0.1 % external cream Apply topically 2 times daily as needed for itching       doxazosin (CARDURA) 1 MG tablet Take 0.5 mg by mouth daily       ferrous sulfate (FEROSUL) 325 (65 Fe) MG tablet Take 325 mg by mouth daily (with breakfast)       furosemide (LASIX) 20 MG tablet Take 10 mg by mouth daily        metoprolol tartrate (LOPRESSOR) 50 MG tablet Take 50 mg by mouth 2 times daily       nitroGLYcerin (NITROSTAT) 0.4 MG sublingual tablet Place 0.4 mg under the tongue every 5 minutes as needed for chest pain For chest pain place 1 tablet under the tongue every 5 minutes for 3 doses. If symptoms persist 5 minutes after 1st dose call 911.       NUTRITIONAL SUPPLEMENT LIQD Take 8 oz by mouth 2 times daily        pantoprazole (PROTONIX) 40 MG EC tablet Take 40 mg by mouth daily       tamsulosin (FLOMAX) 0.4 MG capsule Take 0.4 mg by mouth daily       triamcinolone (KENALOG) 0.147 MG/GM external aerosol Apply topically 2 times daily as needed        vitamin D3 (CHOLECALCIFEROL) 1000 units (25 mcg) tablet Take 2,000 Units by mouth daily       clopidogrel (PLAVIX) 75 MG tablet Take 75 mg by mouth daily       OTC products: Aspirin    Allergies   Allergen Reactions     Gluten Meal Other (See Comments)     Celiac disease     Wheat Extract Other (See Comments)      Latex Allergy: NO    Social History     Tobacco Use     Smoking status: Former Smoker     Smokeless tobacco: Never Used   Substance Use Topics     Alcohol use: No     Frequency: Never     History   Drug Use Not on file       REVIEW OF SYSTEMS:  "  Limited secondary to cognitive impairment but today pt reports 10 point ROS of systems including Constitutional, Eyes, Respiratory, Cardiovascular, Gastroenterology, Genitourinary, Integumentary, Musculoskeletal, Psychiatric were all negative except for pertinent positives noted in my HPI.    EXAM:   /56   Pulse 73   Temp 97.3  F (36.3  C)   Resp 18   Ht 1.88 m (6' 2\")   Wt 85 kg (187 lb 6.4 oz)   SpO2 93%   BMI 24.06 kg/m    GENERAL APPEARANCE:  Alert, in no distress  RESP:  respiratory effort and palpation of chest normal, auscultation of lungs clear , no respiratory distress  CV:  Palpation and auscultation of heart done , rate and rhythm regular with ectopy noted, no murmur, no LE peripheral edema  ABDOMEN:  normal bowel sounds, soft, nontender, no hepatosplenomegaly or other masses  M/S:   Gait and station with W/C for mobility, Digits and nails with arthritic changes, reduced muscle mass, right hemiparesis   SKIN:  Inspection and Palpation of skin and subcutaneous tissue pale, intact  PSYCH:  insight and judgement, memory with mild impairment, affect and mood normal, follows commands readily       DIAGNOSTICS:     EK19, NSR with low QRS voltage, Right Bundle Branch Block, occasional PVC noted, unifocal, ventricular extrasystole, abnormal repolarization   Labs Resulted Today:   Results for orders placed or performed in visit on 19   Basic metabolic panel   Result Value Ref Range    Sodium 140 133 - 144 mmol/L    Potassium 3.7 3.4 - 5.3 mmol/L    Chloride 106 94 - 109 mmol/L    Carbon Dioxide 28 20 - 32 mmol/L    Anion Gap 6 3 - 14 mmol/L    Glucose 75 70 - 99 mg/dL    Urea Nitrogen 32 (H) 7 - 30 mg/dL    Creatinine 1.00 0.66 - 1.25 mg/dL    GFR Estimate 67 >60 mL/min/[1.73_m2]    GFR Estimate If Black 78 >60 mL/min/[1.73_m2]    Calcium 9.0 8.5 - 10.1 mg/dL   CBC with platelets differential   Result Value Ref Range    WBC 9.4 4.0 - 11.0 10e9/L    RBC Count 2.94 (L) 4.4 - 5.9 10e12/L "    Hemoglobin 7.6 (L) 13.3 - 17.7 g/dL    Hematocrit 24.8 (L) 40.0 - 53.0 %    MCV 84 78 - 100 fl    MCH 25.9 (L) 26.5 - 33.0 pg    MCHC 30.6 (L) 31.5 - 36.5 g/dL    RDW 18.2 (H) 10.0 - 15.0 %    Platelet Count 195 150 - 450 10e9/L    Diff Method Automated Method     % Neutrophils 67.6 %    % Lymphocytes 15.0 %    % Monocytes 10.6 %    % Eosinophils 6.2 %    % Basophils 0.1 %    % Immature Granulocytes 0.5 %    Nucleated RBCs 0 0 /100    Absolute Neutrophil 6.4 1.6 - 8.3 10e9/L    Absolute Lymphocytes 1.4 0.8 - 5.3 10e9/L    Absolute Monocytes 1.0 0.0 - 1.3 10e9/L    Absolute Basophils 0.0 0.0 - 0.2 10e9/L    Abs Immature Granulocytes 0.1 0 - 0.4 10e9/L    Absolute Nucleated RBC 0.0        Recent Labs   Lab Test 05/10/19  0736 04/30/19  1050 03/15/19  0719   HGB  --  9.4* 7.5*   PLT  --  237 261    143 145*   POTASSIUM 4.1 3.6 3.5   CR 1.06 1.01 1.05        IMPRESSION:   Reason for surgery/procedure: Obstructive uropathy with chronic indwelling catheter with recurrent UTIs and hematuria with clots.   Diagnosis/reason for consult: SPC placement    The proposed surgical procedure is considered INTERMEDIATE risk.    REVISED CARDIAC RISK INDEX  The patient has the following serious cardiovascular risks for perioperative complications such as (MI, PE, VFib and 3  AV Block):  Coronary Artery Disease (MI, positive stress test, angina, Qs on EKG)  INTERPRETATION: 0 risks: Class I (very low risk - 0.4% complication rate)    The patient has the following additional risks for perioperative complications:  No identified additional risks  Anemia with Hgb last 7.6      ICD-10-CM    1. Chronic indwelling Armendariz catheter Z96.0    2. Obstruction, uropathy N13.9    3. Retention of urine R33.9        ORDERS:       Cardiovascular Risk  Patient is already on a Beta Blocker. Continue Betablocker therapy after surgery, using Beta blocker order set as necessary for NPO status.      Anemia  Anemia and does not require treatment prior  to surgery.  Monitor Hemoglobin postoperatively.      --Patient is to take all scheduled medications on the day of surgery EXCEPT for modifications listed below.    Anticoagulant or Antiplatelet Medication Use  ASPIRIN: Patient is at increased risk of thrombosis (e.g. MI, CVA) and aspirin 81 mg daily should be continued in the perioperative period  PLAVIX: Surgeon asked for Plavix to be stopped 7 days prior to surgery.         APPROVAL GIVEN to proceed with proposed procedure, without further diagnostic evaluation       Signed Electronically by: ROCÍO Larsen CNP    Copy of this evaluation report is provided to requesting physician.    [facility] Preop Guidelines    Revised Cardiac Risk Index

## 2019-07-22 ENCOUNTER — HOSPITAL LABORATORY (OUTPATIENT)
Dept: NURSING HOME | Facility: OTHER | Age: 84
End: 2019-07-22

## 2019-07-22 LAB
ANION GAP SERPL CALCULATED.3IONS-SCNC: 6 MMOL/L (ref 3–14)
BASOPHILS # BLD AUTO: 0 10E9/L (ref 0–0.2)
BASOPHILS NFR BLD AUTO: 0.1 %
BUN SERPL-MCNC: 32 MG/DL (ref 7–30)
CALCIUM SERPL-MCNC: 9 MG/DL (ref 8.5–10.1)
CHLORIDE SERPL-SCNC: 106 MMOL/L (ref 94–109)
CO2 SERPL-SCNC: 28 MMOL/L (ref 20–32)
CREAT SERPL-MCNC: 1 MG/DL (ref 0.66–1.25)
DIFFERENTIAL METHOD BLD: ABNORMAL
EOSINOPHIL NFR BLD AUTO: 6.2 %
ERYTHROCYTE [DISTWIDTH] IN BLOOD BY AUTOMATED COUNT: 18.2 % (ref 10–15)
GFR SERPL CREATININE-BSD FRML MDRD: 67 ML/MIN/{1.73_M2}
GLUCOSE SERPL-MCNC: 75 MG/DL (ref 70–99)
HCT VFR BLD AUTO: 24.8 % (ref 40–53)
HGB BLD-MCNC: 7.6 G/DL (ref 13.3–17.7)
IMM GRANULOCYTES # BLD: 0.1 10E9/L (ref 0–0.4)
IMM GRANULOCYTES NFR BLD: 0.5 %
LYMPHOCYTES # BLD AUTO: 1.4 10E9/L (ref 0.8–5.3)
LYMPHOCYTES NFR BLD AUTO: 15 %
MCH RBC QN AUTO: 25.9 PG (ref 26.5–33)
MCHC RBC AUTO-ENTMCNC: 30.6 G/DL (ref 31.5–36.5)
MCV RBC AUTO: 84 FL (ref 78–100)
MONOCYTES # BLD AUTO: 1 10E9/L (ref 0–1.3)
MONOCYTES NFR BLD AUTO: 10.6 %
NEUTROPHILS # BLD AUTO: 6.4 10E9/L (ref 1.6–8.3)
NEUTROPHILS NFR BLD AUTO: 67.6 %
NRBC # BLD AUTO: 0 10*3/UL
NRBC BLD AUTO-RTO: 0 /100
PLATELET # BLD AUTO: 195 10E9/L (ref 150–450)
POTASSIUM SERPL-SCNC: 3.7 MMOL/L (ref 3.4–5.3)
RBC # BLD AUTO: 2.94 10E12/L (ref 4.4–5.9)
SODIUM SERPL-SCNC: 140 MMOL/L (ref 133–144)
WBC # BLD AUTO: 9.4 10E9/L (ref 4–11)

## 2019-07-23 ENCOUNTER — NURSING HOME VISIT (OUTPATIENT)
Dept: GERIATRICS | Facility: CLINIC | Age: 84
End: 2019-07-23
Payer: COMMERCIAL

## 2019-07-23 VITALS
HEIGHT: 74 IN | RESPIRATION RATE: 18 BRPM | OXYGEN SATURATION: 93 % | TEMPERATURE: 97.3 F | BODY MASS INDEX: 24.05 KG/M2 | DIASTOLIC BLOOD PRESSURE: 56 MMHG | HEART RATE: 73 BPM | SYSTOLIC BLOOD PRESSURE: 108 MMHG | WEIGHT: 187.4 LBS

## 2019-07-23 DIAGNOSIS — Z97.8 CHRONIC INDWELLING FOLEY CATHETER: Primary | ICD-10-CM

## 2019-07-23 DIAGNOSIS — R33.9 RETENTION OF URINE: ICD-10-CM

## 2019-07-23 DIAGNOSIS — N13.9 OBSTRUCTION, UROPATHY: ICD-10-CM

## 2019-07-23 PROCEDURE — 99310 SBSQ NF CARE HIGH MDM 45: CPT | Performed by: NURSE PRACTITIONER

## 2019-07-23 ASSESSMENT — MIFFLIN-ST. JEOR: SCORE: 1599.79

## 2019-07-23 NOTE — LETTER
2019        RE: Diallo Villarreal  [address]  [address]  [place] [address]        PRE-OP EVALUATION:    [facility] Medical Record Number:  [id number]  Place of Service where encounter took place:  [facility] [[id number]]  Today's date: 2019    [facility]  [address]  [place] [id number] [phone]  Dept: [phone]    PRE-OP EVALUATION:  Today's date: 2019    Diallo Villarreal (: 1932) presents for pre-operative evaluation assessment as requested by Dr. Manuel Washington.  He requires evaluation and anesthesia risk assessment prior to undergoing surgery/procedure for treatment of Obstructive Uropathy with urinary retention, chronic indwelling catheter .    Proposed Surgery/ Procedure: Cystoscopy, Litholapaxy with Holmium Laser  Date of Surgery/ Procedure: 19  Time of Surgery/ Procedure: 1:30 pm  Hospital/Surgical Facility: [facility]  Fax number for surgical facility: [phone]  Primary Physician: Jennifer Bryant  Type of Anesthesia Anticipated: Choice    Patient has a Health Care Directive or Living Will:  YES on file at [facility]    1. YES - DO YOU HAVE A HISTORY OF HEART ATTACK, STROKE, STENT, BYPASS OR SURGERY ON AN ARTERY IN THE HEAD, NECK, HEART OR LEG? S/p carotid endarterectomy, MICK placement in 10/2018  2. NO - Do you ever have any pain or discomfort in your chest?  3. NO - Do you have a history of  Heart Failure?  4. NO - Are you troubled by shortness of breath when: walking on the level, up a slight hill or at night?  5. NO - Do you currently have a cold, bronchitis or other respiratory infection?  6. NO - Do you have a cough, shortness of breath or wheezing?  7. NO - Do you sometimes get pains in the calves of your legs when you walk?  8. NO - Do you or anyone in your family have previous history of blood clots?  9. NO - Do you or does anyone in your family have a serious bleeding problem such  as prolonged bleeding following surgeries or cuts?  10. YES - HAVE YOU EVER HAD PROBLEMS WITH ANEMIA OR BEEN TOLD TO TAKE IRON PILLS? Takes Iron pills for anemia  11. NO - Have you had any abnormal blood loss such as black, tarry or bloody stools, or abnormal vaginal bleeding?  12. YES - HAVE YOU EVER HAD A BLOOD TRANSFUSION? At [facility], 10/2018  13. NO - Have you or any of your relatives ever had problems with anesthesia?  14. NO - Do you have sleep apnea, excessive snoring or daytime drowsiness?  15. NO - Do you have any prosthetic heart valves?  16. NO - Do you have prosthetic joints?  17. NO - Is there any chance that you may be pregnant?      HPI:     HPI related to upcoming procedure:   Patient with urinary retnetion, obstructive uropathy with chronic indwelling catheter and recurrent UTIs, hematuria, trauma to penis. Presents for cystoscopy and SPC procedure.       ANEMIA - Patient has a recent history of moderate-severe anemia, which has not been symptomatic. Work up to date has revealed baseline Hgb ~7.5-8.0. Treatment has been Fe Sulfate 325 mg daily started 7/22/19.      CAD - Patient has a longstanding history of moderate-severe CAD. Patient denies recent chest pain or NTG use, denies exercise induced dyspnea or PND. Last Stress test 8/17/18  Stress test:  Findings    The overall quality of this study is good.    Attenuation artifact was not present, but CT attenuation correction was   applied.    Left ventricular cavity appears to be normal in size.     No abnormal extracardiac activity noted.     Right ventricle is normal in size.    Stress SPECT images reveal a small sized reversible perfusion defect of   mild intensity in the anterior and apical wall segment consistent with   ischemia. This fits with ischemia in the LAD coronary artery territory.    Rest SPECT images, however, reveal normal homogeneous tracer uptake   throughout the myocardium.    Gated stress SPECT imaging shows normal wall  thickening, normal wall   motion. Left ventricular ejection fraction is 69%.     EKG 7/22/19 showing sinus rhythm with RBB consistent with 3/4/19 EKG, new PVCs.     HYPERTENSION - Patient has longstanding history of HTN , currently denies any symptoms referable to elevated blood pressure. Specifically denies chest pain, palpitations, dyspnea, orthopnea, PND or peripheral edema. Blood pressure readings have been in normal range. Current medication regimen is as listed below. Patient denies any side effects of medication.     RENAL INSUFFICIENCY - Patient has a longstanding history of mild to moderate chronic renal insufficiency. Last Cr 1.00.       MEDICAL HISTORY:     Patient Active Problem List    Diagnosis Date Noted     Candidiasis of skin 03/17/2019     Priority: Medium     Decubitus ulcer of coccyx, stage III (H) 03/17/2019     Priority: Medium     Normocytic anemia 03/15/2019     Priority: Medium     Obstruction, uropathy 03/15/2019     Priority: Medium     Retention of urine 03/12/2019     Priority: Medium     Cerebral palsy (H) 03/02/2019     Priority: Medium     Concussion 03/02/2019     Priority: Medium     Fall 03/02/2019     Priority: Medium     Abdominal aortic aneurysm (AAA) without rupture (H) 10/16/2018     Priority: Medium     Coronary atherosclerosis 09/06/2018     Priority: Medium     S/P carotid endarterectomy 11/20/2014     Priority: Medium     Celiac sprue 06/29/2013     Priority: Medium     Cellulitis and abscess of leg 06/29/2013     Priority: Medium     Essential hypertension 06/29/2013     Priority: Medium      Past Medical History:   Diagnosis Date     AAA (abdominal aortic aneurysm) (H)      Celiac sprue      HTN (hypertension)      Past Surgical History:   Procedure Laterality Date     LEFT CAROTID ENDARTERECTOMY       TONSILLECTOMY       Current Outpatient Medications   Medication Sig Dispense Refill     albuterol (PROVENTIL) (2.5 MG/3ML) 0.083% neb solution Take 2.5 mg by nebulization  every 4 hours as needed for shortness of breath / dyspnea or wheezing       aspirin 81 MG EC tablet Take 81 mg by mouth daily        atorvastatin (LIPITOR) 40 MG tablet Take 40 mg by mouth daily       BUTT PASTE - REGULAR (DR LOVE POOP GOOP BUTT PASTE FORMULA) Apply topically every 2 hours as needed for skin protection       cinacalcet (SENSIPAR) 30 MG tablet Take 30 mg by mouth daily       clotrimazole (LOTRIMIN) 1 % external cream Apply topically 2 times daily       diphenhydrAMINE-zinc acetate (BENADRYL) 1-0.1 % external cream Apply topically 2 times daily as needed for itching       doxazosin (CARDURA) 1 MG tablet Take 0.5 mg by mouth daily       ferrous sulfate (FEROSUL) 325 (65 Fe) MG tablet Take 325 mg by mouth daily (with breakfast)       furosemide (LASIX) 20 MG tablet Take 10 mg by mouth daily        metoprolol tartrate (LOPRESSOR) 50 MG tablet Take 50 mg by mouth 2 times daily       nitroGLYcerin (NITROSTAT) 0.4 MG sublingual tablet Place 0.4 mg under the tongue every 5 minutes as needed for chest pain For chest pain place 1 tablet under the tongue every 5 minutes for 3 doses. If symptoms persist 5 minutes after 1st dose call 911.       NUTRITIONAL SUPPLEMENT LIQD Take 8 oz by mouth 2 times daily        pantoprazole (PROTONIX) 40 MG EC tablet Take 40 mg by mouth daily       tamsulosin (FLOMAX) 0.4 MG capsule Take 0.4 mg by mouth daily       triamcinolone (KENALOG) 0.147 MG/GM external aerosol Apply topically 2 times daily as needed        vitamin D3 (CHOLECALCIFEROL) 1000 units (25 mcg) tablet Take 2,000 Units by mouth daily       clopidogrel (PLAVIX) 75 MG tablet Take 75 mg by mouth daily       OTC products: Aspirin    Allergies   Allergen Reactions     Gluten Meal Other (See Comments)     Celiac disease     Wheat Extract Other (See Comments)      Latex Allergy: NO    Social History     Tobacco Use     Smoking status: Former Smoker     Smokeless tobacco: Never Used   Substance Use Topics     Alcohol use:  "No     Frequency: Never     History   Drug Use Not on file       REVIEW OF SYSTEMS:   Limited secondary to cognitive impairment but today pt reports 10 point ROS of systems including Constitutional, Eyes, Respiratory, Cardiovascular, Gastroenterology, Genitourinary, Integumentary, Musculoskeletal, Psychiatric were all negative except for pertinent positives noted in my HPI.    EXAM:   /56   Pulse 73   Temp 97.3  F (36.3  C)   Resp 18   Ht 1.88 m (6' 2\")   Wt 85 kg (187 lb 6.4 oz)   SpO2 93%   BMI 24.06 kg/m     GENERAL APPEARANCE:  Alert, in no distress  RESP:  respiratory effort and palpation of chest normal, auscultation of lungs clear , no respiratory distress  CV:  Palpation and auscultation of heart done , rate and rhythm regular with ectopy noted, no murmur, no LE peripheral edema  ABDOMEN:  normal bowel sounds, soft, nontender, no hepatosplenomegaly or other masses  M/S:   Gait and station with W/C for mobility, Digits and nails with arthritic changes, reduced muscle mass, right hemiparesis   SKIN:  Inspection and Palpation of skin and subcutaneous tissue pale, intact  PSYCH:  insight and judgement, memory with mild impairment, affect and mood normal, follows commands readily       DIAGNOSTICS:     EK19, NSR with low QRS voltage, Right Bundle Branch Block, occasional PVC noted, unifocal, ventricular extrasystole, abnormal repolarization   Labs Resulted Today:   Results for orders placed or performed in visit on 19   Basic metabolic panel   Result Value Ref Range    Sodium 140 133 - 144 mmol/L    Potassium 3.7 3.4 - 5.3 mmol/L    Chloride 106 94 - 109 mmol/L    Carbon Dioxide 28 20 - 32 mmol/L    Anion Gap 6 3 - 14 mmol/L    Glucose 75 70 - 99 mg/dL    Urea Nitrogen 32 (H) 7 - 30 mg/dL    Creatinine 1.00 0.66 - 1.25 mg/dL    GFR Estimate 67 >60 mL/min/[1.73_m2]    GFR Estimate If Black 78 >60 mL/min/[1.73_m2]    Calcium 9.0 8.5 - 10.1 mg/dL   CBC with platelets differential   Result " Value Ref Range    WBC 9.4 4.0 - 11.0 10e9/L    RBC Count 2.94 (L) 4.4 - 5.9 10e12/L    Hemoglobin 7.6 (L) 13.3 - 17.7 g/dL    Hematocrit 24.8 (L) 40.0 - 53.0 %    MCV 84 78 - 100 fl    MCH 25.9 (L) 26.5 - 33.0 pg    MCHC 30.6 (L) 31.5 - 36.5 g/dL    RDW 18.2 (H) 10.0 - 15.0 %    Platelet Count 195 150 - 450 10e9/L    Diff Method Automated Method     % Neutrophils 67.6 %    % Lymphocytes 15.0 %    % Monocytes 10.6 %    % Eosinophils 6.2 %    % Basophils 0.1 %    % Immature Granulocytes 0.5 %    Nucleated RBCs 0 0 /100    Absolute Neutrophil 6.4 1.6 - 8.3 10e9/L    Absolute Lymphocytes 1.4 0.8 - 5.3 10e9/L    Absolute Monocytes 1.0 0.0 - 1.3 10e9/L    Absolute Basophils 0.0 0.0 - 0.2 10e9/L    Abs Immature Granulocytes 0.1 0 - 0.4 10e9/L    Absolute Nucleated RBC 0.0        Recent Labs   Lab Test 05/10/19  0736 04/30/19  1050 03/15/19  0719   HGB  --  9.4* 7.5*   PLT  --  237 261    143 145*   POTASSIUM 4.1 3.6 3.5   CR 1.06 1.01 1.05        IMPRESSION:   Reason for surgery/procedure: Obstructive uropathy with chronic indwelling catheter with recurrent UTIs and hematuria with clots.   Diagnosis/reason for consult: SPC placement    The proposed surgical procedure is considered INTERMEDIATE risk.    REVISED CARDIAC RISK INDEX  The patient has the following serious cardiovascular risks for perioperative complications such as (MI, PE, VFib and 3  AV Block):  Coronary Artery Disease (MI, positive stress test, angina, Qs on EKG)  INTERPRETATION: 0 risks: Class I (very low risk - 0.4% complication rate)    The patient has the following additional risks for perioperative complications:  No identified additional risks  Anemia with Hgb last 7.6      ICD-10-CM    1. Chronic indwelling Armendariz catheter Z96.0    2. Obstruction, uropathy N13.9    3. Retention of urine R33.9        ORDERS:       Cardiovascular Risk  Patient is already on a Beta Blocker. Continue Betablocker therapy after surgery, using Beta blocker order set as  necessary for NPO status.      Anemia  Anemia and does not require treatment prior to surgery.  Monitor Hemoglobin postoperatively.      --Patient is to take all scheduled medications on the day of surgery EXCEPT for modifications listed below.    Anticoagulant or Antiplatelet Medication Use  ASPIRIN: Patient is at increased risk of thrombosis (e.g. MI, CVA) and aspirin 81 mg daily should be continued in the perioperative period  PLAVIX: Surgeon asked for Plavix to be stopped 7 days prior to surgery.         APPROVAL GIVEN to proceed with proposed procedure, without further diagnostic evaluation       Signed Electronically by: ROCÍO Larsen CNP    Copy of this evaluation report is provided to requesting physician.    [facility] Preop Guidelines    Revised Cardiac Risk Index          Sincerely,        ROCÍO Larsen CNP

## 2019-07-29 NOTE — TELEPHONE ENCOUNTER
Called [facility] at [phone], but only got a busy signal was calling pharmacy as we do not have patient's most recent pharmacy insurance information. Will try again after a bit.

## 2019-07-30 NOTE — TELEPHONE ENCOUNTER
Called [facility] at [phone] and was able to get his pharmacy insurance information ([employer] [place] Medicare).     PA Initiation    Medication: Sensipar  Insurance Company: [employer] - Phone [phone] Fax [phone]  Pharmacy Filling the Rx[facility]  Filling Pharmacy Phone: [phone]  Filling Pharmacy Fax:    Start Date: 7/29/2019    Central Prior Authorization Team   Phone: [phone]

## 2019-07-31 NOTE — TELEPHONE ENCOUNTER
Called [employer] at [phone] to check status of this prior auth, per rep this is still pending and they should have a determination sent to us by August 2nd

## 2019-08-02 NOTE — TELEPHONE ENCOUNTER
PRIOR AUTHORIZATION DENIED    Medication: Sensipar    Denial Date: 8/2/2019    Denial Rational: PA is denied due to patient not having an FDA approved indication for this medication. And for dx of primary hyperparathyroidism MD must state that pt is not able to have a parathyroidectomy        Appeal Information:

## 2019-08-08 NOTE — PROGRESS NOTES
[facility]  Chief Complaint   Patient presents with     alf Regulatory     [facility] Medical Record Number:  [id number]  Place of Service where encounter took place[facility] [[id number][facility]    HPI:    Diallo Villarreal  is 86 year old (8/31/1932), who is being seen today for a federally mandated E/M visit.  HPI information obtained from: facility chart records, facility staff, patient report and [facility] chart review. Today's concerns are:  Coronary atherosclerosis due to lipid rich plaque  S/P carotid endarterectomy  Essential hypertension  Per epic note/HX:  F/b [facility], Dr Satinder Bowman  Per Care Everywhere notes:  1. Coronary artery disease found on abnormal stress test.    9/7/2018 status post atherectomy angioplasty and stenting of the mid LAD.    9/17/2018 angioplasty and stenting of the proximal, mid and distal right coronary artery.   2. AAA measuring 6.4 cm, not amenable to endovascular therapy, follows with Dr. Reyes Enrique, Vascular Surgery.  3. Carotid artery disease status post left CEA in 2002.  4. Hypertension.  5. Hyperlipidemia.      Is on ASA 81 mg daily, metoprolol 50 mg BID, Lasix 10 mg daily (no KCl supplement, last K 3.7), nitroglycerin PRN, atorvastatin 40 mg q HS   (Plavix recently stopped for SPC placement procedure after 1 year of therapy)  (patient also on doxazosin 0.5 mg daily for obstructive uropathy/BPH)    BPs 100-130s/50-60s  HRs 67-84  Patient denies Cp, HA; endorses occasional AM lightheadedness     Abdominal aortic aneurysm (AAA) without rupture (H)  Per History:  F/b: Dr Reyes Enrique, Vascular Surgery, [facility]      3/2/19 CT Abdomen/Pel noting 6.7 cm AAA  Cardiology notes indicate need for open repair of AAA so patient was then sent to Cardiology for surgery clearance.  Stress test was abnormal and was f/b angiogram with stenting in mid LAD followed by staged procedure of RCA at  a later date. Ultimately patient elected not to have open repair and follow with surveillance U/S.     Is on ASA 81 gm daily indefinitely (also had 1 year of Plavix 75 mg daily).     Normocytic anemia  Longstanding history of Hgb 7.5-8  Fe Sulfate 325 mg daily started 7/22/19.     Hemoglobin   Date Value Ref Range Status   07/22/2019 7.6 (L) 13.3 - 17.7 g/dL Final   04/30/2019 9.4 (L) 13.3 - 17.7 g/dL Final     Patient denies any bleeding.     Obstruction, uropathy  Suprapubic catheter (H) - 7/25/19  SPC placement by Dr Washington, [facility]    8/7/19 Nursing noting:  hard lump just above his SP site measuring 1.5 cm x 1.0 cm. Lump is flush with skin. Slight redness noted by stitches. Resident VS WNL. Resident stated no pain during palpation. Urine has increased mucous discharge in catheter. Straw color. We will continue to monitor him and update if anything changes.     Today patient reports pain at SPC site along with lower abdominal pain, some penile pain, mann with cares.      Patient remains on doxazosin 0.5 mg daily, tamsulosin 0.4 mg daily     ALLERGIES:Gluten meal and Wheat extract  PAST MEDICAL HISTORY:   has a past medical history of AAA (abdominal aortic aneurysm) (H), Celiac sprue, and HTN (hypertension).  PAST SURGICAL HISTORY:   has a past surgical history that includes tonsillectomy and LEFT CAROTID ENDARTERECTOMY.  FAMILY HISTORY: family history is not on file.  SOCIAL HISTORY:  reports that he has quit smoking. He has never used smokeless tobacco. He reports that he does not drink alcohol.    MEDICATIONS:  Current Outpatient Medications   Medication Sig Dispense Refill     albuterol (PROVENTIL) (2.5 MG/3ML) 0.083% neb solution Take 2.5 mg by nebulization every 4 hours as needed for shortness of breath / dyspnea or wheezing       aspirin 81 MG EC tablet Take 81 mg by mouth daily        atorvastatin (LIPITOR) 40 MG tablet Take 40 mg by mouth daily       cinacalcet (SENSIPAR) 30 MG  tablet Take 30 mg by mouth daily       diphenhydrAMINE-zinc acetate (BENADRYL) 1-0.1 % external cream Apply topically 2 times daily as needed for itching       doxazosin (CARDURA) 1 MG tablet Take 0.5 mg by mouth daily       ferrous sulfate (FEROSUL) 325 (65 Fe) MG tablet Take 325 mg by mouth daily (with breakfast)       furosemide (LASIX) 20 MG tablet Take 10 mg by mouth daily        metoprolol tartrate (LOPRESSOR) 50 MG tablet Take 50 mg by mouth 2 times daily       nitroGLYcerin (NITROSTAT) 0.4 MG sublingual tablet Place 0.4 mg under the tongue every 5 minutes as needed for chest pain For chest pain place 1 tablet under the tongue every 5 minutes for 3 doses. If symptoms persist 5 minutes after 1st dose call 911.       nystatin (MYCOSTATIN) 265741 UNIT/GM external powder Apply topically 2 times daily Apply 1 strip topically to affected area bid of groin for skin yeast infection.       pantoprazole (PROTONIX) 40 MG EC tablet Take 40 mg by mouth daily       tamsulosin (FLOMAX) 0.4 MG capsule Take 0.4 mg by mouth daily       triamcinolone (KENALOG) 0.147 MG/GM external aerosol Apply topically 2 times daily as needed        vitamin D3 (CHOLECALCIFEROL) 1000 units (25 mcg) tablet Take 2,000 Units by mouth daily       Case Management:  I have reviewed the care plan and MDS and do agree with the plan. Patient's desire to return to the community is present, but is not able due to care needs . Information reviewed:  Medications, vital signs, orders, and nursing notes.    ROS:  10 point ROS of systems including Constitutional, Eyes, Respiratory, Cardiovascular, Gastroenterology, Genitourinary, Integumentary, Musculoskeletal, Psychiatric were all negative except for pertinent positives noted in my HPI.    Vitals:  BP (!) 147/74   Pulse 70   Temp 96.7  F (35.9  C)   Resp 18   There is no height or weight on file to calculate BMI.  Exam:  GENERAL APPEARANCE:  Alert, in no distress, pleasant  RESP:  respiratory effort and  palpation of chest normal, auscultation of lungs clear, no respiratory distress  CV:  Palpation and auscultation of heart done , rate and rhythm regular, no murmur, no LE peripheral edema  ABDOMEN:  normal bowel sounds, soft, tender in lower abdomen, no hepatosplenomegaly or other masses  : SPC site with hardness around opening (hematoma vs infection), SPC in place with sutures, cloudy foul, sludgy-thick, odorous urine.  Penis glans swollen, red, and with yeast present.   M/S:   Gait and station with wc for mobility, ambulates with nursing for Restorative Treatment, Digits and nails with hemiparesis of right side d/t CP, reduced muscle mass  SKIN:  Inspection and Palpation of skin and subcutaneous tissue with redness and pain around SPC site, jose area with redness/possible some scarring to skin,   PSYCH:  insight and judgement, memory with mild impairment , affect and mood normal,  follows commands readily         Lab/Diagnostic data:   CBC RESULTS:   Recent Labs   Lab Test 07/22/19  0806 04/30/19  1050   WBC 9.4 8.2   RBC 2.94* 3.57*   HGB 7.6* 9.4*   HCT 24.8* 29.8*   MCV 84 84   MCH 25.9* 26.3*   MCHC 30.6* 31.5   RDW 18.2* 17.3*    237       Last Basic Metabolic Panel:  Recent Labs   Lab Test 07/22/19  0806 05/10/19  0736    142   POTASSIUM 3.7 4.1   CHLORIDE 106 105   MARIN 9.0 9.1   CO2 28 32   BUN 32* 31*   CR 1.00 1.06   GLC 75 78       Liver Function Studies - No results for input(s): PROTTOTAL, ALBUMIN, BILITOTAL, ALKPHOS, AST, ALT, BILIDIRECT in the last 19441 hours.    TSH   Date Value Ref Range Status   03/15/2019 1.34 0.40 - 4.00 mU/L Final   ]    ASSESSMENT/PLAN  Coronary atherosclerosis due to lipid rich plaque  S/P carotid endarterectomy  Essential hypertension  Nursing to please call Cardiology to ask about continuation vs competition of Plavix therapy now that SPC procedure is done.  Patient did complete 1 year of therapy.    BP goals are <150/90 mm Hg.This is higher than ACC and AHA  recommendations due to goals of care, risk for hypotension, risk of dizziness and falls, risk of tissue/cerebral hypoperfusion and frailty. Noted patients BP is lower than goal and will adjust plan of care by monitoring as recent decrease in Lasix.  If continues to be low, can discontinue Lasix and monitor fluid status, etc.      Abdominal aortic aneurysm (AAA) without rupture (H)  Patient in surveillance state with AAA.  Nursing to contact Cardiology re: need to continue Plavix therapy or if therapy is OK to be complete (1 year of therapy completed).     Normocytic anemia  Baseline Hgb seemingly around 7.5-8.  Can monitor hgb for stability in near future.     Obstruction, uropathy  Suprapubic catheter (H)  Unstable at today's visit.   Possible hematoma vs abscess at SPC insertion site.  Likely UTI as urine cloudy, odorous and very thick/sludgy.  Patient afebrile but with new insertion site, will opt to treat as risks outweigh benefits of monitoring.   Will also order Diflucan as patient has yeast to jose care.  Diligent jose cares needed for patient's skin in order to heal.   Now that SPC has been placed, can discontinue tamsulosin and doxazosin; monitor for complications.       Orders written by provider at facility and transcribed by : Magaly Polo MA  1.  Discontinue Tamsulosin.  2.  Discontinue Doxazosin.  3.  Diflucan 150 mg po Q72H x 3 doses.  Dx: fungal infection  4.  Keflex 250 mg po Q6H x 10 days.  Dx: cellulitis, possible UTI      Electronically signed by:  ROCÍO Larsen CNP

## 2019-08-09 ENCOUNTER — NURSING HOME VISIT (OUTPATIENT)
Dept: GERIATRICS | Facility: CLINIC | Age: 84
End: 2019-08-09
Payer: COMMERCIAL

## 2019-08-09 VITALS
RESPIRATION RATE: 18 BRPM | DIASTOLIC BLOOD PRESSURE: 74 MMHG | HEART RATE: 70 BPM | TEMPERATURE: 96.7 F | SYSTOLIC BLOOD PRESSURE: 147 MMHG

## 2019-08-09 DIAGNOSIS — I10 ESSENTIAL HYPERTENSION: ICD-10-CM

## 2019-08-09 DIAGNOSIS — Z93.59 SUPRAPUBIC CATHETER (H): ICD-10-CM

## 2019-08-09 DIAGNOSIS — D64.9 NORMOCYTIC ANEMIA: ICD-10-CM

## 2019-08-09 DIAGNOSIS — Z98.890 S/P CAROTID ENDARTERECTOMY: ICD-10-CM

## 2019-08-09 DIAGNOSIS — N13.9 OBSTRUCTION, UROPATHY: ICD-10-CM

## 2019-08-09 DIAGNOSIS — I71.40 ABDOMINAL AORTIC ANEURYSM (AAA) WITHOUT RUPTURE (H): ICD-10-CM

## 2019-08-09 DIAGNOSIS — I25.83 CORONARY ATHEROSCLEROSIS DUE TO LIPID RICH PLAQUE: Primary | ICD-10-CM

## 2019-08-09 PROCEDURE — 99309 SBSQ NF CARE MODERATE MDM 30: CPT | Performed by: NURSE PRACTITIONER

## 2019-08-09 RX ORDER — NYSTATIN 100000 [USP'U]/G
POWDER TOPICAL 2 TIMES DAILY
COMMUNITY
End: 2019-01-01

## 2019-08-09 NOTE — LETTER
8/9/2019        RE: Diallo Villarreal  [address]  [address]  [place] [address]        [facility]  Chief Complaint   Patient presents with     shelter Regulatory     [place] Medical Record Number:  [id number]  Place of Service where encounter took place:  [facility] (S) [[id number]]    HPI:    Diallo Villarreal  is 86 year old (8/31/1932), who is being seen today for a federally mandated E/M visit.  HPI information obtained from: facility chart records, facility staff, patient report and [facility] chart review. Today's concerns are:  Coronary atherosclerosis due to lipid rich plaque  S/P carotid endarterectomy  Essential hypertension  Per epic note/HX:  F/b [facility] - [facility], Dr Satinder Bowman  Per Care Everywhere notes:  1. Coronary artery disease found on abnormal stress test.    9/7/2018 status post atherectomy angioplasty and stenting of the mid LAD.    9/17/2018 angioplasty and stenting of the proximal, mid and distal right coronary artery.   2. AAA measuring 6.4 cm, not amenable to endovascular therapy, follows with Dr. Reyes Enrique, Vascular Surgery.  3. Carotid artery disease status post left CEA in 2002.  4. Hypertension.  5. Hyperlipidemia.      Is on ASA 81 mg daily, metoprolol 50 mg BID, Lasix 10 mg daily (no KCl supplement, last K 3.7), nitroglycerin PRN, atorvastatin 40 mg q HS   (Plavix recently stopped for SPC placement procedure after 1 year of therapy)  (patient also on doxazosin 0.5 mg daily for obstructive uropathy/BPH)    BPs 100-130s/50-60s  HRs 67-84  Patient denies Cp, HA; endorses occasional AM lightheadedness     Abdominal aortic aneurysm (AAA) without rupture (H)  Per History:  F/b: Dr Reyes Enrique, Vascular Surgery, [facility]      3/2/19 CT Abdomen/Pel noting 6.7 cm AAA  Cardiology notes indicate need for open repair of AAA so patient was then sent to Cardiology for surgery clearance.  Stress test was  abnormal and was f/b angiogram with stenting in mid LAD followed by staged procedure of RCA at a later date. Ultimately patient elected not to have open repair and follow with surveillance U/S.     Is on ASA 81 gm daily indefinitely (also had 1 year of Plavix 75 mg daily).     Normocytic anemia  Longstanding history of Hgb 7.5-8  Fe Sulfate 325 mg daily started 7/22/19.     Hemoglobin   Date Value Ref Range Status   07/22/2019 7.6 (L) 13.3 - 17.7 g/dL Final   04/30/2019 9.4 (L) 13.3 - 17.7 g/dL Final     Patient denies any bleeding.     Obstruction, uropathy  Suprapubic catheter (H) - 7/25/19  SPC placement by Dr [place], ro Urology at [facility]    8/7/19 Nursing noting:  hard lump just above his SP site measuring 1.5 cm x 1.0 cm. Lump is flush with skin. Slight redness noted by stitches. Resident VS WNL. Resident stated no pain during palpation. Urine has increased mucous discharge in catheter. Straw color. We will continue to monitor him and update if anything changes.     Today patient reports pain at SPC site along with lower abdominal pain, some penile pain, mann with cares.      Patient remains on doxazosin 0.5 mg daily, tamsulosin 0.4 mg daily     ALLERGIES:Gluten meal and Wheat extract  PAST MEDICAL HISTORY:   has a past medical history of AAA (abdominal aortic aneurysm) (H), Celiac sprue, and HTN (hypertension).  PAST SURGICAL HISTORY:   has a past surgical history that includes tonsillectomy and LEFT CAROTID ENDARTERECTOMY.  FAMILY HISTORY: family history is not on file.  SOCIAL HISTORY:  reports that he has quit smoking. He has never used smokeless tobacco. He reports that he does not drink alcohol.    MEDICATIONS:  Current Outpatient Medications   Medication Sig Dispense Refill     albuterol (PROVENTIL) (2.5 MG/3ML) 0.083% neb solution Take 2.5 mg by nebulization every 4 hours as needed for shortness of breath / dyspnea or wheezing       aspirin 81 MG EC tablet Take 81 mg by mouth daily         atorvastatin (LIPITOR) 40 MG tablet Take 40 mg by mouth daily       cinacalcet (SENSIPAR) 30 MG tablet Take 30 mg by mouth daily       diphenhydrAMINE-zinc acetate (BENADRYL) 1-0.1 % external cream Apply topically 2 times daily as needed for itching       doxazosin (CARDURA) 1 MG tablet Take 0.5 mg by mouth daily       ferrous sulfate (FEROSUL) 325 (65 Fe) MG tablet Take 325 mg by mouth daily (with breakfast)       furosemide (LASIX) 20 MG tablet Take 10 mg by mouth daily        metoprolol tartrate (LOPRESSOR) 50 MG tablet Take 50 mg by mouth 2 times daily       nitroGLYcerin (NITROSTAT) 0.4 MG sublingual tablet Place 0.4 mg under the tongue every 5 minutes as needed for chest pain For chest pain place 1 tablet under the tongue every 5 minutes for 3 doses. If symptoms persist 5 minutes after 1st dose call 911.       nystatin (MYCOSTATIN) 790684 UNIT/GM external powder Apply topically 2 times daily Apply 1 strip topically to affected area bid of groin for skin yeast infection.       pantoprazole (PROTONIX) 40 MG EC tablet Take 40 mg by mouth daily       tamsulosin (FLOMAX) 0.4 MG capsule Take 0.4 mg by mouth daily       triamcinolone (KENALOG) 0.147 MG/GM external aerosol Apply topically 2 times daily as needed        vitamin D3 (CHOLECALCIFEROL) 1000 units (25 mcg) tablet Take 2,000 Units by mouth daily       Case Management:  I have reviewed the care plan and MDS and do agree with the plan. Patient's desire to return to the community is present, but is not able due to care needs . Information reviewed:  Medications, vital signs, orders, and nursing notes.    ROS:  10 point ROS of systems including Constitutional, Eyes, Respiratory, Cardiovascular, Gastroenterology, Genitourinary, Integumentary, Musculoskeletal, Psychiatric were all negative except for pertinent positives noted in my HPI.    Vitals:  BP (!) 147/74   Pulse 70   Temp 96.7  F (35.9  C)   Resp 18   There is no height or weight on file to calculate  BMI.  Exam:  GENERAL APPEARANCE:  Alert, in no distress, pleasant  RESP:  respiratory effort and palpation of chest normal, auscultation of lungs clear, no respiratory distress  CV:  Palpation and auscultation of heart done , rate and rhythm regular, no murmur, no LE peripheral edema  ABDOMEN:  normal bowel sounds, soft, tender in lower abdomen, no hepatosplenomegaly or other masses  : SPC site with hardness around opening (hematoma vs infection), SPC in place with sutures, cloudy foul, sludgy-thick, odorous urine.  Penis glans swollen, red, and with yeast present.   M/S:   Gait and station with wc for mobility, ambulates with nursing for Restorative Treatment, Digits and nails with hemiparesis of right side d/t CP, reduced muscle mass  SKIN:  Inspection and Palpation of skin and subcutaneous tissue with redness and pain around SPC site, jose area with redness/possible some scarring to skin,   PSYCH:  insight and judgement, memory with mild impairment , affect and mood normal,  follows commands readily         Lab/Diagnostic data:   CBC RESULTS:   Recent Labs   Lab Test 07/22/19  0806 04/30/19  1050   WBC 9.4 8.2   RBC 2.94* 3.57*   HGB 7.6* 9.4*   HCT 24.8* 29.8*   MCV 84 84   MCH 25.9* 26.3*   MCHC 30.6* 31.5   RDW 18.2* 17.3*    237       Last Basic Metabolic Panel:  Recent Labs   Lab Test 07/22/19  0806 05/10/19  0736    142   POTASSIUM 3.7 4.1   CHLORIDE 106 105   MARIN 9.0 9.1   CO2 28 32   BUN 32* 31*   CR 1.00 1.06   GLC 75 78       Liver Function Studies - No results for input(s): PROTTOTAL, ALBUMIN, BILITOTAL, ALKPHOS, AST, ALT, BILIDIRECT in the last 66739 hours.    TSH   Date Value Ref Range Status   03/15/2019 1.34 0.40 - 4.00 mU/L Final   ]    ASSESSMENT/PLAN  Coronary atherosclerosis due to lipid rich plaque  S/P carotid endarterectomy  Essential hypertension  Nursing to please call Cardiology to ask about continuation vs competition of Plavix therapy now that SPC procedure is done.   Patient did complete 1 year of therapy.    BP goals are <150/90 mm Hg.This is higher than ACC and AHA recommendations due to goals of care, risk for hypotension, risk of dizziness and falls, risk of tissue/cerebral hypoperfusion and frailty. Noted patients BP is lower than goal and will adjust plan of care by monitoring as recent decrease in Lasix.  If continues to be low, can discontinue Lasix and monitor fluid status, etc.      Abdominal aortic aneurysm (AAA) without rupture (H)  Patient in surveillance state with AAA.  Nursing to contact Cardiology re: need to continue Plavix therapy or if therapy is OK to be complete (1 year of therapy completed).     Normocytic anemia  Baseline Hgb seemingly around 7.5-8.  Can monitor hgb for stability in near future.     Obstruction, uropathy  Suprapubic catheter (H)  Unstable at today's visit.   Possible hematoma vs abscess at SPC insertion site.  Likely UTI as urine cloudy, odorous and very thick/sludgy.  Patient afebrile but with new insertion site, will opt to treat as risks outweigh benefits of monitoring.   Will also order Diflucan as patient has yeast to jose care.  Diligent jose cares needed for patient's skin in order to heal.   Now that SPC has been placed, can discontinue tamsulosin and doxazosin; monitor for complications.       Orders written by provider at facility and transcribed by : Magaly Polo MA  1.  Discontinue Tamsulosin.  2.  Discontinue Doxazosin.  3.  Diflucan 150 mg po Q72H x 3 doses.  Dx: fungal infection  4.  Keflex 250 mg po Q6H x 10 days.  Dx: cellulitis, possible UTI      Electronically signed by:  ROCÍO Larsen CNP              Sincerely,        ROCÍO Larsen CNP

## 2019-08-16 ENCOUNTER — HOSPITAL LABORATORY (OUTPATIENT)
Dept: NURSING HOME | Facility: OTHER | Age: 84
End: 2019-08-16

## 2019-08-16 LAB — CALCIUM SERPL-MCNC: 9.6 MG/DL (ref 8.5–10.1)

## 2019-09-03 NOTE — TELEPHONE ENCOUNTER
They asked me to have you send again (including the denial letter).      Per Jennifer Bryant NP - NPI [id number]    The patient is unable to have a parathyroidectomy due to his medical conditions.

## 2019-09-05 ENCOUNTER — NURSING HOME VISIT (OUTPATIENT)
Dept: GERIATRICS | Facility: CLINIC | Age: 84
End: 2019-09-05
Payer: COMMERCIAL

## 2019-09-05 VITALS
WEIGHT: 198.8 LBS | BODY MASS INDEX: 25.51 KG/M2 | RESPIRATION RATE: 18 BRPM | OXYGEN SATURATION: 96 % | SYSTOLIC BLOOD PRESSURE: 160 MMHG | HEART RATE: 66 BPM | HEIGHT: 74 IN | DIASTOLIC BLOOD PRESSURE: 82 MMHG | TEMPERATURE: 97.5 F

## 2019-09-05 DIAGNOSIS — R39.89 POSSIBLE URINARY TRACT INFECTION: ICD-10-CM

## 2019-09-05 DIAGNOSIS — Z93.59 SUPRAPUBIC CATHETER (H): ICD-10-CM

## 2019-09-05 DIAGNOSIS — L30.9 DERMATITIS: ICD-10-CM

## 2019-09-05 DIAGNOSIS — L03.319 CELLULITIS OF TRUNK, UNSPECIFIED SITE OF TRUNK: Primary | ICD-10-CM

## 2019-09-05 PROCEDURE — 99309 SBSQ NF CARE MODERATE MDM 30: CPT | Performed by: NURSE PRACTITIONER

## 2019-09-05 RX ORDER — CETIRIZINE HYDROCHLORIDE 10 MG/1
5 TABLET ORAL DAILY PRN
Status: ON HOLD | COMMUNITY
End: 2020-01-01

## 2019-09-05 RX ORDER — CLOTRIMAZOLE 1 %
CREAM (GRAM) TOPICAL 2 TIMES DAILY PRN
COMMUNITY
End: 2019-01-01

## 2019-09-05 RX ORDER — DOXYCYCLINE HYCLATE 100 MG
100 TABLET ORAL DAILY
COMMUNITY
Start: 2019-09-05 | End: 2019-09-19

## 2019-09-05 ASSESSMENT — MIFFLIN-ST. JEOR: SCORE: 1646.5

## 2019-09-05 NOTE — PROGRESS NOTES
[place] [facility]  [place] Medical Record Number:  [id number]  Place of Service where encounter took place[facility] [[id number][facility]  Chief Complaint   Patient presents with     Nursing Home Acute       HPI:    Diallo Villarreal  is a 87 year old (8/31/1932), who is being seen today for an episodic care visit.  HPI information obtained from: facility chart records, facility staff, patient report and [facility] chart review. Today's concern is:    Cellulitis of trunk, unspecified site of trunk  Suprapubic catheter (H)  Possible urinary tract infection  Nursing reporting green, purulent drainage around SP site today.  Patient also has redness.    Patient has been afebrile but no recording since 8/22/19.      Patient met today and is more agitated than usual.  He reports no pain at site but does flinch during examination.    Patient has history of recurrent UTIs  Now s/p SPC placement about 2 months ago.      Dermatitis  Patient has long history of dermatitis with dry skin. Various topicals have been prescribed including eczema lotions, ultra strength and motion, and Zonalon cream.  Patient currently has some topicals as needed and ultra moisturizing lotion being used.  Patient reports pruritus to his back does not keep him awake at night.        Past Medical and Surgical History reviewed in Epic today.    MEDICATIONS:  Current Outpatient Medications   Medication Sig Dispense Refill     albuterol (PROVENTIL) (2.5 MG/3ML) 0.083% neb solution Take 2.5 mg by nebulization every 4 hours as needed for shortness of breath / dyspnea or wheezing       aspirin 81 MG EC tablet Take 81 mg by mouth daily        atorvastatin (LIPITOR) 40 MG tablet Take 40 mg by mouth daily       cetirizine (ZYRTEC) 10 MG tablet Take 10 mg by mouth daily       cinacalcet (SENSIPAR) 30 MG tablet Take 30 mg by mouth daily       clotrimazole (LOTRIMIN) 1 % external cream Apply topically 2 times daily        "diphenhydrAMINE-zinc acetate (BENADRYL) 1-0.1 % external cream Apply topically 2 times daily as needed for itching       doxycycline hyclate (VIBRA-TABS) 100 MG tablet Take 100 mg by mouth daily       ferrous sulfate (FEROSUL) 325 (65 Fe) MG tablet Take 325 mg by mouth daily (with breakfast)       furosemide (LASIX) 20 MG tablet Take 10 mg by mouth daily        metoprolol tartrate (LOPRESSOR) 50 MG tablet Take 50 mg by mouth 2 times daily       nitroGLYcerin (NITROSTAT) 0.4 MG sublingual tablet Place 0.4 mg under the tongue every 5 minutes as needed for chest pain For chest pain place 1 tablet under the tongue every 5 minutes for 3 doses. If symptoms persist 5 minutes after 1st dose call 911.       nystatin (MYCOSTATIN) 995366 UNIT/GM external powder Apply topically 2 times daily Apply 1 strip topically to affected area bid of groin for skin yeast infection.       pantoprazole (PROTONIX) 40 MG EC tablet Take 40 mg by mouth daily       triamcinolone (KENALOG) 0.147 MG/GM external aerosol Apply topically 2 times daily as needed        vitamin D3 (CHOLECALCIFEROL) 1000 units (25 mcg) tablet Take 2,000 Units by mouth daily       REVIEW OF SYSTEMS:  Limited secondary to cognitive impairment but today pt reports 10 point ROS of systems including Constitutional, Eyes, Respiratory, Cardiovascular, Gastroenterology, Genitourinary, Integumentary, Musculoskeletal, Psychiatric were all negative except for pertinent positives noted in my HPI.    Objective:  BP (!) 160/82   Pulse 66   Temp 97.5  F (36.4  C)   Resp 18   Ht 1.88 m (6' 2\")   Wt 90.2 kg (198 lb 12.8 oz)   SpO2 96%   BMI 25.52 kg/m    Exam:  GENERAL APPEARANCE:  Alert, in no distress, more agitated than usual   RESP:  respiratory effort normal, no respiratory distress  CV:  No LE peripheral edema  ABDOMEN:  nondistended  : SPC site with redness and foul odor, swelling, tenderness, moisture, some bleeding. Catheter with murky/cloudy urine that is concentrated. "   M/S:   Gait and station with w/c for mobility, worsened tremor from CP than usual, Digits and nails with arthritic changes, some spasticity with CP, reuced muscle mass  SKIN:  Inspection and Palpation of skin and subcutaneous tissue with pruritic areas to patient's back, dryness and overall rash generalized   PSYCH:  insight and judgement, memory with impairment, affect and mood more agitated than usual,  follows commands readily           Labs:   CBC RESULTS:   Recent Labs   Lab Test 07/22/19  0806 04/30/19  1050   WBC 9.4 8.2   RBC 2.94* 3.57*   HGB 7.6* 9.4*   HCT 24.8* 29.8*   MCV 84 84   MCH 25.9* 26.3*   MCHC 30.6* 31.5   RDW 18.2* 17.3*    237       Last Basic Metabolic Panel:  Recent Labs   Lab Test 08/16/19  0759 07/22/19  0806 05/10/19  0736   NA  --  140 142   POTASSIUM  --  3.7 4.1   CHLORIDE  --  106 105   MARIN 9.6 9.0 9.1   CO2  --  28 32   BUN  --  32* 31*   CR  --  1.00 1.06   GLC  --  75 78       Liver Function Studies - No results for input(s): PROTTOTAL, ALBUMIN, BILITOTAL, ALKPHOS, AST, ALT, BILIDIRECT in the last 82165 hours.    TSH   Date Value Ref Range Status   03/15/2019 1.34 0.40 - 4.00 mU/L Final   ]    ASSESSMENT/PLAN:  Cellulitis of trunk, unspecified site of trunk  Suprapubic catheter (H)  Possible urinary tract infection  Possible UTI but certainly cellulitis with purulence at SPC site.  Will order doxycycline for treatment with extended treatment as patient has recurrent infections that likely are difficult to eradicate.   Diligent care around site needed.    Will order Keflex as likely can treat cellulitis and possible UTI.     Dermatitis  Would like to avoid class 1 antihistamines as on BEER's list and also may contribute to confusion, lethargy, etc.  Will attempt Class 2 antihistamine and continue topicals, including diphenhydramine topical cream, triamcinolone PRN, etc.   Will need close monitoring.       Orders written by provider at facility and transcribed by team  coordinator: Magaly Polo MA  1.  Doxycycline 100 mg po every day x 10 days.  Dx: recurrent cellulites, UTI  2.  Cetirizine 10 mg po every day.  Dx: pruritis, dermatitis       Electronically signed by:  ROCÍO Larsen CNP

## 2019-09-05 NOTE — LETTER
9/5/2019        RE: Diallo Villarreal  [address]  [address]  [place] [address]        [facility]  [place] Medical Record Number:  [id number]  Place of Service where encounter took place:  [facility] [place] [facility] [[id number]]  Chief Complaint   Patient presents with     Nursing Home Acute       HPI:    Diallo Villarreal  is a 87 year old (8/31/1932), who is being seen today for an episodic care visit.  HPI information obtained from: facility chart records, facility staff, patient report and [facility] chart review. Today's concern is:    Cellulitis of trunk, unspecified site of trunk  Suprapubic catheter (H)  Possible urinary tract infection  Nursing reporting green, purulent drainage around SP site today.  Patient also has redness.    Patient has been afebrile but no recording since 8/22/19.      Patient met today and is more agitated than usual.  He reports no pain at site but does flinch during examination.    Patient has history of recurrent UTIs  Now s/p SPC placement about 2 months ago.      Dermatitis  Patient has long history of dermatitis with dry skin. Various topicals have been prescribed including eczema lotions, ultra strength and motion, and Zonalon cream.  Patient currently has some topicals as needed and ultra moisturizing lotion being used.  Patient reports pruritus to his back does not keep him awake at night.        Past Medical and Surgical History reviewed in Epic today.    MEDICATIONS:  Current Outpatient Medications   Medication Sig Dispense Refill     albuterol (PROVENTIL) (2.5 MG/3ML) 0.083% neb solution Take 2.5 mg by nebulization every 4 hours as needed for shortness of breath / dyspnea or wheezing       aspirin 81 MG EC tablet Take 81 mg by mouth daily        atorvastatin (LIPITOR) 40 MG tablet Take 40 mg by mouth daily       cetirizine (ZYRTEC) 10 MG tablet Take 10 mg by mouth daily       cinacalcet (SENSIPAR) 30 MG tablet Take 30 mg by mouth  "daily       clotrimazole (LOTRIMIN) 1 % external cream Apply topically 2 times daily       diphenhydrAMINE-zinc acetate (BENADRYL) 1-0.1 % external cream Apply topically 2 times daily as needed for itching       doxycycline hyclate (VIBRA-TABS) 100 MG tablet Take 100 mg by mouth daily       ferrous sulfate (FEROSUL) 325 (65 Fe) MG tablet Take 325 mg by mouth daily (with breakfast)       furosemide (LASIX) 20 MG tablet Take 10 mg by mouth daily        metoprolol tartrate (LOPRESSOR) 50 MG tablet Take 50 mg by mouth 2 times daily       nitroGLYcerin (NITROSTAT) 0.4 MG sublingual tablet Place 0.4 mg under the tongue every 5 minutes as needed for chest pain For chest pain place 1 tablet under the tongue every 5 minutes for 3 doses. If symptoms persist 5 minutes after 1st dose call 911.       nystatin (MYCOSTATIN) 298107 UNIT/GM external powder Apply topically 2 times daily Apply 1 strip topically to affected area bid of groin for skin yeast infection.       pantoprazole (PROTONIX) 40 MG EC tablet Take 40 mg by mouth daily       triamcinolone (KENALOG) 0.147 MG/GM external aerosol Apply topically 2 times daily as needed        vitamin D3 (CHOLECALCIFEROL) 1000 units (25 mcg) tablet Take 2,000 Units by mouth daily       REVIEW OF SYSTEMS:  Limited secondary to cognitive impairment but today pt reports 10 point ROS of systems including Constitutional, Eyes, Respiratory, Cardiovascular, Gastroenterology, Genitourinary, Integumentary, Musculoskeletal, Psychiatric were all negative except for pertinent positives noted in my HPI.    Objective:  BP (!) 160/82   Pulse 66   Temp 97.5  F (36.4  C)   Resp 18   Ht 1.88 m (6' 2\")   Wt 90.2 kg (198 lb 12.8 oz)   SpO2 96%   BMI 25.52 kg/m     Exam:  GENERAL APPEARANCE:  Alert, in no distress, more agitated than usual   RESP:  respiratory effort normal, no respiratory distress  CV:  No LE peripheral edema  ABDOMEN:  nondistended  : SPC site with redness and foul odor, swelling, " tenderness, moisture, some bleeding. Catheter with murky/cloudy urine that is concentrated.   M/S:   Gait and station with w/c for mobility, worsened tremor from CP than usual, Digits and nails with arthritic changes, some spasticity with CP, reuced muscle mass  SKIN:  Inspection and Palpation of skin and subcutaneous tissue with pruritic areas to patient's back, dryness and overall rash generalized   PSYCH:  insight and judgement, memory with impairment, affect and mood more agitated than usual,  follows commands readily           Labs:   CBC RESULTS:   Recent Labs   Lab Test 07/22/19  0806 04/30/19  1050   WBC 9.4 8.2   RBC 2.94* 3.57*   HGB 7.6* 9.4*   HCT 24.8* 29.8*   MCV 84 84   MCH 25.9* 26.3*   MCHC 30.6* 31.5   RDW 18.2* 17.3*    237       Last Basic Metabolic Panel:  Recent Labs   Lab Test 08/16/19  0759 07/22/19  0806 05/10/19  0736   NA  --  140 142   POTASSIUM  --  3.7 4.1   CHLORIDE  --  106 105   MARIN 9.6 9.0 9.1   CO2  --  28 32   BUN  --  32* 31*   CR  --  1.00 1.06   GLC  --  75 78       Liver Function Studies - No results for input(s): PROTTOTAL, ALBUMIN, BILITOTAL, ALKPHOS, AST, ALT, BILIDIRECT in the last 90111 hours.    TSH   Date Value Ref Range Status   03/15/2019 1.34 0.40 - 4.00 mU/L Final   ]    ASSESSMENT/PLAN:  Cellulitis of trunk, unspecified site of trunk  Suprapubic catheter (H)  Possible urinary tract infection  Possible UTI but certainly cellulitis with purulence at SPC site.  Will order doxycycline for treatment with extended treatment as patient has recurrent infections that likely are difficult to eradicate.   Diligent care around site needed.    Will order Keflex as likely can treat cellulitis and possible UTI.     Dermatitis  Would like to avoid class 1 antihistamines as on BEER's list and also may contribute to confusion, lethargy, etc.  Will attempt Class 2 antihistamine and continue topicals, including diphenhydramine topical cream, triamcinolone PRN, etc.   Will need  close monitoring.       Orders written by provider at facility and transcribed by : Magaly Polo MA  1.  Doxycycline 100 mg po every day x 10 days.  Dx: recurrent cellulites, UTI  2.  Cetirizine 10 mg po every day.  Dx: pruritis, dermatitis       Electronically signed by:  ROCÍO Larsen CNP               Sincerely,        ROCÍO Larsen CNP

## 2019-09-06 NOTE — TELEPHONE ENCOUNTER
Medication Appeal Initiation    We have initiated an appeal for the requested medication:  Medication: Sensipar  Appeal Start Date:  9/6/2019  Insurance Company: [employer] - Phone [phone] Fax [phone]  Comments:  Appeal initiated and faxed to [employer] fax# [phone]

## 2019-09-17 NOTE — TELEPHONE ENCOUNTER
MEDICATION APPEAL APPROVED    Medication: Sensipar  Authorization Effective Date: 6/8/2019  Authorization Expiration Date: 9/6/2020  Approved Dose/Quantity: 30  Reference #: [id number]   Insurance Company: [employer] - Phone [phone] Fax [phone]    Called  at [phone] to check status of this appeal and per representative this was approved and they backdated this to 06/08/2019-09/06/2020. She will resend the approval letter and will document once received.

## 2019-09-18 NOTE — PROGRESS NOTES
[facility]  [place] Medical Record Number:  [id number]  Place of Service where encounter took place:  [facility] AND REHAB [facility] ([facility] [[id number]]  Chief Complaint   Patient presents with     Nursing Home Acute       HPI:    Diallo Villarreal  is a 87 year old (8/31/1932), who is being seen today for an episodic care visit.  HPI information obtained from: facility chart records, facility staff, patient report and [facility] chart review. Today's concern is:     Suprapubic catheter (H)  Obstruction, uropathy  Retention of urine  Rash of genital area     Patient has history of obstructive uropathy with urinary retention, had indwelling catheter and now has transitioned to SPC d/t frequent UTIs and discomfort.  Nursing reports persistent green drainage at cath site without effectiveness of clotrimazole.  Patient treated last for cellulitis on 9/5-9/15 with doxycycline and prior to that was treated with Keflex.  NUrsing reports that post antibiotics - site looked improved.     Patient visited today in his SNF room where he reports some pain at his SPC site.  He denies any other pain.    Nursing reports he has redness around his SPC site and all throughout his groin area.    NUrsing also reports that patient's skin becomes more inflammed after using wound cleanser on the area.     Patient has history of sensitive skin with dermatitis and eczema, atopic dermatitis.      Past Medical and Surgical History reviewed in Epic today.    MEDICATIONS:  Current Outpatient Medications   Medication Sig Dispense Refill     albuterol (PROVENTIL) (2.5 MG/3ML) 0.083% neb solution Take 2.5 mg by nebulization every 4 hours as needed for shortness of breath / dyspnea or wheezing       aspirin 81 MG EC tablet Take 81 mg by mouth daily        atorvastatin (LIPITOR) 40 MG tablet Take 40 mg by mouth daily       cetirizine (ZYRTEC) 10 MG tablet Take 10 mg by mouth daily       cinacalcet (SENSIPAR) 30 MG tablet Take 30  mg by mouth daily       clotrimazole (LOTRIMIN) 1 % external cream Apply topically 2 times daily       diphenhydrAMINE-zinc acetate (BENADRYL) 1-0.1 % external cream Apply topically 2 times daily as needed for itching       ferrous sulfate (FEROSUL) 325 (65 Fe) MG tablet Take 325 mg by mouth daily (with breakfast)       furosemide (LASIX) 20 MG tablet Take 10 mg by mouth daily        metoprolol tartrate (LOPRESSOR) 50 MG tablet Take 50 mg by mouth 2 times daily       nitroGLYcerin (NITROSTAT) 0.4 MG sublingual tablet Place 0.4 mg under the tongue every 5 minutes as needed for chest pain For chest pain place 1 tablet under the tongue every 5 minutes for 3 doses. If symptoms persist 5 minutes after 1st dose call 911.       nystatin (MYCOSTATIN) 671979 UNIT/GM external powder Apply topically 2 times daily Apply 1 strip topically to affected area bid of groin for skin yeast infection.       pantoprazole (PROTONIX) 40 MG EC tablet Take 40 mg by mouth daily       triamcinolone (KENALOG) 0.147 MG/GM external aerosol Apply topically 2 times daily as needed        vitamin D3 (CHOLECALCIFEROL) 1000 units (25 mcg) tablet Take 2,000 Units by mouth daily       REVIEW OF SYSTEMS:  Limited secondary to cognitive impairment but today pt reports 10 point ROS of systems including Constitutional, Eyes, Respiratory, Cardiovascular, Gastroenterology, Genitourinary, Integumentary, Musculoskeletal, Psychiatric were all negative except for pertinent positives noted in my HPI.    Objective:  /70   Pulse 68   Temp 97  F (36.1  C)   Resp 18   Wt 90.2 kg (198 lb 12.8 oz)   SpO2 97%   BMI 25.52 kg/m    Exam:  GENERAL APPEARANCE:  Alert, in no distress  RESP:  respiratory effort normal, no respiratory distress  CV:  Mild LLE peripheral edema, no RLE edema  ABDOMEN:  nondistended  M/S:   Gait and station with W/C fo rmobility, Digits and nails with arthritic changes, reduced muscle mass  SKIN:  Inspection and Palpation of skin and  subcutaneous tissue with overt bright red skin around SPC site and in groin area.  There is mild yellow drainage which may be purulent but it is difficult to assess on gauze.    PSYCH:  insight and judgement, memory with some impairment, affect and mood normal, follows commands readily       Labs:   CBC RESULTS:   Recent Labs   Lab Test 07/22/19  0806 04/30/19  1050   WBC 9.4 8.2   RBC 2.94* 3.57*   HGB 7.6* 9.4*   HCT 24.8* 29.8*   MCV 84 84   MCH 25.9* 26.3*   MCHC 30.6* 31.5   RDW 18.2* 17.3*    237       Last Basic Metabolic Panel:  Recent Labs   Lab Test 08/16/19  0759 07/22/19  0806 05/10/19  0736   NA  --  140 142   POTASSIUM  --  3.7 4.1   CHLORIDE  --  106 105   MARIN 9.6 9.0 9.1   CO2  --  28 32   BUN  --  32* 31*   CR  --  1.00 1.06   GLC  --  75 78       Liver Function Studies - No results for input(s): PROTTOTAL, ALBUMIN, BILITOTAL, ALKPHOS, AST, ALT, BILIDIRECT in the last 46890 hours.    TSH   Date Value Ref Range Status   03/15/2019 1.34 0.40 - 4.00 mU/L Final   ]    ASSESSMENT/PLAN:     Suprapubic catheter (H)  Obstruction, uropathy  Retention of urine  Rash of genital area     Significant irritation and some pain for patient makes this area unstable with possible infection present.   Will order wound culture to assess if redness and drainage associated with infection vs dermatitis of some sort.  Patient has known sensitive skin with other atopic dermatitis and eczema present.    Will attempt just keeping area around the SPC site clean without use of wound  as may be contributory to irritation.      Orders written by provider at facility and transcribed by : Tate Jaffe  1. Culture swab form SPC site Dx: Recurrent Dermatitis, Recurrent UTI's, Pain, Punnett draining   2. SPC Cares: no wound cleanser or gauze; just baby shampoo and water TID      Electronically signed by:  ROCÍO Larsen CNP

## 2019-09-19 ENCOUNTER — HOSPITAL LABORATORY (OUTPATIENT)
Dept: NURSING HOME | Facility: OTHER | Age: 84
End: 2019-09-19

## 2019-09-19 ENCOUNTER — NURSING HOME VISIT (OUTPATIENT)
Dept: GERIATRICS | Facility: CLINIC | Age: 84
End: 2019-09-19
Payer: COMMERCIAL

## 2019-09-19 VITALS
BODY MASS INDEX: 25.52 KG/M2 | HEART RATE: 68 BPM | OXYGEN SATURATION: 97 % | RESPIRATION RATE: 18 BRPM | DIASTOLIC BLOOD PRESSURE: 70 MMHG | TEMPERATURE: 97 F | WEIGHT: 198.8 LBS | SYSTOLIC BLOOD PRESSURE: 123 MMHG

## 2019-09-19 DIAGNOSIS — R33.9 RETENTION OF URINE: ICD-10-CM

## 2019-09-19 DIAGNOSIS — Z93.59 SUPRAPUBIC CATHETER (H): Primary | ICD-10-CM

## 2019-09-19 DIAGNOSIS — N13.9 OBSTRUCTION, UROPATHY: ICD-10-CM

## 2019-09-19 DIAGNOSIS — R21 RASH OF GENITAL AREA: ICD-10-CM

## 2019-09-19 PROCEDURE — 99309 SBSQ NF CARE MODERATE MDM 30: CPT | Performed by: NURSE PRACTITIONER

## 2019-09-19 NOTE — LETTER
9/19/2019        RE: Diallo Villarreal  [address]  [address]  [place] [address]        [facility]  [place] [facility] Record Number:  [id number]  Place of Service where encounter took place:  [facility] [[id number]]  Chief Complaint   Patient presents with     Nursing Home Acute       HPI:    Diallo Villarreal  is a 87 year old (8/31/1932), who is being seen today for an episodic care visit.  HPI information obtained from: facility chart records, facility staff, patient report and [facility] chart review. Today's concern is:     Suprapubic catheter (H)  Obstruction, uropathy  Retention of urine  Rash of genital area     Patient has history of obstructive uropathy with urinary retention, had indwelling catheter and now has transitioned to SPC d/t frequent UTIs and discomfort.  Nursing reports persistent green drainage at cath site without effectiveness of clotrimazole.  Patient treated last for cellulitis on 9/5-9/15 with doxycycline and prior to that was treated with Keflex.  NUrsing reports that post antibiotics - site looked improved.     Patient visited today in his SNF room where he reports some pain at his SPC site.  He denies any other pain.    Nursing reports he has redness around his SPC site and all throughout his groin area.    NUrsing also reports that patient's skin becomes more inflammed after using wound cleanser on the area.     Patient has history of sensitive skin with dermatitis and eczema, atopic dermatitis.      Past Medical and Surgical History reviewed in Epic today.    MEDICATIONS:  Current Outpatient Medications   Medication Sig Dispense Refill     albuterol (PROVENTIL) (2.5 MG/3ML) 0.083% neb solution Take 2.5 mg by nebulization every 4 hours as needed for shortness of breath / dyspnea or wheezing       aspirin 81 MG EC tablet Take 81 mg by mouth daily        atorvastatin (LIPITOR) 40 MG tablet Take 40 mg by mouth daily       cetirizine  (ZYRTEC) 10 MG tablet Take 10 mg by mouth daily       cinacalcet (SENSIPAR) 30 MG tablet Take 30 mg by mouth daily       clotrimazole (LOTRIMIN) 1 % external cream Apply topically 2 times daily       diphenhydrAMINE-zinc acetate (BENADRYL) 1-0.1 % external cream Apply topically 2 times daily as needed for itching       ferrous sulfate (FEROSUL) 325 (65 Fe) MG tablet Take 325 mg by mouth daily (with breakfast)       furosemide (LASIX) 20 MG tablet Take 10 mg by mouth daily        metoprolol tartrate (LOPRESSOR) 50 MG tablet Take 50 mg by mouth 2 times daily       nitroGLYcerin (NITROSTAT) 0.4 MG sublingual tablet Place 0.4 mg under the tongue every 5 minutes as needed for chest pain For chest pain place 1 tablet under the tongue every 5 minutes for 3 doses. If symptoms persist 5 minutes after 1st dose call 911.       nystatin (MYCOSTATIN) 013121 UNIT/GM external powder Apply topically 2 times daily Apply 1 strip topically to affected area bid of groin for skin yeast infection.       pantoprazole (PROTONIX) 40 MG EC tablet Take 40 mg by mouth daily       triamcinolone (KENALOG) 0.147 MG/GM external aerosol Apply topically 2 times daily as needed        vitamin D3 (CHOLECALCIFEROL) 1000 units (25 mcg) tablet Take 2,000 Units by mouth daily       REVIEW OF SYSTEMS:  Limited secondary to cognitive impairment but today pt reports 10 point ROS of systems including Constitutional, Eyes, Respiratory, Cardiovascular, Gastroenterology, Genitourinary, Integumentary, Musculoskeletal, Psychiatric were all negative except for pertinent positives noted in my HPI.    Objective:  /70   Pulse 68   Temp 97  F (36.1  C)   Resp 18   Wt 90.2 kg (198 lb 12.8 oz)   SpO2 97%   BMI 25.52 kg/m     Exam:  GENERAL APPEARANCE:  Alert, in no distress  RESP:  respiratory effort normal, no respiratory distress  CV:  Mild LLE peripheral edema, no RLE edema  ABDOMEN:  nondistended  M/S:   Gait and station with W/C fo rmobility, Digits and  nails with arthritic changes, reduced muscle mass  SKIN:  Inspection and Palpation of skin and subcutaneous tissue with overt bright red skin around SPC site and in groin area.  There is mild yellow drainage which may be purulent but it is difficult to assess on gauze.    PSYCH:  insight and judgement, memory with some impairment, affect and mood normal, follows commands readily       Labs:   CBC RESULTS:   Recent Labs   Lab Test 07/22/19  0806 04/30/19  1050   WBC 9.4 8.2   RBC 2.94* 3.57*   HGB 7.6* 9.4*   HCT 24.8* 29.8*   MCV 84 84   MCH 25.9* 26.3*   MCHC 30.6* 31.5   RDW 18.2* 17.3*    237       Last Basic Metabolic Panel:  Recent Labs   Lab Test 08/16/19  0759 07/22/19  0806 05/10/19  0736   NA  --  140 142   POTASSIUM  --  3.7 4.1   CHLORIDE  --  106 105   MARIN 9.6 9.0 9.1   CO2  --  28 32   BUN  --  32* 31*   CR  --  1.00 1.06   GLC  --  75 78       Liver Function Studies - No results for input(s): PROTTOTAL, ALBUMIN, BILITOTAL, ALKPHOS, AST, ALT, BILIDIRECT in the last 25694 hours.    TSH   Date Value Ref Range Status   03/15/2019 1.34 0.40 - 4.00 mU/L Final   ]    ASSESSMENT/PLAN:     Suprapubic catheter (H)  Obstruction, uropathy  Retention of urine  Rash of genital area     Significant irritation and some pain for patient makes this area unstable with possible infection present.   Will order wound culture to assess if redness and drainage associated with infection vs dermatitis of some sort.  Patient has known sensitive skin with other atopic dermatitis and eczema present.    Will attempt just keeping area around the SPC site clean without use of wound  as may be contributory to irritation.      Orders written by provider at facility and transcribed by : Tate Jaffe  1. Culture swab form SPC site Dx: Recurrent Dermatitis, Recurrent UTI's, Pain, Punnett draining   2. SPC Cares: no wound cleanser or gauze; just baby shampoo and water TID      Electronically signed by:  Jennifer POST  ROCÍO Bryant CNP               Sincerely,        ROCÍO Larsen CNP

## 2019-10-01 NOTE — PROGRESS NOTES
[facility]  [place] Medical Record Number:  [id number]  Place of Service where encounter took place:  [facility] [[id number]]  Chief Complaint   Patient presents with     Nursing Home Acute       HPI:    Diallo Villarreal  is a 87 year old (8/31/1932), who is being seen today for an episodic care visit.  HPI information obtained from: facility chart records, facility staff, patient report and [facility] chart review. Today's concern is:     Generalized muscle weakness  Obstruction, uropathy  Suprapubic catheter (H)  Possible urinary tract infection  Dermatitis  Spastic hemiplegic cerebral palsy (H)  Frail elderly  Acute confusion  History of recurrent UTIs     Nursing reporting patient is more confused and much weaker in past couple days, last night needing the 4 point lift for transfer into bed, today laying on his right side with inability to lay flat on his back (spastic).   Patient has History of spastic CP, recurrent UTIs with chronic SPC placement for obstructive uropathy.    In the past with UTI, patient has had confusion and weakness.  [facility] EHR showing patient afebrile however today upon exam he is diaphoretic and c/o chills.      Patient currently is being treated with Bactrim DS for SPC infection and also Diflucan for yeast in groin area and SPC site.     Only new med recently started is cetirizine for atopic dermatitis, which is very red/flaring right now.      Patient is met in his SNF room where he reports feeling malaise but cannot elaborate any further.  He denies upset stomach, diarrhea, constipation, SOB, CP.  He denies pruritis to his skin at this time.      Past Medical and Surgical History reviewed in Epic today.    MEDICATIONS:  Current Outpatient Medications   Medication Sig Dispense Refill     albuterol (PROVENTIL) (2.5 MG/3ML) 0.083% neb solution Take 2.5 mg by nebulization every 4 hours as needed for shortness of breath / dyspnea or wheezing    "    aspirin 81 MG EC tablet Take 81 mg by mouth daily        atorvastatin (LIPITOR) 40 MG tablet Take 40 mg by mouth daily       cetirizine (ZYRTEC) 10 MG tablet Take 10 mg by mouth daily as needed        cinacalcet (SENSIPAR) 30 MG tablet Take 30 mg by mouth daily       clotrimazole (LOTRIMIN) 1 % external cream Apply topically 2 times daily       diphenhydrAMINE-zinc acetate (BENADRYL) 1-0.1 % external cream Apply topically 2 times daily as needed for itching       ferrous sulfate (FEROSUL) 325 (65 Fe) MG tablet Take 325 mg by mouth daily (with breakfast)       furosemide (LASIX) 20 MG tablet Take 10 mg by mouth daily        metoprolol tartrate (LOPRESSOR) 50 MG tablet Take 50 mg by mouth 2 times daily       mineral oil-hydrophilic petrolatum (AQUAPHOR) external ointment Apply topically 2 times daily Also BID PRN       nitroGLYcerin (NITROSTAT) 0.4 MG sublingual tablet Place 0.4 mg under the tongue every 5 minutes as needed for chest pain For chest pain place 1 tablet under the tongue every 5 minutes for 3 doses. If symptoms persist 5 minutes after 1st dose call 911.       nystatin (MYCOSTATIN) 695826 UNIT/GM external powder Apply topically 2 times daily Apply 1 strip topically to affected area bid of groin for skin yeast infection.       pantoprazole (PROTONIX) 40 MG EC tablet Take 40 mg by mouth daily       triamcinolone (KENALOG) 0.147 MG/GM external aerosol Apply topically 2 times daily as needed        vitamin D3 (CHOLECALCIFEROL) 1000 units (25 mcg) tablet Take 2,000 Units by mouth daily         REVIEW OF SYSTEMS:  Limited secondary to cognitive impairment but today pt reports 10 point ROS of systems including Constitutional, Eyes, Respiratory, Cardiovascular, Gastroenterology, Genitourinary, Integumentary, Musculoskeletal, Psychiatric were all negative except for pertinent positives noted in my HPI.    Objective:  /64   Pulse 70   Temp 97.2  F (36.2  C)   Resp 16   Ht 1.88 m (6' 2\")   Wt 90 kg " (198 lb 6.4 oz)   SpO2 97%   BMI 25.47 kg/m    Exam:  GENERAL APPEARANCE:  Confused, deconditioned, in no overt distress, mildly diaphoretic, contracted  RESP:  respiratory effort and palpation of chest normal, auscultation of lungs clear, no respiratory distress  CV:  Palpation and auscultation of heart done , rate and rhythm regular, no  murmur, mild LE peripheral edema  ABDOMEN:  normal bowel sounds, soft, nontender, no hepatosplenomegaly or other masses  M/S:   Gait and station with W/C for mobility, Digits and nails with arthritic changes, reduced muscle mass, contracted position currently laying on right side with inability to lay on his back.   SKIN:  Inspection and Palpation of skin and subcutaneous tissue with dermatitis and dry skin to LEs, groin not visualized this visit but has been very red/raw.   PSYCH:  insight and judgement, memory with impairment, affect and mood with malaise yet also agitated with my visit, intermittently follows commands     Labs:   CBC RESULTS:   Recent Labs   Lab Test 07/22/19  0806 04/30/19  1050   WBC 9.4 8.2   RBC 2.94* 3.57*   HGB 7.6* 9.4*   HCT 24.8* 29.8*   MCV 84 84   MCH 25.9* 26.3*   MCHC 30.6* 31.5   RDW 18.2* 17.3*    237       Last Basic Metabolic Panel:  Recent Labs   Lab Test 08/16/19  0759 07/22/19  0806 05/10/19  0736   NA  --  140 142   POTASSIUM  --  3.7 4.1   CHLORIDE  --  106 105   MARIN 9.6 9.0 9.1   CO2  --  28 32   BUN  --  32* 31*   CR  --  1.00 1.06   GLC  --  75 78       Liver Function Studies - No results for input(s): PROTTOTAL, ALBUMIN, BILITOTAL, ALKPHOS, AST, ALT, BILIDIRECT in the last 77713 hours.    TSH   Date Value Ref Range Status   03/15/2019 1.34 0.40 - 4.00 mU/L Final   ]      ASSESSMENT/PLAN:  Generalized muscle weakness  Possibly 2/2 infection or metabolic etiology; more likely UTI as this has bee his presentation in the past.   Will draw stat labs and monitor for results; if worsening, send to ED for eval.     Obstruction,  uropathy  Suprapubic catheter (H)  Possible urinary tract infection  Spastic hemiplegic cerebral palsy (H)  Frail elderly  Acute confusion  History of recurrent UTIs  Possible UTI despite being on Bactrim currently and Diflucan.    Urine today yellow, not cloudy, but sediment present.    Overall appearance of being ill with diaphoresis and chills, weakness and possible increase in spasticity with CP and infection.   If Labs unable to be obtained and patient continues to decline - would recommend sending to ED for eval.  Will hold off on Rocephin at this time as patient has been on Bactrim, but will monitor for lab results and UC results closely.      Dermatitis  Only new med to recently start was cetirizine.  Hold for now and change to PRN as may be contributory to confusion.    Can change cavilon lotion to Aquaphor for increased moisture.   Derm consult placed and [facility] setting [place] appt.   Patient has had steroid topical cream in the past but to avoid skin atrophy, do not recommend prolonged course of this.       Orders written by provider at facility and transcribed by : Magaly Polo MA  1.  Change Cetirizine to PRN for now.  2.  UC only.  Dx: recurrent UTIs, SPC placement, change in functional status  3.  Change Cavilon to Aquaphor ointment BID and BID PRN.  Dx: dermatitis  4.  stat labs: BMP, CBC w/diff.  Dx: confusion, recurrent UTIs, hypercalcemia, HTN      Electronically signed by:  ROCÍO Larsen CNP

## 2019-10-01 NOTE — LETTER
10/1/2019        RE: Diallo Villarreal  [address]  [address]  [place] [address]        [facility]  [place] Medical Record Number:  [id number]  Place of Service where encounter took place:  [facility] ([facility]) [[id number]]  Chief Complaint   Patient presents with     Nursing Home Acute       HPI:    Diallo Villarreal  is a 87 year old (8/31/1932), who is being seen today for an episodic care visit.  HPI information obtained from: facility chart records, facility staff, patient report and [facility] chart review. Today's concern is:     Generalized muscle weakness  Obstruction, uropathy  Suprapubic catheter (H)  Possible urinary tract infection  Dermatitis  Spastic hemiplegic cerebral palsy (H)  Frail elderly  Acute confusion  History of recurrent UTIs     Nursing reporting patient is more confused and much weaker in past couple days, last night needing the 4 point lift for transfer into bed, today laying on his right side with inability to lay flat on his back (spastic).   Patient has History of spastic CP, recurrent UTIs with chronic SPC placement for obstructive uropathy.    In the past with UTI, patient has had confusion and weakness.  SNF EHR showing patient afebrile however today upon exam he is diaphoretic and c/o chills.      Patient currently is being treated with Bactrim DS for SPC infection and also Diflucan for yeast in groin area and SPC site.     Only new med recently started is cetirizine for atopic dermatitis, which is very red/flaring right now.      Patient is met in his SNF room where he reports feeling malaise but cannot elaborate any further.  He denies upset stomach, diarrhea, constipation, SOB, CP.  He denies pruritis to his skin at this time.      Past Medical and Surgical History reviewed in Epic today.    MEDICATIONS:  Current Outpatient Medications   Medication Sig Dispense Refill     albuterol (PROVENTIL) (2.5 MG/3ML) 0.083% neb solution Take  2.5 mg by nebulization every 4 hours as needed for shortness of breath / dyspnea or wheezing       aspirin 81 MG EC tablet Take 81 mg by mouth daily        atorvastatin (LIPITOR) 40 MG tablet Take 40 mg by mouth daily       cetirizine (ZYRTEC) 10 MG tablet Take 10 mg by mouth daily as needed        cinacalcet (SENSIPAR) 30 MG tablet Take 30 mg by mouth daily       clotrimazole (LOTRIMIN) 1 % external cream Apply topically 2 times daily       diphenhydrAMINE-zinc acetate (BENADRYL) 1-0.1 % external cream Apply topically 2 times daily as needed for itching       ferrous sulfate (FEROSUL) 325 (65 Fe) MG tablet Take 325 mg by mouth daily (with breakfast)       furosemide (LASIX) 20 MG tablet Take 10 mg by mouth daily        metoprolol tartrate (LOPRESSOR) 50 MG tablet Take 50 mg by mouth 2 times daily       mineral oil-hydrophilic petrolatum (AQUAPHOR) external ointment Apply topically 2 times daily Also BID PRN       nitroGLYcerin (NITROSTAT) 0.4 MG sublingual tablet Place 0.4 mg under the tongue every 5 minutes as needed for chest pain For chest pain place 1 tablet under the tongue every 5 minutes for 3 doses. If symptoms persist 5 minutes after 1st dose call 911.       nystatin (MYCOSTATIN) 697026 UNIT/GM external powder Apply topically 2 times daily Apply 1 strip topically to affected area bid of groin for skin yeast infection.       pantoprazole (PROTONIX) 40 MG EC tablet Take 40 mg by mouth daily       triamcinolone (KENALOG) 0.147 MG/GM external aerosol Apply topically 2 times daily as needed        vitamin D3 (CHOLECALCIFEROL) 1000 units (25 mcg) tablet Take 2,000 Units by mouth daily         REVIEW OF SYSTEMS:  Limited secondary to cognitive impairment but today pt reports 10 point ROS of systems including Constitutional, Eyes, Respiratory, Cardiovascular, Gastroenterology, Genitourinary, Integumentary, Musculoskeletal, Psychiatric were all negative except for pertinent positives noted in my  "HPI.    Objective:  /64   Pulse 70   Temp 97.2  F (36.2  C)   Resp 16   Ht 1.88 m (6' 2\")   Wt 90 kg (198 lb 6.4 oz)   SpO2 97%   BMI 25.47 kg/m     Exam:  GENERAL APPEARANCE:  Confused, deconditioned, in no overt distress, mildly diaphoretic, contracted  RESP:  respiratory effort and palpation of chest normal, auscultation of lungs clear, no respiratory distress  CV:  Palpation and auscultation of heart done , rate and rhythm regular, no  murmur, mild LE peripheral edema  ABDOMEN:  normal bowel sounds, soft, nontender, no hepatosplenomegaly or other masses  M/S:   Gait and station with W/C for mobility, Digits and nails with arthritic changes, reduced muscle mass, contracted position currently laying on right side with inability to lay on his back.   SKIN:  Inspection and Palpation of skin and subcutaneous tissue with dermatitis and dry skin to LEs, groin not visualized this visit but has been very red/raw.   PSYCH:  insight and judgement, memory with impairment, affect and mood with malaise yet also agitated with my visit, intermittently follows commands     Labs:   CBC RESULTS:   Recent Labs   Lab Test 07/22/19  0806 04/30/19  1050   WBC 9.4 8.2   RBC 2.94* 3.57*   HGB 7.6* 9.4*   HCT 24.8* 29.8*   MCV 84 84   MCH 25.9* 26.3*   MCHC 30.6* 31.5   RDW 18.2* 17.3*    237       Last Basic Metabolic Panel:  Recent Labs   Lab Test 08/16/19  0759 07/22/19  0806 05/10/19  0736   NA  --  140 142   POTASSIUM  --  3.7 4.1   CHLORIDE  --  106 105   MARIN 9.6 9.0 9.1   CO2  --  28 32   BUN  --  32* 31*   CR  --  1.00 1.06   GLC  --  75 78       Liver Function Studies - No results for input(s): PROTTOTAL, ALBUMIN, BILITOTAL, ALKPHOS, AST, ALT, BILIDIRECT in the last 75197 hours.    TSH   Date Value Ref Range Status   03/15/2019 1.34 0.40 - 4.00 mU/L Final   ]      ASSESSMENT/PLAN:  Generalized muscle weakness  Possibly 2/2 infection or metabolic etiology; more likely UTI as this has bee his presentation in " the past.   Will draw stat labs and monitor for results; if worsening, send to ED for eval.     Obstruction, uropathy  Suprapubic catheter (H)  Possible urinary tract infection  Spastic hemiplegic cerebral palsy (H)  Frail elderly  Acute confusion  History of recurrent UTIs  Possible UTI despite being on Bactrim currently and Diflucan.    Urine today yellow, not cloudy, but sediment present.    Overall appearance of being ill with diaphoresis and chills, weakness and possible increase in spasticity with CP and infection.   If Labs unable to be obtained and patient continues to decline - would recommend sending to ED for eval.  Will hold off on Rocephin at this time as patient has been on Bactrim, but will monitor for lab results and UC results closely.      Dermatitis  Only new med to recently start was cetirizine.  Hold for now and change to PRN as may be contributory to confusion.    Can change cavilon lotion to Aquaphor for increased moisture.   Derm consult placed and [facility] setting up appt.   Patient has had steroid topical cream in the past but to avoid skin atrophy, do not recommend prolonged course of this.       Orders written by provider at facility and transcribed by : Magaly Polo MA  1.  Change Cetirizine to PRN for now.  2.  UC only.  Dx: recurrent UTIs, SPC placement, change in functional status  3.  Change Cavilon to Aquaphor ointment BID and BID PRN.  Dx: dermatitis  4.  stat labs: BMP, CBC w/diff.  Dx: confusion, recurrent UTIs, hypercalcemia, HTN      Electronically signed by:  ROCÍO Larsen CNP               Sincerely,        ROCÍO Larsen CNP

## 2019-10-10 NOTE — PROGRESS NOTES
[facility]    [place] Medical Record Number:  [id number]  [facility] [[id number]]    HPI:    Diallo Villarreal is a 86 year old  (8/31/1932), who is being seen today for a federally mandated E/M visit.  HPI information obtained from: facility staff, patient report and [facility] chart review    Today's concerns are:  - GNP reports Resident recently treated with UTI (recurrent); ongoing skin condition, not improving with meds, referred to [place].   -  - Resident seen and examined . Denies dysuria, suprapubic pain, fever, chills or blood in urine.   - Denies CP, edema, SOB..   - Reports sleep, appetite and BM are fine.   - RN has no concern today.   --------------------------------  - - Past Medical, social, family histories, medications, and allergies reviewed and updated  - Medications reviewed: in the chart and EHR.   - Case Management:   I have reviewed the care plan and MDS and do agree with the plan. Patient's desire to return to the community is not present.  Information reviewed:  Medications, vital signs, orders, and nursing notes.    MEDICATIONS:  Current Outpatient Medications   Medication Sig Dispense Refill     albuterol (PROVENTIL) (2.5 MG/3ML) 0.083% neb solution Take 2.5 mg by nebulization every 4 hours as needed for shortness of breath / dyspnea or wheezing       aspirin 81 MG EC tablet Take 81 mg by mouth daily        atorvastatin (LIPITOR) 40 MG tablet Take 40 mg by mouth daily       cetirizine (ZYRTEC) 10 MG tablet Take 10 mg by mouth daily as needed        cinacalcet (SENSIPAR) 30 MG tablet Take 30 mg by mouth daily       clotrimazole (LOTRIMIN) 1 % external cream Apply topically 2 times daily as needed        diphenhydrAMINE-zinc acetate (BENADRYL) 1-0.1 % external cream Apply topically 2 times daily as needed for itching       ferrous sulfate (FEROSUL) 325 (65 Fe) MG tablet Take 325 mg by mouth daily  (with breakfast)       furosemide (LASIX) 20 MG tablet Take 10 mg by mouth daily        metoprolol tartrate (LOPRESSOR) 50 MG tablet Take 50 mg by mouth 2 times daily       nitroGLYcerin (NITROSTAT) 0.4 MG sublingual tablet Place 0.4 mg under the tongue every 5 minutes as needed for chest pain For chest pain place 1 tablet under the tongue every 5 minutes for 3 doses. If symptoms persist 5 minutes after 1st dose call 911.       nystatin (MYCOSTATIN) 899621 UNIT/GM external powder Apply topically 2 times daily Apply 1 strip topically to affected area bid of groin for skin yeast infection.       pantoprazole (PROTONIX) 40 MG EC tablet Take 40 mg by mouth daily       triamcinolone (KENALOG) 0.147 MG/GM external aerosol Apply topically 2 times daily as needed        vitamin D3 (CHOLECALCIFEROL) 1000 units (25 mcg) tablet Take 2,000 Units by mouth daily       acetaminophen (TYLENOL) 500 MG tablet Take 2 tablets (1,000 mg) by mouth 3 times daily as needed for mild pain       mineral oil-hydrophilic petrolatum (AQUAPHOR) external ointment Apply topically 2 times daily Also BID PRN       ROS: 4 point ROS including Respiratory, CV, GI and , other than that noted in the HPI,  is negative    Exam:  Vitals: /78   Pulse 76   Temp 98.4  F (36.9  C)   Resp 17   Wt 91.1 kg (200 lb 12.8 oz)   SpO2 94%   BMI 25.78 kg/m    BMI= Body mass index is 25.78 kg/m .   GENERAL APPEARANCE:  in no distress, cooperative  RESP:  lungs clear to auscultation, no wheezing  CV:  S1S2 audible, irregular HR, no murmur appreciated.   ABDOMEN:  soft, NT/ND, BS audible. no mass appreciated on palpation.   M/S:   Kyphosis,   SKIN:  No rash.  SPC in place.   NEURO:   Strength 4/5 right lower extremity and left upper extremity, 5/5 on the left side  PSYCH:  Fair insight, judgement and memory, affect and mood normal       Lab/Diagnostic data: Reviewed with patient in  office    =========================================================  ASSESSMENT/PLAN  Spastic hemiplegic cerebral palsy (H)  Hx of Concussion without loss of consciousness  Frailty  - chronic right sided weakness,   - Significant  Deficits requiring NH placement. Requiring extensive assistance from nursing. Up for meals only o/w spends the day resting in bed  - continue supportive care.        Coronary atherosclerosis due to lipid rich plaque  S/P carotid endarterectomy  AAA w/o rupture: 6.7 cm  - at baseline. Continue to follow on Cardio/VS's recommendations.      Obstruction, uropathy  Chronic Indwelling Catheter:  Recurrent UTI  Supra pubic catheter  - on Cardura and flomax.       Cognitive impairment  - SLUM 11/15.  - Continue to anticipate needs. Chronic condition, ongoing decline expected.   -  Continue to provide redirection and reassurance as needed. Maintain safe living situation with goals focused on comfort.      Order: - See above, otherwise, continue the rest of the current POC.       Electronically signed by:  Ramírez Ibrahim MD

## 2019-10-10 NOTE — LETTER
10/10/2019        RE: Diallo Villarreal  [address]  [address]  [place] [address]        [facility]    [place] Medical Record Number:  [id number]  [facility] [[id number]]    HPI:    Diallo Villarreal is a 86 year old  (8/31/1932), who is being seen today for a federally mandated E/M visit.  HPI information obtained from: facility staff, patient report and [facility] chart review    Today's concerns are:  - GNP reports Resident recently treated with UTI (recurrent); ongoing skin condition, not improving with meds, referred to [place].   -  - Resident seen and examined . Denies dysuria, suprapubic pain, fever, chills or blood in urine.   - Denies CP, edema, SOB..   - Reports sleep, appetite and BM are fine.   - RN has no concern today.   --------------------------------  - - Past Medical, social, family histories, medications, and allergies reviewed and updated  - Medications reviewed: in the chart and EHR.   - Case Management:   I have reviewed the care plan and MDS and do agree with the plan. Patient's desire to return to the community is not present.  Information reviewed:  Medications, vital signs, orders, and nursing notes.    MEDICATIONS:  Current Outpatient Medications   Medication Sig Dispense Refill     albuterol (PROVENTIL) (2.5 MG/3ML) 0.083% neb solution Take 2.5 mg by nebulization every 4 hours as needed for shortness of breath / dyspnea or wheezing       aspirin 81 MG EC tablet Take 81 mg by mouth daily        atorvastatin (LIPITOR) 40 MG tablet Take 40 mg by mouth daily       cetirizine (ZYRTEC) 10 MG tablet Take 10 mg by mouth daily as needed        cinacalcet (SENSIPAR) 30 MG tablet Take 30 mg by mouth daily       clotrimazole (LOTRIMIN) 1 % external cream Apply topically 2 times daily as needed        diphenhydrAMINE-zinc acetate (BENADRYL) 1-0.1 % external cream Apply topically 2 times daily as needed  for itching       ferrous sulfate (FEROSUL) 325 (65 Fe) MG tablet Take 325 mg by mouth daily (with breakfast)       furosemide (LASIX) 20 MG tablet Take 10 mg by mouth daily        metoprolol tartrate (LOPRESSOR) 50 MG tablet Take 50 mg by mouth 2 times daily       nitroGLYcerin (NITROSTAT) 0.4 MG sublingual tablet Place 0.4 mg under the tongue every 5 minutes as needed for chest pain For chest pain place 1 tablet under the tongue every 5 minutes for 3 doses. If symptoms persist 5 minutes after 1st dose call 911.       nystatin (MYCOSTATIN) 915612 UNIT/GM external powder Apply topically 2 times daily Apply 1 strip topically to affected area bid of groin for skin yeast infection.       pantoprazole (PROTONIX) 40 MG EC tablet Take 40 mg by mouth daily       triamcinolone (KENALOG) 0.147 MG/GM external aerosol Apply topically 2 times daily as needed        vitamin D3 (CHOLECALCIFEROL) 1000 units (25 mcg) tablet Take 2,000 Units by mouth daily       acetaminophen (TYLENOL) 500 MG tablet Take 2 tablets (1,000 mg) by mouth 3 times daily as needed for mild pain       mineral oil-hydrophilic petrolatum (AQUAPHOR) external ointment Apply topically 2 times daily Also BID PRN       ROS: 4 point ROS including Respiratory, CV, GI and , other than that noted in the HPI,  is negative    Exam:  Vitals: /78   Pulse 76   Temp 98.4  F (36.9  C)   Resp 17   Wt 91.1 kg (200 lb 12.8 oz)   SpO2 94%   BMI 25.78 kg/m     BMI= Body mass index is 25.78 kg/m .   GENERAL APPEARANCE:  in no distress, cooperative  RESP:  lungs clear to auscultation, no wheezing  CV:  S1S2 audible, irregular HR, no murmur appreciated.   ABDOMEN:  soft, NT/ND, BS audible. no mass appreciated on palpation.   M/S:   Kyphosis,   SKIN:  No rash.  SPC in place.   NEURO:   Strength 4/5 right lower extremity and left upper extremity, 5/5 on the left side  PSYCH:  Fair insight, judgement and memory, affect and mood normal       Lab/Diagnostic data: Reviewed  with patient in office    =========================================================  ASSESSMENT/PLAN  Spastic hemiplegic cerebral palsy (H)  Hx of Concussion without loss of consciousness  Frailty  - chronic right sided weakness,   - Significant  Deficits requiring NH placement. Requiring extensive assistance from nursing. Up for meals only o/w spends the day resting in bed  - continue supportive care.        Coronary atherosclerosis due to lipid rich plaque  S/P carotid endarterectomy  AAA w/o rupture: 6.7 cm  - at baseline. Continue to follow on Cardio/VS's recommendations.      Obstruction, uropathy  Chronic Indwelling Catheter:  Recurrent UTI  Supra pubic catheter  - on Cardura and flomax.       Cognitive impairment  - SLUM 11/15.  - Continue to anticipate needs. Chronic condition, ongoing decline expected.   -  Continue to provide redirection and reassurance as needed. Maintain safe living situation with goals focused on comfort.      Order: - See above, otherwise, continue the rest of the current POC.       Electronically signed by:  Ramírez Ibrahim MD           Sincerely,        Ramírez Ibrahim MD

## 2019-10-15 NOTE — PROGRESS NOTES
"[place] [facility]  [place] Medical Record Number:  [id number]  Place of Service where encounter took place:  No question data found.  Chief Complaint   Patient presents with     Nursing Home Acute       HPI:    Diallo Villarreal  is a 87 year old (8/31/1932), who is being seen today for an episodic care visit.  HPI information obtained from: facility chart records, facility staff, patient report and [facility] chart review. Today's concern is:     Spastic hemiplegic cerebral palsy (H)  Pain of right lower extremity  Cognitive impairment  History of recurrent UTIs  Suprapubic catheter (H)     BIMS 10/15 indicating moderate impairment.   Patient has History of CP with spastic hemiplegia.      Nursing asking for patient to be visited today for increased RLE pain.      Patient met in his SNF room today where he reports RLE pain.  The pain he reports is \"achy\", intermittent and started about a week ago.    He reports he fell out of bed and then it started.    Nursing notes reviewed with nursing who reported 14 day ago lowering to floor with ambulation since that time.      Patient has vague reports, indicating various area of pain: quads initially, then lower leg radiating to quads, foot.    Patient unable to extend knee into straight leg - unknown baseline but seemingly much more than current level of ROM.      Past Medical and Surgical History reviewed in Epic today.    MEDICATIONS:  Current Outpatient Medications   Medication Sig Dispense Refill     acetaminophen (TYLENOL) 500 MG tablet Take 2 tablets (1,000 mg) by mouth 3 times daily as needed for mild pain       albuterol (PROVENTIL) (2.5 MG/3ML) 0.083% neb solution Take 2.5 mg by nebulization every 4 hours as needed for shortness of breath / dyspnea or wheezing       aspirin 81 MG EC tablet Take 81 mg by mouth daily        atorvastatin (LIPITOR) 40 MG tablet Take 40 mg by mouth daily       cetirizine (ZYRTEC) 10 MG tablet Take 10 mg by mouth daily as " "needed        cinacalcet (SENSIPAR) 30 MG tablet Take 30 mg by mouth daily       clotrimazole (LOTRIMIN) 1 % external cream Apply topically 2 times daily as needed        diphenhydrAMINE-zinc acetate (BENADRYL) 1-0.1 % external cream Apply topically 2 times daily as needed for itching       ferrous sulfate (FEROSUL) 325 (65 Fe) MG tablet Take 325 mg by mouth daily (with breakfast)       furosemide (LASIX) 20 MG tablet Take 10 mg by mouth daily        metoprolol tartrate (LOPRESSOR) 50 MG tablet Take 50 mg by mouth 2 times daily       mineral oil-hydrophilic petrolatum (AQUAPHOR) external ointment Apply topically 2 times daily Also BID PRN       nitroGLYcerin (NITROSTAT) 0.4 MG sublingual tablet Place 0.4 mg under the tongue every 5 minutes as needed for chest pain For chest pain place 1 tablet under the tongue every 5 minutes for 3 doses. If symptoms persist 5 minutes after 1st dose call 911.       nystatin (MYCOSTATIN) 221335 UNIT/GM external powder Apply topically 2 times daily Apply 1 strip topically to affected area bid of groin for skin yeast infection.       pantoprazole (PROTONIX) 40 MG EC tablet Take 40 mg by mouth daily       triamcinolone (KENALOG) 0.147 MG/GM external aerosol Apply topically 2 times daily as needed        vitamin D3 (CHOLECALCIFEROL) 1000 units (25 mcg) tablet Take 2,000 Units by mouth daily       REVIEW OF SYSTEMS:  Limited secondary to cognitive impairment but today pt reports 10 point ROS of systems including Constitutional, Eyes, Respiratory, Cardiovascular, Gastroenterology, Genitourinary, Integumentary, Musculoskeletal, Psychiatric were all negative except for pertinent positives noted in my HPI.    Objective:  /74   Pulse 81   Temp 96.3  F (35.7  C)   Resp 18   Ht 1.88 m (6' 2\")   Wt 91.1 kg (200 lb 12.8 oz)   SpO2 94%   BMI 25.78 kg/m    Exam:  GENERAL APPEARANCE:  Alert, in no distress  RESP:  respiratory effort normal, no respiratory distress  CV:  Mild LE " peripheral edema  ABDOMEN:  nondistended  M/S:   Gait and station with W/C for mobility, much reduced ROM of RLE with inability to straighten leg at knee, Digits and nails with spastic hemiplegia baseline of rightside, reduced muscle mass  SKIN:  Inspection and Palpation of skin and subcutaneous tissue with dermatitis present  PSYCH:  insight and judgement, memory with impairment, affect and mood terse at times, follows commands readily           Labs:   Labs done in SNF are in [facility]. Please refer to them using EPIC/Care Everywhere. and Recent labs in [facility] reviewed by me today.     ASSESSMENT/PLAN:     Spastic hemiplegic cerebral palsy (H)  Pain of right lower extremity  Cognitive impairment  History of recurrent UTIs  Suprapubic catheter (H)     Fracture from fall vs neuropathic pain vs MSK pain vs OA.    Can r/u fracture with xray.   Patient not currently on analgesia.  Recommended start of Tylenol; patient declined despite reports of intolerable pain.  Can order PRN Tylenol as patient currently has TING only.   Offered muscle relaxant; patient declined.   Discussed topical analgesia - patient more in agreement to this plan.  Can order Aspercreme scheduled and PRN.    Xray may also show OA of knee and can offer injection if showing arthritis-related pathology.      Patient seems uncharacteristically more confused today with inability to report much about pain.  He also seems more terse, which in the past has indicated infection.  Can check CBC for possible infection as patient has history of increased confusion with recurrent UTIs.     Will monitor for results and continued pain.     Orders written by provider at facility  1. CBC with Diff Dx: confusion, recurrent falls  2. Tylenol 1000 mg po TID PRN Dx:pain  3. Aspercreme topically to RLE BID and BID PRN Dx: pain  4. RLE Xray 3 view Dx: fall OOB, pain, reduced ROM.       Electronically signed by:  ROCÍO Larsen CNP

## 2019-10-15 NOTE — LETTER
"    10/15/2019        RE: Diallo Villarreal  [address]  [address]  [place] [address]        [facility]  [facility] Medical Record Number:  [id number]  Place of Service where encounter took place:  No question data found.  Chief Complaint   Patient presents with     Nursing Home Acute       HPI:    Diallo Villarreal  is a 87 year old (8/31/1932), who is being seen today for an episodic care visit.  HPI information obtained from: facility chart records, facility staff, patient report and [facility] chart review. Today's concern is:     Spastic hemiplegic cerebral palsy (H)  Pain of right lower extremity  Cognitive impairment  History of recurrent UTIs  Suprapubic catheter (H)     BIMS 10/15 indicating moderate impairment.   Patient has History of CP with spastic hemiplegia.      Nursing asking for patient to be visited today for increased RLE pain.      Patient met in his SNF room today where he reports RLE pain.  The pain he reports is \"achy\", intermittent and started about a week ago.    He reports he fell out of bed and then it started.    Nursing notes reviewed with nursing who reported 14 day ago lowering to floor with ambulation since that time.      Patient has vague reports, indicating various area of pain: quads initially, then lower leg radiating to quads, foot.    Patient unable to extend knee into straight leg - unknown baseline but seemingly much more than current level of ROM.      Past Medical and Surgical History reviewed in Epic today.    MEDICATIONS:  Current Outpatient Medications   Medication Sig Dispense Refill     acetaminophen (TYLENOL) 500 MG tablet Take 2 tablets (1,000 mg) by mouth 3 times daily as needed for mild pain       albuterol (PROVENTIL) (2.5 MG/3ML) 0.083% neb solution Take 2.5 mg by nebulization every 4 hours as needed for shortness of breath / dyspnea or wheezing       aspirin 81 MG EC tablet Take 81 mg by mouth daily        atorvastatin (LIPITOR) 40 MG tablet " "Take 40 mg by mouth daily       cetirizine (ZYRTEC) 10 MG tablet Take 10 mg by mouth daily as needed        cinacalcet (SENSIPAR) 30 MG tablet Take 30 mg by mouth daily       clotrimazole (LOTRIMIN) 1 % external cream Apply topically 2 times daily as needed        diphenhydrAMINE-zinc acetate (BENADRYL) 1-0.1 % external cream Apply topically 2 times daily as needed for itching       ferrous sulfate (FEROSUL) 325 (65 Fe) MG tablet Take 325 mg by mouth daily (with breakfast)       furosemide (LASIX) 20 MG tablet Take 10 mg by mouth daily        metoprolol tartrate (LOPRESSOR) 50 MG tablet Take 50 mg by mouth 2 times daily       mineral oil-hydrophilic petrolatum (AQUAPHOR) external ointment Apply topically 2 times daily Also BID PRN       nitroGLYcerin (NITROSTAT) 0.4 MG sublingual tablet Place 0.4 mg under the tongue every 5 minutes as needed for chest pain For chest pain place 1 tablet under the tongue every 5 minutes for 3 doses. If symptoms persist 5 minutes after 1st dose call 911.       nystatin (MYCOSTATIN) 100617 UNIT/GM external powder Apply topically 2 times daily Apply 1 strip topically to affected area bid of groin for skin yeast infection.       pantoprazole (PROTONIX) 40 MG EC tablet Take 40 mg by mouth daily       triamcinolone (KENALOG) 0.147 MG/GM external aerosol Apply topically 2 times daily as needed        vitamin D3 (CHOLECALCIFEROL) 1000 units (25 mcg) tablet Take 2,000 Units by mouth daily       REVIEW OF SYSTEMS:  Limited secondary to cognitive impairment but today pt reports 10 point ROS of systems including Constitutional, Eyes, Respiratory, Cardiovascular, Gastroenterology, Genitourinary, Integumentary, Musculoskeletal, Psychiatric were all negative except for pertinent positives noted in my HPI.    Objective:  /74   Pulse 81   Temp 96.3  F (35.7  C)   Resp 18   Ht 1.88 m (6' 2\")   Wt 91.1 kg (200 lb 12.8 oz)   SpO2 94%   BMI 25.78 kg/m     Exam:  GENERAL APPEARANCE:  Alert, in " no distress  RESP:  respiratory effort normal, no respiratory distress  CV:  Mild LE peripheral edema  ABDOMEN:  nondistended  M/S:   Gait and station with W/C for mobility, much reduced ROM of RLE with inability to straighten leg at knee, Digits and nails with spastic hemiplegia baseline of rightside, reduced muscle mass  SKIN:  Inspection and Palpation of skin and subcutaneous tissue with dermatitis present  PSYCH:  insight and judgement, memory with impairment, affect and mood terse at times, follows commands readily           Labs:   Labs done in SNF are in [facility]. Please refer to them using EPIC/Care Everywhere. and Recent labs in [facility] reviewed by me today.     ASSESSMENT/PLAN:     Spastic hemiplegic cerebral palsy (H)  Pain of right lower extremity  Cognitive impairment  History of recurrent UTIs  Suprapubic catheter (H)     Fracture from fall vs neuropathic pain vs MSK pain vs OA.    Can r/u fracture with xray.   Patient not currently on analgesia.  Recommended start of Tylenol; patient declined despite reports of intolerable pain.  Can order PRN Tylenol as patient currently has TING only.   Offered muscle relaxant; patient declined.   Discussed topical analgesia - patient more in agreement to this plan.  Can order Aspercreme scheduled and PRN.    Xray may also show OA of knee and can offer injection if showing arthritis-related pathology.      Patient seems uncharacteristically more confused today with inability to report much about pain.  He also seems more terse, which in the past has indicated infection.  Can check CBC for possible infection as patient has history of increased confusion with recurrent UTIs.     Will monitor for results and continued pain.     Orders written by provider at facility  1. CBC with Diff Dx: confusion, recurrent falls  2. Tylenol 1000 mg po TID PRN Dx:pain  3. Aspercreme topically to RLE BID and BID PRN Dx: pain  4. RLE Xray 3 view Dx: fall OOB, pain, reduced ROM.        Electronically signed by:  ROCÍO Larsen CNP               Sincerely,        ROCÍO Larsen CNP

## 2019-10-29 NOTE — LETTER
10/29/2019        RE: Diallo Villarreal  [address]  [address]  [place] [address]        [facility]  [facility] Record Number:  [id number]  Place of Service where encounter took place:  No question data found.  Chief Complaint   Patient presents with     Nursing Home Acute       HPI:    Diallo Villarreal  is a 87 year old (8/31/1932), who is being seen today for an episodic care visit.  HPI information obtained from: facility chart records, facility staff, patient report and [facility] chart review. Today's concern is:     Boil  Hx MRSA infection  Atopic dermatitis, unspecified type  Cellulitis of left upper extremity     Patient has long history of dermatitis with possible eczema; has upcoming dermatology appt on 11/8/19.  Nursing has changed detergent to fragrance Dreft and patient has topical treatments including steroid cream.  Nursing now reporting left FA boil that unearthed yesterday but asking to look at it today.    Today patient is visited in his SNF room with the nurse who notes the boil has worsened.  It is now indurated with clear purulent drainage at top and notable swelling and stiffness around head of boil.    Patient reports mild pain.  LEs also with continues dermatitis, L>R, also PVD changes.      Past Medical and Surgical History reviewed in Epic today.    MEDICATIONS:  Current Outpatient Medications   Medication Sig Dispense Refill     acetaminophen (TYLENOL) 500 MG tablet Take 2 tablets (1,000 mg) by mouth 3 times daily as needed for mild pain       albuterol (PROVENTIL) (2.5 MG/3ML) 0.083% neb solution Take 2.5 mg by nebulization every 4 hours as needed for shortness of breath / dyspnea or wheezing       aspirin 81 MG EC tablet Take 81 mg by mouth daily        atorvastatin (LIPITOR) 40 MG tablet Take 40 mg by mouth daily       cetirizine (ZYRTEC) 10 MG tablet Take 10 mg by mouth daily as needed        cinacalcet (SENSIPAR) 30 MG tablet Take 30 mg by mouth daily    "    clotrimazole (LOTRIMIN) 1 % external cream Apply topically 2 times daily as needed        diphenhydrAMINE-zinc acetate (BENADRYL) 1-0.1 % external cream Apply topically 2 times daily as needed for itching       ferrous sulfate (FEROSUL) 325 (65 Fe) MG tablet Take 325 mg by mouth daily (with breakfast)       furosemide (LASIX) 20 MG tablet Take 10 mg by mouth daily        metoprolol tartrate (LOPRESSOR) 50 MG tablet Take 50 mg by mouth 2 times daily       mineral oil-hydrophilic petrolatum (AQUAPHOR) external ointment Apply topically 2 times daily Also BID PRN       nitroGLYcerin (NITROSTAT) 0.4 MG sublingual tablet Place 0.4 mg under the tongue every 5 minutes as needed for chest pain For chest pain place 1 tablet under the tongue every 5 minutes for 3 doses. If symptoms persist 5 minutes after 1st dose call 911.       nystatin (MYCOSTATIN) 277404 UNIT/GM external powder Apply topically 2 times daily Apply 1 strip topically to affected area bid of groin for skin yeast infection.       pantoprazole (PROTONIX) 40 MG EC tablet Take 40 mg by mouth daily       triamcinolone (KENALOG) 0.147 MG/GM external aerosol Apply topically 2 times daily as needed        vitamin D3 (CHOLECALCIFEROL) 1000 units (25 mcg) tablet Take 2,000 Units by mouth daily       REVIEW OF SYSTEMS:  Limited secondary to cognitive impairment but today pt reports 4 point ROS including Respiratory, CV, GI and , other than that noted in the HPI,  is negative    Objective:  BP (!) 141/79   Pulse 86   Temp 97.3  F (36.3  C)   Resp 20   Ht 1.88 m (6' 2\")   Wt 93.6 kg (206 lb 6.4 oz)   SpO2 98%   BMI 26.50 kg/m     Exam:  GENERAL APPEARANCE:  Alert, in no distress  RESP:  respiratory effort normal, no respiratory distress  CV:  Scant to mild peripheral edema  ABDOMEN:  nondistended  M/S:   Gait and station with w/c for mobilty, has spastic hemiplegia from CP, Digits and nails with arthritic changes, reduced muscle mass  SKIN:  Inspection and " Palpation of skin and subcutaneous tissue with left FA boil that is now fluid-filled and indurated with purulent drainage  PSYCH:  insight and judgement, memory with impairment, affect and mood normal, follows commands readily           Labs:   Labs done in SNF are in [place] EPIC. Please refer to them using EPIC/Care Everywhere.    ASSESSMENT/PLAN:     Boil  Hx MRSA infection  Atopic dermatitis, unspecified type  Cellulitis of left upper extremity     Will order doxycycline for possible MRSA + infection.  11/8/19 dermatology appt upcoming.    Will need for monitoring as possible lancing of boil may be beneficial for expression of drainage/infection.          Orders written by provider at facility  1. Doxycycline 100 mg po BID x 7 days.Dx: purulent cellulitis.       Electronically signed by:  ROCÍO Larsen CNP               Sincerely,        ROCÍO Larsen CNP

## 2019-10-29 NOTE — PROGRESS NOTES
[facility]  [facility] Medical Record Number:  [id number]  Place of Service where encounter took place:  No question data found.  Chief Complaint   Patient presents with     Nursing Home Acute       HPI:    Diallo Villarreal  is a 87 year old (8/31/1932), who is being seen today for an episodic care visit.  HPI information obtained from: facility chart records, facility staff, patient report and [facility] chart review. Today's concern is:     Boil  Hx MRSA infection  Atopic dermatitis, unspecified type  Cellulitis of left upper extremity     Patient has long history of dermatitis with possible eczema; has upcoming dermatology appt on 11/8/19.  Nursing has changed detergent to fragrance Dreft and patient has topical treatments including steroid cream.  Nursing now reporting left FA boil that unearthed yesterday but asking to look at it today.    Today patient is visited in his SNF room with the nurse who notes the boil has worsened.  It is now indurated with clear purulent drainage at top and notable swelling and stiffness around head of boil.    Patient reports mild pain.  LEs also with continues dermatitis, L>R, also PVD changes.      Past Medical and Surgical History reviewed in Epic today.    MEDICATIONS:  Current Outpatient Medications   Medication Sig Dispense Refill     acetaminophen (TYLENOL) 500 MG tablet Take 2 tablets (1,000 mg) by mouth 3 times daily as needed for mild pain       albuterol (PROVENTIL) (2.5 MG/3ML) 0.083% neb solution Take 2.5 mg by nebulization every 4 hours as needed for shortness of breath / dyspnea or wheezing       aspirin 81 MG EC tablet Take 81 mg by mouth daily        atorvastatin (LIPITOR) 40 MG tablet Take 40 mg by mouth daily       cetirizine (ZYRTEC) 10 MG tablet Take 10 mg by mouth daily as needed        cinacalcet (SENSIPAR) 30 MG tablet Take 30 mg by mouth daily       clotrimazole (LOTRIMIN) 1 % external cream Apply topically 2 times daily as needed         "diphenhydrAMINE-zinc acetate (BENADRYL) 1-0.1 % external cream Apply topically 2 times daily as needed for itching       ferrous sulfate (FEROSUL) 325 (65 Fe) MG tablet Take 325 mg by mouth daily (with breakfast)       furosemide (LASIX) 20 MG tablet Take 10 mg by mouth daily        metoprolol tartrate (LOPRESSOR) 50 MG tablet Take 50 mg by mouth 2 times daily       mineral oil-hydrophilic petrolatum (AQUAPHOR) external ointment Apply topically 2 times daily Also BID PRN       nitroGLYcerin (NITROSTAT) 0.4 MG sublingual tablet Place 0.4 mg under the tongue every 5 minutes as needed for chest pain For chest pain place 1 tablet under the tongue every 5 minutes for 3 doses. If symptoms persist 5 minutes after 1st dose call 911.       nystatin (MYCOSTATIN) 390733 UNIT/GM external powder Apply topically 2 times daily Apply 1 strip topically to affected area bid of groin for skin yeast infection.       pantoprazole (PROTONIX) 40 MG EC tablet Take 40 mg by mouth daily       triamcinolone (KENALOG) 0.147 MG/GM external aerosol Apply topically 2 times daily as needed        vitamin D3 (CHOLECALCIFEROL) 1000 units (25 mcg) tablet Take 2,000 Units by mouth daily       REVIEW OF SYSTEMS:  Limited secondary to cognitive impairment but today pt reports 4 point ROS including Respiratory, CV, GI and , other than that noted in the HPI,  is negative    Objective:  BP (!) 141/79   Pulse 86   Temp 97.3  F (36.3  C)   Resp 20   Ht 1.88 m (6' 2\")   Wt 93.6 kg (206 lb 6.4 oz)   SpO2 98%   BMI 26.50 kg/m    Exam:  GENERAL APPEARANCE:  Alert, in no distress  RESP:  respiratory effort normal, no respiratory distress  CV:  Scant to mild peripheral edema  ABDOMEN:  nondistended  M/S:   Gait and station with w/c for mobilty, has spastic hemiplegia from CP, Digits and nails with arthritic changes, reduced muscle mass  SKIN:  Inspection and Palpation of skin and subcutaneous tissue with left FA boil that is now fluid-filled and indurated " with purulent drainage  PSYCH:  insight and judgement, memory with impairment, affect and mood normal, follows commands readily           Labs:   Labs done in SNF are in [place] EPIC. Please refer to them using [employer]/Care Everywhere.    ASSESSMENT/PLAN:     Boil  Hx MRSA infection  Atopic dermatitis, unspecified type  Cellulitis of left upper extremity     Will order doxycycline for possible MRSA + infection.  11/8/19 dermatology appt upcoming.    Will need for monitoring as possible lancing of boil may be beneficial for expression of drainage/infection.          Orders written by provider at facility  1. Doxycycline 100 mg po BID x 7 days.Dx: purulent cellulitis.       Electronically signed by:  ROCÍO Larsen CNP

## 2019-11-05 NOTE — PROGRESS NOTES
[facility]  [facility] Medical Record Number:  [id number]  Place of Service where encounter took place:  [facility] [[id number]]  Chief Complaint   Patient presents with     Nursing Home Acute       HPI:    Diallo Villarreal  is a 87 year old (8/31/1932), who is being seen today for an episodic care visit.  HPI information obtained from: facility chart records, facility staff, patient report and [facility] chart review. Today's concern is:     Boil  Hx MRSA infection  Atopic dermatitis, unspecified type  Cellulitis of left upper extremity  Spastic hemiplegic cerebral palsy (H)  Hematuria, unspecified type  Suprapubic catheter (H)     Please see 10/29/19 visit for details.  Patient was treated for MRSA + infection of LUE boil at that tinme.  Patient now presents with worsening boil on left forearm.  Nursing reports:  11/03/2019 22:27   Residents boil on left arm swollen red and painful, hot pack applied. Area covered with Mepilex. Currently on ABX for it.     Patient met today in his room, dressing taken down and noted purulent drainage with redness, swelling, pain at boil site.  Boil as 2 open areas from which drainage is expelling.    Patient reports tenderness/pain.    He otherwise denies SOB, fever/chills, CP, HA, lightheadedness, upset stomach, constipation nor diarrhea.  He has an indwelling SPC but denies pain.  Nursing has reported hematuria and ASA has been held. No antibiotics ordered as patient has been on doxycycline, starting 10/29/19.     Past Medical and Surgical History reviewed in Epic today.    MEDICATIONS:  Current Outpatient Medications   Medication Sig Dispense Refill     acetaminophen (TYLENOL) 500 MG tablet Take 2 tablets (1,000 mg) by mouth 3 times daily as needed for mild pain       albuterol (PROVENTIL) (2.5 MG/3ML) 0.083% neb solution Take 2.5 mg by nebulization every 4 hours as needed for shortness of breath / dyspnea or wheezing       aspirin 81  MG EC tablet Take 81 mg by mouth daily        atorvastatin (LIPITOR) 40 MG tablet Take 40 mg by mouth daily       cetirizine (ZYRTEC) 10 MG tablet Take 10 mg by mouth daily as needed        cinacalcet (SENSIPAR) 30 MG tablet Take 30 mg by mouth daily       clotrimazole (LOTRIMIN) 1 % external cream Apply topically 2 times daily as needed        diphenhydrAMINE-zinc acetate (BENADRYL) 1-0.1 % external cream Apply topically 2 times daily as needed for itching       ferrous sulfate (FEROSUL) 325 (65 Fe) MG tablet Take 325 mg by mouth daily (with breakfast)       furosemide (LASIX) 20 MG tablet Take 10 mg by mouth daily        metoprolol tartrate (LOPRESSOR) 50 MG tablet Take 50 mg by mouth 2 times daily       mineral oil-hydrophilic petrolatum (AQUAPHOR) external ointment Apply topically 2 times daily Also BID PRN       nitroGLYcerin (NITROSTAT) 0.4 MG sublingual tablet Place 0.4 mg under the tongue every 5 minutes as needed for chest pain For chest pain place 1 tablet under the tongue every 5 minutes for 3 doses. If symptoms persist 5 minutes after 1st dose call 911.       nystatin (MYCOSTATIN) 486927 UNIT/GM external powder Apply topically 2 times daily Apply 1 strip topically to affected area bid of groin for skin yeast infection.       pantoprazole (PROTONIX) 40 MG EC tablet Take 40 mg by mouth daily       sulfamethoxazole-trimethoprim (BACTRIM DS/SEPTRA DS) 800-160 MG tablet Take 1 tablet by mouth 2 times daily       triamcinolone (KENALOG) 0.147 MG/GM external aerosol Apply topically 2 times daily as needed        vitamin D3 (CHOLECALCIFEROL) 1000 units (25 mcg) tablet Take 2,000 Units by mouth daily       REVIEW OF SYSTEMS:  10 point ROS of systems including Constitutional, Eyes, Respiratory, Cardiovascular, Gastroenterology, Genitourinary, Integumentary, Musculoskeletal, Psychiatric were all negative except for pertinent positives noted in my HPI.    Objective:  /65   Pulse 84   Temp 96.8  F (36  C)    "Resp 20   Ht 1.88 m (6' 2\")   Wt 96.3 kg (212 lb 6.4 oz)   SpO2 98%   BMI 27.27 kg/m    Exam:  GENERAL APPEARANCE:  Alert, in no distress, pleasant, in pain with procedure  RESP:  respiratory effort normal, no respiratory distress  CV:  Scant LE peripheral edema  ABDOMEN:  nondistended  M/S:   Gait and station with spastic hemiplegia, W/C for mobility, Digits and nails with arthritic changes, reduced muscle mass  SKIN:  Inspection and Palpation of skin and subcutaneous tissue atopic dermatitis present but improved  PSYCH:  insight and judgement, memory with mild impairment , affect and mood normal, follows commands readily           Labs:   CBC RESULTS:   Recent Labs   Lab Test 10/16/19  0832 10/01/19  1340   WBC 7.3 7.3   RBC 3.48* 3.50*   HGB 9.8* 9.8*   HCT 30.1* 30.3*   MCV 87 87   MCH 28.2 28.0   MCHC 32.6 32.3   RDW 17.5* 17.7*    229       Last Basic Metabolic Panel:  Recent Labs   Lab Test 10/01/19  1340 08/16/19  0759 07/22/19  0806   *  --  140   POTASSIUM 4.3  --  3.7   CHLORIDE 111*  --  106   MARIN 9.4 9.6 9.0   CO2 25  --  28   BUN 21  --  32*   CR 1.38*  --  1.00   GLC 83  --  75       Liver Function Studies - No results for input(s): PROTTOTAL, ALBUMIN, BILITOTAL, ALKPHOS, AST, ALT, BILIDIRECT in the last 92398 hours.    TSH   Date Value Ref Range Status   03/15/2019 1.34 0.40 - 4.00 mU/L Final   ]    ASSESSMENT/PLAN:     Boil  Hx MRSA infection  Atopic dermatitis, unspecified type  Cellulitis of left upper extremity  Spastic hemiplegic cerebral palsy (H)  Hematuria, unspecified type  Suprapubic catheter (H)     Area cleaned with alcohol and local numbing with topical lidocaine.  Boil expressed through existing open areas and significant brown purulent drainage returned/removed.  Lower pocketed area so very small incision made with scalpel and more brown purulent drainage expressed.  Area bleeding mildly.  3 total open areas present at finish of procedure (2 previously auto-opened, 1 " made with scalpel).  Scant purulent drainage still able to be expressed but to much less degree and patient reported pain from expression so this was stopped.  Can order antibiotics for continued coverage and monitor for worsening/resolution.    Patient does f/u with Dermatology on 11/8/19.      Orders written by provider at facility and transcribed by : Magaly Polo MA  1.  Bactrim DS 1 tablet po BID x 5 days.  Update NP in 5 days w/status of left arm wound.  Dx: cellulitis, purulent       Electronically signed by:  ROCÍO Larsen CNP

## 2019-11-05 NOTE — LETTER
11/5/2019        RE: Diallo Villarreal  [address]  [address]  [place] [address]        [facility]  [facility] Record Number:  [id number]  Place of Service where encounter took place:  [facility] ([facility]) [[id number]]  Chief Complaint   Patient presents with     Nursing Home Acute       HPI:    Diallo Villarreal  is a 87 year old (8/31/1932), who is being seen today for an episodic care visit.  HPI information obtained from: facility chart records, facility staff, patient report and [facility] chart review. Today's concern is:     Boil  Hx MRSA infection  Atopic dermatitis, unspecified type  Cellulitis of left upper extremity  Spastic hemiplegic cerebral palsy (H)  Hematuria, unspecified type  Suprapubic catheter (H)     Please see 10/29/19 visit for details.  Patient was treated for MRSA + infection of LUE boil at that tinme.  Patient now presents with worsening boil on left forearm.  Nursing reports:  11/03/2019 22:27   Residents boil on left arm swollen red and painful, hot pack applied. Area covered with Mepilex. Currently on ABX for it.     Patient met today in his room, dressing taken down and noted purulent drainage with redness, swelling, pain at boil site.  Boil as 2 open areas from which drainage is expelling.    Patient reports tenderness/pain.    He otherwise denies SOB, fever/chills, CP, HA, lightheadedness, upset stomach, constipation nor diarrhea.  He has an indwelling SPC but denies pain.  Nursing has reported hematuria and ASA has been held. No antibiotics ordered as patient has been on doxycycline, starting 10/29/19.     Past Medical and Surgical History reviewed in Epic today.    MEDICATIONS:  Current Outpatient Medications   Medication Sig Dispense Refill     acetaminophen (TYLENOL) 500 MG tablet Take 2 tablets (1,000 mg) by mouth 3 times daily as needed for mild pain       albuterol (PROVENTIL) (2.5 MG/3ML) 0.083% neb solution Take 2.5 mg by  nebulization every 4 hours as needed for shortness of breath / dyspnea or wheezing       aspirin 81 MG EC tablet Take 81 mg by mouth daily        atorvastatin (LIPITOR) 40 MG tablet Take 40 mg by mouth daily       cetirizine (ZYRTEC) 10 MG tablet Take 10 mg by mouth daily as needed        cinacalcet (SENSIPAR) 30 MG tablet Take 30 mg by mouth daily       clotrimazole (LOTRIMIN) 1 % external cream Apply topically 2 times daily as needed        diphenhydrAMINE-zinc acetate (BENADRYL) 1-0.1 % external cream Apply topically 2 times daily as needed for itching       ferrous sulfate (FEROSUL) 325 (65 Fe) MG tablet Take 325 mg by mouth daily (with breakfast)       furosemide (LASIX) 20 MG tablet Take 10 mg by mouth daily        metoprolol tartrate (LOPRESSOR) 50 MG tablet Take 50 mg by mouth 2 times daily       mineral oil-hydrophilic petrolatum (AQUAPHOR) external ointment Apply topically 2 times daily Also BID PRN       nitroGLYcerin (NITROSTAT) 0.4 MG sublingual tablet Place 0.4 mg under the tongue every 5 minutes as needed for chest pain For chest pain place 1 tablet under the tongue every 5 minutes for 3 doses. If symptoms persist 5 minutes after 1st dose call 911.       nystatin (MYCOSTATIN) 140264 UNIT/GM external powder Apply topically 2 times daily Apply 1 strip topically to affected area bid of groin for skin yeast infection.       pantoprazole (PROTONIX) 40 MG EC tablet Take 40 mg by mouth daily       sulfamethoxazole-trimethoprim (BACTRIM DS/SEPTRA DS) 800-160 MG tablet Take 1 tablet by mouth 2 times daily       triamcinolone (KENALOG) 0.147 MG/GM external aerosol Apply topically 2 times daily as needed        vitamin D3 (CHOLECALCIFEROL) 1000 units (25 mcg) tablet Take 2,000 Units by mouth daily       REVIEW OF SYSTEMS:  10 point ROS of systems including Constitutional, Eyes, Respiratory, Cardiovascular, Gastroenterology, Genitourinary, Integumentary, Musculoskeletal, Psychiatric were all negative except for  "pertinent positives noted in my HPI.    Objective:  /65   Pulse 84   Temp 96.8  F (36  C)   Resp 20   Ht 1.88 m (6' 2\")   Wt 96.3 kg (212 lb 6.4 oz)   SpO2 98%   BMI 27.27 kg/m     Exam:  GENERAL APPEARANCE:  Alert, in no distress, pleasant, in pain with procedure  RESP:  respiratory effort normal, no respiratory distress  CV:  Scant LE peripheral edema  ABDOMEN:  nondistended  M/S:   Gait and station with spastic hemiplegia, W/C for mobility, Digits and nails with arthritic changes, reduced muscle mass  SKIN:  Inspection and Palpation of skin and subcutaneous tissue atopic dermatitis present but improved  PSYCH:  insight and judgement, memory with mild impairment , affect and mood normal, follows commands readily           Labs:   CBC RESULTS:   Recent Labs   Lab Test 10/16/19  0832 10/01/19  1340   WBC 7.3 7.3   RBC 3.48* 3.50*   HGB 9.8* 9.8*   HCT 30.1* 30.3*   MCV 87 87   MCH 28.2 28.0   MCHC 32.6 32.3   RDW 17.5* 17.7*    229       Last Basic Metabolic Panel:  Recent Labs   Lab Test 10/01/19  1340 08/16/19  0759 07/22/19  0806   *  --  140   POTASSIUM 4.3  --  3.7   CHLORIDE 111*  --  106   MARIN 9.4 9.6 9.0   CO2 25  --  28   BUN 21  --  32*   CR 1.38*  --  1.00   GLC 83  --  75       Liver Function Studies - No results for input(s): PROTTOTAL, ALBUMIN, BILITOTAL, ALKPHOS, AST, ALT, BILIDIRECT in the last 53555 hours.    TSH   Date Value Ref Range Status   03/15/2019 1.34 0.40 - 4.00 mU/L Final   ]    ASSESSMENT/PLAN:     Boil  Hx MRSA infection  Atopic dermatitis, unspecified type  Cellulitis of left upper extremity  Spastic hemiplegic cerebral palsy (H)  Hematuria, unspecified type  Suprapubic catheter (H)     Area cleaned with alcohol and local numbing with topical lidocaine.  Boil expressed through existing open areas and significant brown purulent drainage returned/removed.  Lower pocketed area so very small incision made with scalpel and more brown purulent drainage expressed.  " Area bleeding mildly.  3 total open areas present at finish of procedure (2 previously auto-opened, 1 made with scalpel).  Scant purulent drainage still able to be expressed but to much less degree and patient reported pain from expression so this was stopped.  Can order antibiotics for continued coverage and monitor for worsening/resolution.    Patient does f/u with Dermatology on 11/8/19.      Orders written by provider at facility and transcribed by : Magaly Polo MA  1.  Bactrim DS 1 tablet po BID x 5 days.  Update NP in 5 days w/status of left arm wound.  Dx: cellulitis, purulent       Electronically signed by:  ROCÍO Larsen CNP               Sincerely,        ROCÍO Larsen CNP

## 2019-11-15 NOTE — PROGRESS NOTES
Consult requested by Jennifer Bryant    Reason for consultation - hypergranulation suprapubic catheter.      HPI:   Patient is an 87-year-old white male with CP who is in a wheelchair who had a suprapubic catheter placed many years ago for obstructive uropathy.  Recently the wound nurses noted some hyper granulation and bleeding around the base of the catheter which I am now asked to see him.  Currently he is resting comfortably in a wheelchair.    Past Medical History:   Diagnosis Date     AAA (abdominal aortic aneurysm) (H)      Celiac sprue      HTN (hypertension)      Past Surgical History:   Procedure Laterality Date     LEFT CAROTID ENDARTERECTOMY       TONSILLECTOMY       Current Outpatient Medications   Medication     acetaminophen (TYLENOL) 500 MG tablet     albuterol (PROVENTIL) (2.5 MG/3ML) 0.083% neb solution     aspirin 81 MG EC tablet     atorvastatin (LIPITOR) 40 MG tablet     cetirizine (ZYRTEC) 10 MG tablet     cinacalcet (SENSIPAR) 30 MG tablet     ferrous sulfate (FEROSUL) 325 (65 Fe) MG tablet     furosemide (LASIX) 20 MG tablet     lidocaine (LMX4) 4 % external cream     metoprolol tartrate (LOPRESSOR) 50 MG tablet     mometasone (ELOCON) 0.1 % external ointment     nitroGLYcerin (NITROSTAT) 0.4 MG sublingual tablet     pantoprazole (PROTONIX) 40 MG EC tablet     rifampin (RIFADIN) 300 MG capsule     vitamin D3 (CHOLECALCIFEROL) 1000 units (25 mcg) tablet     No current facility-administered medications for this visit.         Allergies   Allergen Reactions     Gluten Meal Other (See Comments)     Celiac disease     Wheat Extract Other (See Comments)     Social History     Socioeconomic History     Marital status: Single     Spouse name: Not on file     Number of children: Not on file     Years of education: Not on file     Highest education level: Not on file   Occupational History     Not on file   Social Needs     Financial resource strain: Not on file     Food insecurity:     Worry: Not on  file     Inability: Not on file     Transportation needs:     Medical: Not on file     Non-medical: Not on file   Tobacco Use     Smoking status: Former Smoker     Smokeless tobacco: Never Used   Substance and Sexual Activity     Alcohol use: No     Frequency: Never     Drug use: Not on file     Sexual activity: Not on file   Lifestyle     Physical activity:     Days per week: Not on file     Minutes per session: Not on file     Stress: Not on file   Relationships     Social connections:     Talks on phone: Not on file     Gets together: Not on file     Attends Orthodox service: Not on file     Active member of club or organization: Not on file     Attends meetings of clubs or organizations: Not on file     Relationship status: Not on file     Intimate partner violence:     Fear of current or ex partner: Not on file     Emotionally abused: Not on file     Physically abused: Not on file     Forced sexual activity: Not on file   Other Topics Concern     Not on file   Social History Narrative     Not on file     No family history on file.     ROS: 10 point ROS neg other than the symptoms noted above in the HPI.    PE:  B/P: Data Unavailable, T: Data Unavailable, P: Data Unavailable, R: Data Unavailable  General: well developed, well nourished WM who appear his stated age - in a wheel chair  HEENT: NC/AT, EOMI, (-)icterus, (-)injection  Neck: Supple, No JVD  Chest: CTA  Abd: Soft, non tender, non distended, non tender, no masses, suprapubic catheter in place with hypergranulation - AGNO4 applied.  Ext; Warm, no edema  Psych: AAOx3  Neuro: No focal deficits    Impression/plan:  This is a 87-year-old gentleman with a suprapubic catheter and hypergranulation tissue from the catheter around the tube entry site.  Then plan at this time is just to apply silver nitrate to the area as necessary.  This was done today.  The area can be then covered with gauze.  He may require repeat treatments should the granulation tissue not  resolve.  I will follow-up with him on my next visit to the nursing home.    Rasta Bliss MD, FACS

## 2019-11-15 NOTE — LETTER
"    11/15/2019        RE: Diallo Villarreal  [address]  [address]  [place] [address]        [facility]  Chief Complaint   Patient presents with     [facility] Regulatory     [facility] Record Number:  [id number]  Place of Service where encounter took place:  [facility] ([facility]) [[id number]]    HPI:    Diallo Villarreal  is 87 year old (8/31/1932), who is being seen today for a federally mandated E/M visit.  HPI information obtained from: facility chart records, facility staff, patient report and [facility] chart review. 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    Previously lived in a group home but moved to SNF LTC after TCU stay for increased care needs    Patient today talking about, \"When I leave here...\"  We discussed this may be long term but he was unsure why that would be.    He reports he has lost muscle mass in right side.      CKD3  Last few BMPs:   6/10/19: BUN 31, Creat 1.06, GFR 63  7/22/19: BUN 32, Creat 1.00, GFR 67  10/1/19: BUN 21, Creat 1.38, GFR 46    Patient is on Lasix 10 mg daily, no ACEI      Suprapubic catheter (H)  History of recurrent UTIs  Obstruction, uropathy  Patient has history of obstructive uropathy with urinary retention, had indwelling catheter and now has transitioned to SPC (7/25/19) d/t frequent UTIs and discomfort.  SPC placement by Dr Washington, [facility]  Has had several issues with cellulitis at SPC site, treated with antibiotics.    Patient reports no issues with SPC site currently.  Nursing reports Wound MD has visited patient a few times for treatment of proud flesh near site.        Essential hypertension  Coronary atherosclerosis due to lipid rich plaque  S/P carotid endarterectomy  Abdominal aortic aneurysm (AAA) without rupture (H)  Per epic " "note/HX:  F/b [facility], [place], operated by [facility], Dr Satinder Bowman - cardiology, Dr Enrique - Vascular  Per Care Everywhere notes:  1. Coronary artery disease found on abnormal stress test.    9/7/2018 status post atherectomy angioplasty and stenting of the mid LAD.    9/17/2018 angioplasty and stenting of the proximal, mid and distal right coronary artery.   2. AAA measuring 6.4 cm, not amenable to endovascular therapy, follows with Dr. Reyes Enrique, Vascular Surgery.  3. Carotid artery disease status post left CEA in 2002.  4. Hypertension.  5. Hyperlipidemia.     3/2/19 CT Abdomen/Pel noting 6.7 cm AAA  Vascular notes indicate need for open repair of AAA so patient was then sent to Cardiology for surgery clearance.  Stress test was abnormal and was f/b angiogram with stenting in mid LAD followed by staged procedure of RCA at a later date. Ultimately patient elected not to have open repair and follow with surveillance U/S.      Is on ASA 81 gm daily indefinitely  He is also on atorvastatin 40 mg q HS, Lasix 10 mg, metoprolol 50 mg BID, nitroglycerin prn    BPs 120-130s/40-60s  (however 82/42 x 1)  HRs 70-80s  Patient denies SOB, CP, HA, lightheadedness    Patient reports he has f/u visit on 11/20/19 with Cardiology for AAA.       Cognitive impairment  BIMS 10  Resides in SNF now d/t increased care needs     Boil  Hx MRSA infection  Patient initially treated with doxycycline, then Bactrim which was DC'd when patient saw Dermatology, who ordered steroid cream for dermatitis and Linezolid for MRSA infection.  Unfortunately patient's insurance did not cover Linezolid and sensitivities showing susceptibility to rifampin, which was ordered.  Patient reports boil area \"itches\" but otherwise no pain.      ALLERGIES:Gluten meal and Wheat extract  PAST MEDICAL HISTORY:   has a past medical history of AAA (abdominal aortic aneurysm) (H), Celiac sprue, and HTN (hypertension).  PAST SURGICAL HISTORY:   has a past surgical " history that includes tonsillectomy and LEFT CAROTID ENDARTERECTOMY.  FAMILY HISTORY: family history is not on file.  SOCIAL HISTORY:  reports that he has quit smoking. He has never used smokeless tobacco. He reports that he does not drink alcohol.    MEDICATIONS:  Current Outpatient Medications   Medication Sig Dispense Refill     acetaminophen (TYLENOL) 500 MG tablet Take 2 tablets (1,000 mg) by mouth 3 times daily as needed for mild pain       albuterol (PROVENTIL) (2.5 MG/3ML) 0.083% neb solution Take 2.5 mg by nebulization every 4 hours as needed for shortness of breath / dyspnea or wheezing       aspirin 81 MG EC tablet Take 81 mg by mouth daily        atorvastatin (LIPITOR) 40 MG tablet Take 40 mg by mouth daily       cetirizine (ZYRTEC) 10 MG tablet Take 5 mg by mouth daily as needed        cinacalcet (SENSIPAR) 30 MG tablet Take 30 mg by mouth daily       ferrous sulfate (FEROSUL) 325 (65 Fe) MG tablet Take 325 mg by mouth daily (with breakfast)       furosemide (LASIX) 20 MG tablet Take 10 mg by mouth daily        lidocaine (LMX4) 4 % external cream Apply topically 2 times daily Also BID PRN       metoprolol tartrate (LOPRESSOR) 50 MG tablet Take 50 mg by mouth 2 times daily       mometasone (ELOCON) 0.1 % external ointment Apply topically 2 times daily       nitroGLYcerin (NITROSTAT) 0.4 MG sublingual tablet Place 0.4 mg under the tongue every 5 minutes as needed for chest pain For chest pain place 1 tablet under the tongue every 5 minutes for 3 doses. If symptoms persist 5 minutes after 1st dose call 911.       pantoprazole (PROTONIX) 40 MG EC tablet Take 40 mg by mouth daily       rifampin (RIFADIN) 300 MG capsule Take 300 mg by mouth 2 times daily       vitamin D3 (CHOLECALCIFEROL) 1000 units (25 mcg) tablet Take 2,000 Units by mouth daily       Case Management:  I have reviewed the care plan and MDS and do agree with the plan. Patient's desire to return to the community is present, but is not able due  "to care needs . Information reviewed:  Medications, vital signs, orders, and nursing notes.    ROS:  Limited secondary to cognitive impairment but today pt reports 10 point ROS of systems including Constitutional, Eyes, Respiratory, Cardiovascular, Gastroenterology, Genitourinary, Integumentary, Musculoskeletal, Psychiatric were all negative except for pertinent positives noted in my HPI.    Vitals:  /50   Pulse 75   Temp 97.7  F (36.5  C)   Resp 18   Ht 1.88 m (6' 2\")   Wt 96.3 kg (212 lb 6.4 oz)   SpO2 98%   BMI 27.27 kg/m     Body mass index is 27.27 kg/m .  Exam:  GENERAL APPEARANCE:  Alert, in no distress, talkative, pleasant  RESP:  respiratory effort and palpation of chest normal, auscultation of lungs clear , no respiratory distress  CV:  Palpation and auscultation of heart done , rate and rhythm regular, no murmur, mild LE peripheral edema  ABDOMEN:  normal bowel sounds, soft, nontender, no hepatosplenomegaly or other masses  : Armendariz in place with more concentrated urine in bag, no clots nor sediment   M/S:   Gait and station with W/C for mobility, right<left for strengths, Digits and nails with some contractures, reduced  muscle mass  SKIN:  Inspection and Palpation of skin and subcutaneous tissue dry, some dermatitis present  PSYCH:  insight and judgement, memory with impairment, affect and mood normal, follows commands readily         Lab/Diagnostic data:   CBC RESULTS:   Recent Labs   Lab Test 10/16/19  0832 10/01/19  1340   WBC 7.3 7.3   RBC 3.48* 3.50*   HGB 9.8* 9.8*   HCT 30.1* 30.3*   MCV 87 87   MCH 28.2 28.0   MCHC 32.6 32.3   RDW 17.5* 17.7*    229       Last Basic Metabolic Panel:  Recent Labs   Lab Test 10/01/19  1340 08/16/19  0759 07/22/19  0806   *  --  140   POTASSIUM 4.3  --  3.7   CHLORIDE 111*  --  106   MARIN 9.4 9.6 9.0   CO2 25  --  28   BUN 21  --  32*   CR 1.38*  --  1.00   GLC 83  --  75       Liver Function Studies - No results for input(s): PROTTOTAL, " ALBUMIN, BILITOTAL, ALKPHOS, AST, ALT, BILIDIRECT in the last 51478 hours.    TSH   Date Value Ref Range Status   03/15/2019 1.34 0.40 - 4.00 mU/L Final   ]    ASSESSMENT/PLAN  Spastic hemiplegic cerebral palsy (H)  Resides in SNF LTC for increased care needs  Patient has done Physical Therapy, Occupational Therapy in past but could talk with therapy dept to determine if another round would be helpful.    Uses w/c for mobility    CKD (chronic kidney disease) stage 3, GFR 30-59 ml/min (H)  Recheck BMP for monitoring.    Suprapubic catheter (H)  History of recurrent UTIs  Obstruction, uropathy  Improvement with UTIs, etc now that patient has SPC.  Vascular/Wound MD has seen him for removal of proud flesh but will need close monitoring.    Patient usually is symptomatic with UTIs including behavior changes, lethargy and sometimes rigidity and pain (CP exacerbation).     Essential hypertension  Coronary atherosclerosis due to lipid rich plaque  S/P carotid endarterectomy  Abdominal aortic aneurysm (AAA) without rupture (H)  BP goals are <150/90 mm Hg.This is higher than ACC and AHA recommendations due to risk for hypotension, risk of dizziness and falls and risk of tissue/cerebral hypoperfusion. Recheck BMP to determine if discontinue of Lasix appropriate.       Cognitive impairment  Resides in SNF with nursing for supportive cares  Has increased care needs.     Boil  Hx MRSA infection  Improving on meds, now scabbed and surrounding (expected) erythremia only   Follow clinically       Orders written by provider at facility and transcribed by : Magaly Polo MA  1.  Monday, 11/18/19: BMP.  Dx: CKD      Electronically signed by:  ROCÍO Larsen CNP              Sincerely,        ROCÍO Larsen CNP

## 2019-11-15 NOTE — PROGRESS NOTES
"[place] [facility]  Chief Complaint   Patient presents with     [facility] Regulatory     [place] Medical Record Number:  [id number]  Place of Service where encounter took place:  [facility] ([facility]) [[id number]]    HPI:    Diallo Villarreal  is 87 year old (8/31/1932), who is being seen today for a federally mandated E/M visit.  HPI information obtained from: facility chart records, facility staff, patient report and [facility] chart review. 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    Previously lived in a group home but moved to SNF LTC after TCU stay for increased care needs    Patient today talking about, \"When I leave here...\"  We discussed this may be long term but he was unsure why that would be.    He reports he has lost muscle mass in right side.      CKD3  Last few BMPs:   6/10/19: BUN 31, Creat 1.06, GFR 63  7/22/19: BUN 32, Creat 1.00, GFR 67  10/1/19: BUN 21, Creat 1.38, GFR 46    Patient is on Lasix 10 mg daily, no ACEI      Suprapubic catheter (H)  History of recurrent UTIs  Obstruction, uropathy  Patient has history of obstructive uropathy with urinary retention, had indwelling catheter and now has transitioned to SPC (7/25/19) d/t frequent UTIs and discomfort.  SPC placement by Dr Washington, [facility] at [facility]  Has had several issues with cellulitis at SPC site, treated with antibiotics.    Patient reports no issues with SPC site currently.  Nursing reports Wound MD has visited patient a few times for treatment of proud flesh near site.        Essential hypertension  Coronary atherosclerosis due to lipid rich plaque  S/P carotid endarterectomy  Abdominal aortic aneurysm (AAA) without rupture (H)  Per epic note/HX:  F/b [facility] - [facility], Dr Satinder Bowman - cardiology, Dr Enrique - " "Vascular  Per Care Everywhere notes:  1. Coronary artery disease found on abnormal stress test.    9/7/2018 status post atherectomy angioplasty and stenting of the mid LAD.    9/17/2018 angioplasty and stenting of the proximal, mid and distal right coronary artery.   2. AAA measuring 6.4 cm, not amenable to endovascular therapy, follows with Dr. Reyes Enrqiue, Vascular Surgery.  3. Carotid artery disease status post left CEA in 2002.  4. Hypertension.  5. Hyperlipidemia.     3/2/19 CT Abdomen/Pel noting 6.7 cm AAA  Vascular notes indicate need for open repair of AAA so patient was then sent to Cardiology for surgery clearance.  Stress test was abnormal and was f/b angiogram with stenting in mid LAD followed by staged procedure of RCA at a later date. Ultimately patient elected not to have open repair and follow with surveillance U/S.      Is on ASA 81 gm daily indefinitely  He is also on atorvastatin 40 mg q HS, Lasix 10 mg, metoprolol 50 mg BID, nitroglycerin prn    BPs 120-130s/40-60s  (however 82/42 x 1)  HRs 70-80s  Patient denies SOB, CP, HA, lightheadedness    Patient reports he has f/u visit on 11/20/19 with Cardiology for AAA.       Cognitive impairment  BIMS 10  Resides in SNF now d/t increased care needs     Boil  Hx MRSA infection  Patient initially treated with doxycycline, then Bactrim which was DC'd when patient saw Dermatology, who ordered steroid cream for dermatitis and Linezolid for MRSA infection.  Unfortunately patient's insurance did not cover Linezolid and sensitivities showing susceptibility to rifampin, which was ordered.  Patient reports boil area \"itches\" but otherwise no pain.      ALLERGIES:Gluten meal and Wheat extract  PAST MEDICAL HISTORY:   has a past medical history of AAA (abdominal aortic aneurysm) (H), Celiac sprue, and HTN (hypertension).  PAST SURGICAL HISTORY:   has a past surgical history that includes tonsillectomy and LEFT CAROTID ENDARTERECTOMY.  FAMILY HISTORY: family history " is not on file.  SOCIAL HISTORY:  reports that he has quit smoking. He has never used smokeless tobacco. He reports that he does not drink alcohol.    MEDICATIONS:  Current Outpatient Medications   Medication Sig Dispense Refill     acetaminophen (TYLENOL) 500 MG tablet Take 2 tablets (1,000 mg) by mouth 3 times daily as needed for mild pain       albuterol (PROVENTIL) (2.5 MG/3ML) 0.083% neb solution Take 2.5 mg by nebulization every 4 hours as needed for shortness of breath / dyspnea or wheezing       aspirin 81 MG EC tablet Take 81 mg by mouth daily        atorvastatin (LIPITOR) 40 MG tablet Take 40 mg by mouth daily       cetirizine (ZYRTEC) 10 MG tablet Take 5 mg by mouth daily as needed        cinacalcet (SENSIPAR) 30 MG tablet Take 30 mg by mouth daily       ferrous sulfate (FEROSUL) 325 (65 Fe) MG tablet Take 325 mg by mouth daily (with breakfast)       furosemide (LASIX) 20 MG tablet Take 10 mg by mouth daily        lidocaine (LMX4) 4 % external cream Apply topically 2 times daily Also BID PRN       metoprolol tartrate (LOPRESSOR) 50 MG tablet Take 50 mg by mouth 2 times daily       mometasone (ELOCON) 0.1 % external ointment Apply topically 2 times daily       nitroGLYcerin (NITROSTAT) 0.4 MG sublingual tablet Place 0.4 mg under the tongue every 5 minutes as needed for chest pain For chest pain place 1 tablet under the tongue every 5 minutes for 3 doses. If symptoms persist 5 minutes after 1st dose call 911.       pantoprazole (PROTONIX) 40 MG EC tablet Take 40 mg by mouth daily       rifampin (RIFADIN) 300 MG capsule Take 300 mg by mouth 2 times daily       vitamin D3 (CHOLECALCIFEROL) 1000 units (25 mcg) tablet Take 2,000 Units by mouth daily       Case Management:  I have reviewed the care plan and MDS and do agree with the plan. Patient's desire to return to the community is present, but is not able due to care needs . Information reviewed:  Medications, vital signs, orders, and nursing  "notes.    ROS:  Limited secondary to cognitive impairment but today pt reports 10 point ROS of systems including Constitutional, Eyes, Respiratory, Cardiovascular, Gastroenterology, Genitourinary, Integumentary, Musculoskeletal, Psychiatric were all negative except for pertinent positives noted in my HPI.    Vitals:  /50   Pulse 75   Temp 97.7  F (36.5  C)   Resp 18   Ht 1.88 m (6' 2\")   Wt 96.3 kg (212 lb 6.4 oz)   SpO2 98%   BMI 27.27 kg/m    Body mass index is 27.27 kg/m .  Exam:  GENERAL APPEARANCE:  Alert, in no distress, talkative, pleasant  RESP:  respiratory effort and palpation of chest normal, auscultation of lungs clear , no respiratory distress  CV:  Palpation and auscultation of heart done , rate and rhythm regular, no murmur, mild LE peripheral edema  ABDOMEN:  normal bowel sounds, soft, nontender, no hepatosplenomegaly or other masses  : Armendariz in place with more concentrated urine in bag, no clots nor sediment   M/S:   Gait and station with W/C for mobility, right<left for strengths, Digits and nails with some contractures, reduced  muscle mass  SKIN:  Inspection and Palpation of skin and subcutaneous tissue dry, some dermatitis present  PSYCH:  insight and judgement, memory with impairment, affect and mood normal, follows commands readily         Lab/Diagnostic data:   CBC RESULTS:   Recent Labs   Lab Test 10/16/19  0832 10/01/19  1340   WBC 7.3 7.3   RBC 3.48* 3.50*   HGB 9.8* 9.8*   HCT 30.1* 30.3*   MCV 87 87   MCH 28.2 28.0   MCHC 32.6 32.3   RDW 17.5* 17.7*    229       Last Basic Metabolic Panel:  Recent Labs   Lab Test 10/01/19  1340 08/16/19  0759 07/22/19  0806   *  --  140   POTASSIUM 4.3  --  3.7   CHLORIDE 111*  --  106   MARIN 9.4 9.6 9.0   CO2 25  --  28   BUN 21  --  32*   CR 1.38*  --  1.00   GLC 83  --  75       Liver Function Studies - No results for input(s): PROTTOTAL, ALBUMIN, BILITOTAL, ALKPHOS, AST, ALT, BILIDIRECT in the last 28387 hours.    TSH "   Date Value Ref Range Status   03/15/2019 1.34 0.40 - 4.00 mU/L Final   ]    ASSESSMENT/PLAN  Spastic hemiplegic cerebral palsy (H)  Resides in SNF LTC for increased care needs  Patient has done Physical Therapy, Occupational Therapy in past but could talk with therapy dept to determine if another round would be helpful.    Uses w/c for mobility    CKD (chronic kidney disease) stage 3, GFR 30-59 ml/min (H)  Recheck BMP for monitoring.    Suprapubic catheter (H)  History of recurrent UTIs  Obstruction, uropathy  Improvement with UTIs, etc now that patient has SPC.  Vascular/Wound MD has seen him for removal of proud flesh but will need close monitoring.    Patient usually is symptomatic with UTIs including behavior changes, lethargy and sometimes rigidity and pain (CP exacerbation).     Essential hypertension  Coronary atherosclerosis due to lipid rich plaque  S/P carotid endarterectomy  Abdominal aortic aneurysm (AAA) without rupture (H)  BP goals are <150/90 mm Hg.This is higher than ACC and AHA recommendations due to risk for hypotension, risk of dizziness and falls and risk of tissue/cerebral hypoperfusion. Recheck BMP to determine if discontinue of Lasix appropriate.       Cognitive impairment  Resides in SNF with nursing for supportive cares  Has increased care needs.     Boil  Hx MRSA infection  Improving on meds, now scabbed and surrounding (expected) erythremia only   Follow clinically       Orders written by provider at facility and transcribed by : Magaly Polo MA  1.  Monday, 11/18/19: BMP.  Dx: CKD      Electronically signed by:  ROCÍO Larsen CNP

## 2020-01-01 ENCOUNTER — APPOINTMENT (OUTPATIENT)
Dept: GENERAL RADIOLOGY | Facility: CLINIC | Age: 85
DRG: 698 | End: 2020-01-01
Attending: FAMILY MEDICINE
Payer: COMMERCIAL

## 2020-01-01 ENCOUNTER — APPOINTMENT (OUTPATIENT)
Dept: GENERAL RADIOLOGY | Facility: CLINIC | Age: 85
DRG: 698 | End: 2020-01-01
Attending: PHYSICIAN ASSISTANT
Payer: COMMERCIAL

## 2020-01-01 ENCOUNTER — APPOINTMENT (OUTPATIENT)
Dept: GENERAL RADIOLOGY | Facility: CLINIC | Age: 85
DRG: 698 | End: 2020-01-01
Attending: INTERNAL MEDICINE
Payer: COMMERCIAL

## 2020-01-01 ENCOUNTER — HOSPITAL ENCOUNTER (INPATIENT)
Facility: CLINIC | Age: 85
LOS: 6 days | Discharge: NURSING, REHAB OR LONG-TERM CARE | DRG: 698 | End: 2020-09-21
Attending: PHYSICIAN ASSISTANT | Admitting: INTERNAL MEDICINE
Payer: COMMERCIAL

## 2020-01-01 ENCOUNTER — VIRTUAL VISIT (OUTPATIENT)
Dept: GERIATRICS | Facility: CLINIC | Age: 85
End: 2020-01-01
Payer: COMMERCIAL

## 2020-01-01 ENCOUNTER — TELEPHONE (OUTPATIENT)
Dept: GERIATRICS | Facility: CLINIC | Age: 85
End: 2020-01-01

## 2020-01-01 ENCOUNTER — APPOINTMENT (OUTPATIENT)
Dept: ULTRASOUND IMAGING | Facility: CLINIC | Age: 85
DRG: 698 | End: 2020-01-01
Attending: FAMILY MEDICINE
Payer: COMMERCIAL

## 2020-01-01 ENCOUNTER — NURSING HOME VISIT (OUTPATIENT)
Dept: GERIATRICS | Facility: CLINIC | Age: 85
End: 2020-01-01
Payer: COMMERCIAL

## 2020-01-01 ENCOUNTER — APPOINTMENT (OUTPATIENT)
Dept: CT IMAGING | Facility: CLINIC | Age: 85
DRG: 698 | End: 2020-01-01
Attending: PHYSICIAN ASSISTANT
Payer: COMMERCIAL

## 2020-01-01 ENCOUNTER — APPOINTMENT (OUTPATIENT)
Dept: SPEECH THERAPY | Facility: CLINIC | Age: 85
DRG: 698 | End: 2020-01-01
Attending: INTERNAL MEDICINE
Payer: COMMERCIAL

## 2020-01-01 ENCOUNTER — HOSPITAL LABORATORY (OUTPATIENT)
Dept: NURSING HOME | Facility: OTHER | Age: 85
End: 2020-01-01

## 2020-01-01 ENCOUNTER — APPOINTMENT (OUTPATIENT)
Dept: CARDIOLOGY | Facility: CLINIC | Age: 85
DRG: 698 | End: 2020-01-01
Attending: INTERNAL MEDICINE
Payer: COMMERCIAL

## 2020-01-01 ENCOUNTER — ANESTHESIA EVENT (OUTPATIENT)
Dept: MEDSURG UNIT | Facility: CLINIC | Age: 85
DRG: 698 | End: 2020-01-01
Payer: COMMERCIAL

## 2020-01-01 ENCOUNTER — APPOINTMENT (OUTPATIENT)
Dept: CT IMAGING | Facility: CLINIC | Age: 85
DRG: 698 | End: 2020-01-01
Attending: FAMILY MEDICINE
Payer: COMMERCIAL

## 2020-01-01 ENCOUNTER — ANESTHESIA (OUTPATIENT)
Dept: MEDSURG UNIT | Facility: CLINIC | Age: 85
DRG: 698 | End: 2020-01-01
Payer: COMMERCIAL

## 2020-01-01 ENCOUNTER — APPOINTMENT (OUTPATIENT)
Dept: SPEECH THERAPY | Facility: CLINIC | Age: 85
DRG: 698 | End: 2020-01-01
Payer: COMMERCIAL

## 2020-01-01 VITALS
SYSTOLIC BLOOD PRESSURE: 140 MMHG | HEIGHT: 74 IN | BODY MASS INDEX: 26.31 KG/M2 | HEART RATE: 71 BPM | RESPIRATION RATE: 18 BRPM | TEMPERATURE: 97.4 F | WEIGHT: 205 LBS | DIASTOLIC BLOOD PRESSURE: 76 MMHG | OXYGEN SATURATION: 98 %

## 2020-01-01 VITALS
SYSTOLIC BLOOD PRESSURE: 142 MMHG | HEIGHT: 74 IN | RESPIRATION RATE: 18 BRPM | BODY MASS INDEX: 26.67 KG/M2 | HEART RATE: 60 BPM | WEIGHT: 207.8 LBS | OXYGEN SATURATION: 98 % | TEMPERATURE: 97.4 F | DIASTOLIC BLOOD PRESSURE: 82 MMHG

## 2020-01-01 VITALS
HEART RATE: 73 BPM | OXYGEN SATURATION: 94 % | WEIGHT: 203.8 LBS | DIASTOLIC BLOOD PRESSURE: 80 MMHG | SYSTOLIC BLOOD PRESSURE: 150 MMHG | BODY MASS INDEX: 26.15 KG/M2 | TEMPERATURE: 98.3 F | RESPIRATION RATE: 18 BRPM | HEIGHT: 74 IN

## 2020-01-01 VITALS
HEIGHT: 74 IN | TEMPERATURE: 96.5 F | DIASTOLIC BLOOD PRESSURE: 78 MMHG | RESPIRATION RATE: 18 BRPM | HEART RATE: 70 BPM | SYSTOLIC BLOOD PRESSURE: 140 MMHG | OXYGEN SATURATION: 98 % | BODY MASS INDEX: 26.31 KG/M2 | WEIGHT: 205 LBS

## 2020-01-01 VITALS
DIASTOLIC BLOOD PRESSURE: 155 MMHG | RESPIRATION RATE: 20 BRPM | BODY MASS INDEX: 29.82 KG/M2 | TEMPERATURE: 97.6 F | SYSTOLIC BLOOD PRESSURE: 187 MMHG | OXYGEN SATURATION: 86 % | HEART RATE: 56 BPM | WEIGHT: 207.8 LBS

## 2020-01-01 VITALS
HEART RATE: 69 BPM | WEIGHT: 206 LBS | HEIGHT: 74 IN | BODY MASS INDEX: 26.44 KG/M2 | SYSTOLIC BLOOD PRESSURE: 150 MMHG | RESPIRATION RATE: 18 BRPM | OXYGEN SATURATION: 98 % | TEMPERATURE: 97.2 F | DIASTOLIC BLOOD PRESSURE: 81 MMHG

## 2020-01-01 VITALS
TEMPERATURE: 96.2 F | DIASTOLIC BLOOD PRESSURE: 71 MMHG | HEIGHT: 74 IN | HEART RATE: 71 BPM | WEIGHT: 202.4 LBS | BODY MASS INDEX: 25.98 KG/M2 | RESPIRATION RATE: 16 BRPM | OXYGEN SATURATION: 91 % | SYSTOLIC BLOOD PRESSURE: 146 MMHG

## 2020-01-01 VITALS
HEART RATE: 78 BPM | TEMPERATURE: 98 F | BODY MASS INDEX: 25.94 KG/M2 | SYSTOLIC BLOOD PRESSURE: 138 MMHG | RESPIRATION RATE: 18 BRPM | WEIGHT: 202 LBS | DIASTOLIC BLOOD PRESSURE: 74 MMHG | OXYGEN SATURATION: 94 %

## 2020-01-01 VITALS
RESPIRATION RATE: 22 BRPM | BODY MASS INDEX: 32.51 KG/M2 | HEIGHT: 70 IN | TEMPERATURE: 100.4 F | SYSTOLIC BLOOD PRESSURE: 101 MMHG | WEIGHT: 227.07 LBS | DIASTOLIC BLOOD PRESSURE: 56 MMHG | OXYGEN SATURATION: 95 % | HEART RATE: 94 BPM

## 2020-01-01 DIAGNOSIS — G80.2 SPASTIC HEMIPLEGIC CEREBRAL PALSY (H): ICD-10-CM

## 2020-01-01 DIAGNOSIS — R78.81 BACTEREMIA: ICD-10-CM

## 2020-01-01 DIAGNOSIS — J44.9 CHRONIC OBSTRUCTIVE PULMONARY DISEASE, UNSPECIFIED COPD TYPE (H): Primary | ICD-10-CM

## 2020-01-01 DIAGNOSIS — Z97.8 CHRONIC INDWELLING FOLEY CATHETER: ICD-10-CM

## 2020-01-01 DIAGNOSIS — D64.9 NORMOCYTIC ANEMIA: ICD-10-CM

## 2020-01-01 DIAGNOSIS — Z87.891 HISTORY OF TOBACCO USE: ICD-10-CM

## 2020-01-01 DIAGNOSIS — I71.40 ABDOMINAL AORTIC ANEURYSM (AAA) WITHOUT RUPTURE (H): ICD-10-CM

## 2020-01-01 DIAGNOSIS — A41.9 SEPTIC SHOCK (H): Primary | ICD-10-CM

## 2020-01-01 DIAGNOSIS — E83.52 HYPERCALCEMIA: ICD-10-CM

## 2020-01-01 DIAGNOSIS — R65.21 SEPTIC SHOCK (H): ICD-10-CM

## 2020-01-01 DIAGNOSIS — D50.9 IRON DEFICIENCY ANEMIA, UNSPECIFIED IRON DEFICIENCY ANEMIA TYPE: ICD-10-CM

## 2020-01-01 DIAGNOSIS — I25.83 CORONARY ATHEROSCLEROSIS DUE TO LIPID RICH PLAQUE: ICD-10-CM

## 2020-01-01 DIAGNOSIS — N39.0 COMPLICATED UTI (URINARY TRACT INFECTION): ICD-10-CM

## 2020-01-01 DIAGNOSIS — G80.2 SPASTIC HEMIPLEGIC CEREBRAL PALSY (H): Primary | ICD-10-CM

## 2020-01-01 DIAGNOSIS — G93.40 ENCEPHALOPATHY: ICD-10-CM

## 2020-01-01 DIAGNOSIS — Z51.5 HOSPICE CARE PATIENT: ICD-10-CM

## 2020-01-01 DIAGNOSIS — R41.89 COGNITIVE IMPAIRMENT: ICD-10-CM

## 2020-01-01 DIAGNOSIS — Z87.440 HISTORY OF RECURRENT UTIS: ICD-10-CM

## 2020-01-01 DIAGNOSIS — J69.0 ASPIRATION PNEUMONIA OF RIGHT LOWER LOBE DUE TO GASTRIC SECRETIONS (H): ICD-10-CM

## 2020-01-01 DIAGNOSIS — N17.9 ACUTE RENAL FAILURE SUPERIMPOSED ON STAGE 3 CHRONIC KIDNEY DISEASE, UNSPECIFIED ACUTE RENAL FAILURE TYPE: ICD-10-CM

## 2020-01-01 DIAGNOSIS — Z86.14 HX MRSA INFECTION: ICD-10-CM

## 2020-01-01 DIAGNOSIS — K59.01 SLOW TRANSIT CONSTIPATION: ICD-10-CM

## 2020-01-01 DIAGNOSIS — Z87.820 HISTORY OF CONCUSSION: ICD-10-CM

## 2020-01-01 DIAGNOSIS — N13.9 OBSTRUCTION, UROPATHY: ICD-10-CM

## 2020-01-01 DIAGNOSIS — Z93.59 SUPRAPUBIC CATHETER (H): ICD-10-CM

## 2020-01-01 DIAGNOSIS — F09 MILD COGNITIVE DISORDER: ICD-10-CM

## 2020-01-01 DIAGNOSIS — R56.9 SEIZURE-LIKE ACTIVITY (H): ICD-10-CM

## 2020-01-01 DIAGNOSIS — N18.30 CKD (CHRONIC KIDNEY DISEASE) STAGE 3, GFR 30-59 ML/MIN (H): ICD-10-CM

## 2020-01-01 DIAGNOSIS — J69.0 ASPIRATION PNEUMONITIS (H): ICD-10-CM

## 2020-01-01 DIAGNOSIS — S06.2X0A: ICD-10-CM

## 2020-01-01 DIAGNOSIS — Z98.890 S/P CAROTID ENDARTERECTOMY: ICD-10-CM

## 2020-01-01 DIAGNOSIS — Z51.5 END OF LIFE CARE: ICD-10-CM

## 2020-01-01 DIAGNOSIS — A41.9 SEVERE SEPSIS (H): ICD-10-CM

## 2020-01-01 DIAGNOSIS — L02.92 BOIL: Primary | ICD-10-CM

## 2020-01-01 DIAGNOSIS — J18.9 PNEUMONIA: ICD-10-CM

## 2020-01-01 DIAGNOSIS — Z20.822 SUSPECTED COVID-19 VIRUS INFECTION: ICD-10-CM

## 2020-01-01 DIAGNOSIS — Z51.5 END OF LIFE CARE: Primary | ICD-10-CM

## 2020-01-01 DIAGNOSIS — N17.9 ACUTE KIDNEY INJURY (H): ICD-10-CM

## 2020-01-01 DIAGNOSIS — S06.2X9D CONTUSION OF BRAIN WITH LOSS OF CONSCIOUSNESS, SUBSEQUENT ENCOUNTER: ICD-10-CM

## 2020-01-01 DIAGNOSIS — F03.90 DEMENTIA WITHOUT BEHAVIORAL DISTURBANCE, UNSPECIFIED DEMENTIA TYPE: ICD-10-CM

## 2020-01-01 DIAGNOSIS — R54 FRAIL ELDERLY: ICD-10-CM

## 2020-01-01 DIAGNOSIS — A41.9 SEPTIC SHOCK (H): ICD-10-CM

## 2020-01-01 DIAGNOSIS — I10 ESSENTIAL HYPERTENSION: ICD-10-CM

## 2020-01-01 DIAGNOSIS — N18.3 ACUTE RENAL FAILURE SUPERIMPOSED ON STAGE 3 CHRONIC KIDNEY DISEASE, UNSPECIFIED ACUTE RENAL FAILURE TYPE: ICD-10-CM

## 2020-01-01 DIAGNOSIS — K27.9 PUD (PEPTIC ULCER DISEASE): ICD-10-CM

## 2020-01-01 DIAGNOSIS — L02.92 BOIL: ICD-10-CM

## 2020-01-01 DIAGNOSIS — R65.21 SEPTIC SHOCK (H): Primary | ICD-10-CM

## 2020-01-01 DIAGNOSIS — J18.9 PNEUMONIA DUE TO INFECTIOUS ORGANISM, UNSPECIFIED LATERALITY, UNSPECIFIED PART OF LUNG: ICD-10-CM

## 2020-01-01 DIAGNOSIS — E87.1 HYPONATREMIA: ICD-10-CM

## 2020-01-01 DIAGNOSIS — R65.20 SEVERE SEPSIS (H): ICD-10-CM

## 2020-01-01 LAB
ALBUMIN SERPL-MCNC: 2.2 G/DL (ref 3.4–5)
ALBUMIN SERPL-MCNC: 2.2 G/DL (ref 3.4–5)
ALBUMIN SERPL-MCNC: 2.8 G/DL (ref 3.4–5)
ALBUMIN UR-MCNC: 100 MG/DL
ALP SERPL-CCNC: 58 U/L (ref 40–150)
ALP SERPL-CCNC: 62 U/L (ref 40–150)
ALP SERPL-CCNC: 83 U/L (ref 40–150)
ALT SERPL W P-5'-P-CCNC: 13 U/L (ref 0–70)
ALT SERPL W P-5'-P-CCNC: 14 U/L (ref 0–70)
ALT SERPL W P-5'-P-CCNC: 23 U/L (ref 0–70)
AMORPH CRY #/AREA URNS HPF: ABNORMAL /HPF
ANION GAP SERPL CALCULATED.3IONS-SCNC: 10 MMOL/L (ref 3–14)
ANION GAP SERPL CALCULATED.3IONS-SCNC: 11 MMOL/L (ref 3–14)
ANION GAP SERPL CALCULATED.3IONS-SCNC: 12 MMOL/L (ref 3–14)
ANION GAP SERPL CALCULATED.3IONS-SCNC: 5 MMOL/L (ref 3–14)
ANION GAP SERPL CALCULATED.3IONS-SCNC: 8 MMOL/L (ref 3–14)
ANION GAP SERPL CALCULATED.3IONS-SCNC: 9 MMOL/L (ref 3–14)
APPEARANCE UR: ABNORMAL
AST SERPL W P-5'-P-CCNC: 21 U/L (ref 0–45)
AST SERPL W P-5'-P-CCNC: 28 U/L (ref 0–45)
AST SERPL W P-5'-P-CCNC: 29 U/L (ref 0–45)
BACTERIA #/AREA URNS HPF: ABNORMAL /HPF
BACTERIA SPEC CULT: ABNORMAL
BACTERIA SPEC CULT: NO GROWTH
BACTERIA SPEC CULT: NO GROWTH
BASE DEFICIT BLDV-SCNC: 3.7 MMOL/L
BASOPHILS # BLD AUTO: 0 10E9/L (ref 0–0.2)
BASOPHILS # BLD AUTO: 0 10E9/L (ref 0–0.2)
BASOPHILS # BLD AUTO: 0.5 10E9/L (ref 0–0.2)
BASOPHILS NFR BLD AUTO: 0.1 %
BASOPHILS NFR BLD AUTO: 0.1 %
BASOPHILS NFR BLD AUTO: 2 %
BILIRUB SERPL-MCNC: 1.1 MG/DL (ref 0.2–1.3)
BILIRUB SERPL-MCNC: 1.9 MG/DL (ref 0.2–1.3)
BILIRUB SERPL-MCNC: 2 MG/DL (ref 0.2–1.3)
BILIRUB UR QL STRIP: NEGATIVE
BUN SERPL-MCNC: 10 MG/DL (ref 7–30)
BUN SERPL-MCNC: 13 MG/DL (ref 7–30)
BUN SERPL-MCNC: 14 MG/DL (ref 7–30)
BUN SERPL-MCNC: 21 MG/DL (ref 7–30)
BUN SERPL-MCNC: 21 MG/DL (ref 7–30)
BUN SERPL-MCNC: 24 MG/DL (ref 7–30)
BUN SERPL-MCNC: 25 MG/DL (ref 7–30)
BUN SERPL-MCNC: 29 MG/DL (ref 7–30)
CALCIUM SERPL-MCNC: 7.8 MG/DL (ref 8.5–10.1)
CALCIUM SERPL-MCNC: 7.9 MG/DL (ref 8.5–10.1)
CALCIUM SERPL-MCNC: 8.3 MG/DL (ref 8.5–10.1)
CALCIUM SERPL-MCNC: 8.3 MG/DL (ref 8.5–10.1)
CALCIUM SERPL-MCNC: 8.6 MG/DL (ref 8.5–10.1)
CALCIUM SERPL-MCNC: 8.7 MG/DL (ref 8.5–10.1)
CALCIUM SERPL-MCNC: 8.7 MG/DL (ref 8.5–10.1)
CALCIUM SERPL-MCNC: 9 MG/DL (ref 8.5–10.1)
CHLORIDE SERPL-SCNC: 101 MMOL/L (ref 94–109)
CHLORIDE SERPL-SCNC: 102 MMOL/L (ref 94–109)
CHLORIDE SERPL-SCNC: 105 MMOL/L (ref 94–109)
CHLORIDE SERPL-SCNC: 110 MMOL/L (ref 94–109)
CHLORIDE SERPL-SCNC: 112 MMOL/L (ref 94–109)
CHLORIDE SERPL-SCNC: 113 MMOL/L (ref 94–109)
CHLORIDE SERPL-SCNC: 114 MMOL/L (ref 94–109)
CHLORIDE SERPL-SCNC: 95 MMOL/L (ref 94–109)
CHOLEST SERPL-MCNC: 98 MG/DL
CO2 SERPL-SCNC: 21 MMOL/L (ref 20–32)
CO2 SERPL-SCNC: 22 MMOL/L (ref 20–32)
CO2 SERPL-SCNC: 25 MMOL/L (ref 20–32)
CO2 SERPL-SCNC: 30 MMOL/L (ref 20–32)
COLOR UR AUTO: ABNORMAL
CREAT SERPL-MCNC: 0.92 MG/DL (ref 0.66–1.25)
CREAT SERPL-MCNC: 1.05 MG/DL (ref 0.66–1.25)
CREAT SERPL-MCNC: 1.05 MG/DL (ref 0.66–1.25)
CREAT SERPL-MCNC: 1.25 MG/DL (ref 0.66–1.25)
CREAT SERPL-MCNC: 1.69 MG/DL (ref 0.66–1.25)
CREAT SERPL-MCNC: 1.82 MG/DL (ref 0.66–1.25)
CREAT SERPL-MCNC: 1.87 MG/DL (ref 0.66–1.25)
CREAT SERPL-MCNC: 2.13 MG/DL (ref 0.66–1.25)
CRP SERPL-MCNC: 131 MG/L (ref 0–8)
DEPRECATED CALCIDIOL+CALCIFEROL SERPL-MC: <48 UG/L (ref 20–75)
DIFFERENTIAL METHOD BLD: ABNORMAL
EOSINOPHIL # BLD AUTO: 0.3 10E9/L (ref 0–0.7)
EOSINOPHIL NFR BLD AUTO: 0 %
EOSINOPHIL NFR BLD AUTO: 0.2 %
EOSINOPHIL NFR BLD AUTO: 1 %
ERYTHROCYTE [DISTWIDTH] IN BLOOD BY AUTOMATED COUNT: 14.5 % (ref 10–15)
ERYTHROCYTE [DISTWIDTH] IN BLOOD BY AUTOMATED COUNT: 14.6 % (ref 10–15)
ERYTHROCYTE [DISTWIDTH] IN BLOOD BY AUTOMATED COUNT: 14.7 % (ref 10–15)
ERYTHROCYTE [DISTWIDTH] IN BLOOD BY AUTOMATED COUNT: 14.9 % (ref 10–15)
ERYTHROCYTE [DISTWIDTH] IN BLOOD BY AUTOMATED COUNT: 15.7 % (ref 10–15)
GFR SERPL CREATININE-BSD FRML MDRD: 27 ML/MIN/{1.73_M2}
GFR SERPL CREATININE-BSD FRML MDRD: 31 ML/MIN/{1.73_M2}
GFR SERPL CREATININE-BSD FRML MDRD: 32 ML/MIN/{1.73_M2}
GFR SERPL CREATININE-BSD FRML MDRD: 35 ML/MIN/{1.73_M2}
GFR SERPL CREATININE-BSD FRML MDRD: 51 ML/MIN/{1.73_M2}
GFR SERPL CREATININE-BSD FRML MDRD: 63 ML/MIN/{1.73_M2}
GFR SERPL CREATININE-BSD FRML MDRD: 63 ML/MIN/{1.73_M2}
GFR SERPL CREATININE-BSD FRML MDRD: 74 ML/MIN/{1.73_M2}
GLUCOSE BLDC GLUCOMTR-MCNC: 112 MG/DL (ref 70–99)
GLUCOSE BLDC GLUCOMTR-MCNC: 126 MG/DL (ref 70–99)
GLUCOSE BLDC GLUCOMTR-MCNC: 133 MG/DL (ref 70–99)
GLUCOSE BLDC GLUCOMTR-MCNC: 147 MG/DL (ref 70–99)
GLUCOSE BLDC GLUCOMTR-MCNC: 157 MG/DL (ref 70–99)
GLUCOSE BLDC GLUCOMTR-MCNC: 158 MG/DL (ref 70–99)
GLUCOSE BLDC GLUCOMTR-MCNC: 167 MG/DL (ref 70–99)
GLUCOSE BLDC GLUCOMTR-MCNC: 170 MG/DL (ref 70–99)
GLUCOSE BLDC GLUCOMTR-MCNC: 69 MG/DL (ref 70–99)
GLUCOSE BLDC GLUCOMTR-MCNC: 73 MG/DL (ref 70–99)
GLUCOSE BLDC GLUCOMTR-MCNC: 77 MG/DL (ref 70–99)
GLUCOSE BLDC GLUCOMTR-MCNC: 83 MG/DL (ref 70–99)
GLUCOSE BLDC GLUCOMTR-MCNC: 91 MG/DL (ref 70–99)
GLUCOSE BLDC GLUCOMTR-MCNC: 96 MG/DL (ref 70–99)
GLUCOSE SERPL-MCNC: 103 MG/DL (ref 70–99)
GLUCOSE SERPL-MCNC: 125 MG/DL (ref 70–99)
GLUCOSE SERPL-MCNC: 153 MG/DL (ref 70–99)
GLUCOSE SERPL-MCNC: 165 MG/DL (ref 70–99)
GLUCOSE SERPL-MCNC: 166 MG/DL (ref 70–99)
GLUCOSE SERPL-MCNC: 76 MG/DL (ref 70–99)
GLUCOSE SERPL-MCNC: 87 MG/DL (ref 70–99)
GLUCOSE SERPL-MCNC: 88 MG/DL (ref 70–99)
GLUCOSE UR STRIP-MCNC: NEGATIVE MG/DL
HBA1C MFR BLD: 5.5 % (ref 0–5.6)
HCO3 BLDV-SCNC: 23 MMOL/L (ref 21–28)
HCT VFR BLD AUTO: 26.9 % (ref 40–53)
HCT VFR BLD AUTO: 27.4 % (ref 40–53)
HCT VFR BLD AUTO: 29.4 % (ref 40–53)
HCT VFR BLD AUTO: 30.3 % (ref 40–53)
HCT VFR BLD AUTO: 31.3 % (ref 40–53)
HDLC SERPL-MCNC: 35 MG/DL
HGB BLD-MCNC: 8.9 G/DL (ref 13.3–17.7)
HGB BLD-MCNC: 9 G/DL (ref 13.3–17.7)
HGB BLD-MCNC: 9.7 G/DL (ref 13.3–17.7)
HGB BLD-MCNC: 9.9 G/DL (ref 13.3–17.7)
HGB BLD-MCNC: 9.9 G/DL (ref 13.3–17.7)
HGB UR QL STRIP: ABNORMAL
IMM GRANULOCYTES # BLD: 0.1 10E9/L (ref 0–0.4)
IMM GRANULOCYTES # BLD: 1.1 10E9/L (ref 0–0.4)
IMM GRANULOCYTES NFR BLD: 0.8 %
IMM GRANULOCYTES NFR BLD: 4.7 %
KETONES UR STRIP-MCNC: NEGATIVE MG/DL
LABORATORY COMMENT REPORT: NORMAL
LACTATE BLD-SCNC: 1.4 MMOL/L (ref 0.7–2)
LACTATE BLD-SCNC: 2.2 MMOL/L (ref 0.7–2)
LACTATE BLD-SCNC: 2.2 MMOL/L (ref 0.7–2)
LACTATE BLD-SCNC: 2.4 MMOL/L (ref 0.7–2)
LACTATE BLD-SCNC: 2.5 MMOL/L (ref 0.7–2)
LACTATE BLD-SCNC: 2.5 MMOL/L (ref 0.7–2)
LACTATE BLD-SCNC: 2.8 MMOL/L (ref 0.7–2)
LACTATE BLD-SCNC: 2.8 MMOL/L (ref 0.7–2)
LACTATE BLD-SCNC: 3.1 MMOL/L (ref 0.7–2)
LACTATE BLD-SCNC: 3.2 MMOL/L (ref 0.7–2)
LACTATE BLD-SCNC: 3.3 MMOL/L (ref 0.7–2)
LACTATE BLD-SCNC: 3.4 MMOL/L (ref 0.7–2)
LACTATE BLD-SCNC: 6.3 MMOL/L (ref 0.7–2)
LDLC SERPL CALC-MCNC: 40 MG/DL
LEUKOCYTE ESTERASE UR QL STRIP: ABNORMAL
LYMPHOCYTES # BLD AUTO: 0.4 10E9/L (ref 0.8–5.3)
LYMPHOCYTES # BLD AUTO: 1 10E9/L (ref 0.8–5.3)
LYMPHOCYTES # BLD AUTO: 1.3 10E9/L (ref 0.8–5.3)
LYMPHOCYTES NFR BLD AUTO: 2.2 %
LYMPHOCYTES NFR BLD AUTO: 4.2 %
LYMPHOCYTES NFR BLD AUTO: 5 %
Lab: NORMAL
Lab: NORMAL
MAGNESIUM SERPL-MCNC: 1 MG/DL (ref 1.6–2.3)
MAGNESIUM SERPL-MCNC: 1.4 MG/DL (ref 1.6–2.3)
MAGNESIUM SERPL-MCNC: 1.8 MG/DL (ref 1.6–2.3)
MAGNESIUM SERPL-MCNC: 2 MG/DL (ref 1.6–2.3)
MCH RBC QN AUTO: 29.1 PG (ref 26.5–33)
MCH RBC QN AUTO: 29.1 PG (ref 26.5–33)
MCH RBC QN AUTO: 29.6 PG (ref 26.5–33)
MCH RBC QN AUTO: 29.7 PG (ref 26.5–33)
MCH RBC QN AUTO: 29.8 PG (ref 26.5–33)
MCHC RBC AUTO-ENTMCNC: 31.6 G/DL (ref 31.5–36.5)
MCHC RBC AUTO-ENTMCNC: 32.5 G/DL (ref 31.5–36.5)
MCHC RBC AUTO-ENTMCNC: 32.7 G/DL (ref 31.5–36.5)
MCHC RBC AUTO-ENTMCNC: 33 G/DL (ref 31.5–36.5)
MCHC RBC AUTO-ENTMCNC: 33.5 G/DL (ref 31.5–36.5)
MCV RBC AUTO: 89 FL (ref 78–100)
MCV RBC AUTO: 89 FL (ref 78–100)
MCV RBC AUTO: 90 FL (ref 78–100)
MCV RBC AUTO: 91 FL (ref 78–100)
MCV RBC AUTO: 92 FL (ref 78–100)
MONOCYTES # BLD AUTO: 0.9 10E9/L (ref 0–1.3)
MONOCYTES # BLD AUTO: 1.5 10E9/L (ref 0–1.3)
MONOCYTES # BLD AUTO: 3.2 10E9/L (ref 0–1.3)
MONOCYTES NFR BLD AUTO: 12 %
MONOCYTES NFR BLD AUTO: 5.6 %
MONOCYTES NFR BLD AUTO: 6.4 %
NEUTROPHILS # BLD AUTO: 15.3 10E9/L (ref 1.6–8.3)
NEUTROPHILS # BLD AUTO: 20.3 10E9/L (ref 1.6–8.3)
NEUTROPHILS # BLD AUTO: 21.5 10E9/L (ref 1.6–8.3)
NEUTROPHILS NFR BLD AUTO: 80 %
NEUTROPHILS NFR BLD AUTO: 84.4 %
NEUTROPHILS NFR BLD AUTO: 91.3 %
NITRATE UR QL: NEGATIVE
NONHDLC SERPL-MCNC: 63 MG/DL
NRBC # BLD AUTO: 0 10*3/UL
NRBC # BLD AUTO: 0 10*3/UL
NRBC BLD AUTO-RTO: 0 /100
NRBC BLD AUTO-RTO: 0 /100
O2/TOTAL GAS SETTING VFR VENT: 28 %
OSMOLALITY SERPL: 277 MMOL/KG (ref 280–301)
OSMOLALITY UR: 427 MMOL/KG (ref 100–1200)
PCO2 BLDV: 48 MM HG (ref 40–50)
PH BLDV: 7.29 PH (ref 7.32–7.43)
PH UR STRIP: 7 PH (ref 5–7)
PHOSPHATE SERPL-MCNC: 1.9 MG/DL (ref 2.5–4.5)
PLATELET # BLD AUTO: 127 10E9/L (ref 150–450)
PLATELET # BLD AUTO: 133 10E9/L (ref 150–450)
PLATELET # BLD AUTO: 133 10E9/L (ref 150–450)
PLATELET # BLD AUTO: 134 10E9/L (ref 150–450)
PLATELET # BLD AUTO: 184 10E9/L (ref 150–450)
PLATELET # BLD EST: ABNORMAL 10*3/UL
PO2 BLDV: 39 MM HG (ref 25–47)
POTASSIUM SERPL-SCNC: 3 MMOL/L (ref 3.4–5.3)
POTASSIUM SERPL-SCNC: 3.3 MMOL/L (ref 3.4–5.3)
POTASSIUM SERPL-SCNC: 3.4 MMOL/L (ref 3.4–5.3)
POTASSIUM SERPL-SCNC: 3.5 MMOL/L (ref 3.4–5.3)
POTASSIUM SERPL-SCNC: 3.8 MMOL/L (ref 3.4–5.3)
POTASSIUM SERPL-SCNC: 3.9 MMOL/L (ref 3.4–5.3)
POTASSIUM SERPL-SCNC: 4 MMOL/L (ref 3.4–5.3)
POTASSIUM SERPL-SCNC: 4.1 MMOL/L (ref 3.4–5.3)
POTASSIUM SERPL-SCNC: 4.1 MMOL/L (ref 3.4–5.3)
PROCALCITONIN SERPL-MCNC: 3.88 NG/ML
PROCALCITONIN SERPL-MCNC: 7.78 NG/ML
PROLACTIN SERPL-MCNC: 12 UG/L (ref 2–18)
PROLACTIN SERPL-MCNC: 15 UG/L (ref 2–18)
PROT SERPL-MCNC: 5.2 G/DL (ref 6.8–8.8)
PROT SERPL-MCNC: 5.5 G/DL (ref 6.8–8.8)
PROT SERPL-MCNC: 6.2 G/DL (ref 6.8–8.8)
RADIOLOGIST FLAGS: ABNORMAL
RBC # BLD AUTO: 3.01 10E12/L (ref 4.4–5.9)
RBC # BLD AUTO: 3.03 10E12/L (ref 4.4–5.9)
RBC # BLD AUTO: 3.26 10E12/L (ref 4.4–5.9)
RBC # BLD AUTO: 3.4 10E12/L (ref 4.4–5.9)
RBC # BLD AUTO: 3.4 10E12/L (ref 4.4–5.9)
RBC #/AREA URNS AUTO: ABNORMAL /HPF (ref 0–2)
RBC MORPH BLD: NORMAL
SARS-COV-2 RNA SPEC QL NAA+PROBE: NEGATIVE
SARS-COV-2 RNA SPEC QL NAA+PROBE: NORMAL
SODIUM SERPL-SCNC: 129 MMOL/L (ref 133–144)
SODIUM SERPL-SCNC: 134 MMOL/L (ref 133–144)
SODIUM SERPL-SCNC: 135 MMOL/L (ref 133–144)
SODIUM SERPL-SCNC: 137 MMOL/L (ref 133–144)
SODIUM SERPL-SCNC: 140 MMOL/L (ref 133–144)
SODIUM SERPL-SCNC: 143 MMOL/L (ref 133–144)
SODIUM SERPL-SCNC: 145 MMOL/L (ref 133–144)
SODIUM SERPL-SCNC: 146 MMOL/L (ref 133–144)
SODIUM UR-SCNC: 61 MMOL/L
SOURCE: ABNORMAL
SP GR UR STRIP: 1.01 (ref 1–1.03)
SPECIMEN SOURCE: ABNORMAL
SPECIMEN SOURCE: NORMAL
TRIGL SERPL-MCNC: 117 MG/DL
TROPONIN I SERPL-MCNC: 0.35 UG/L (ref 0–0.04)
TSH SERPL DL<=0.005 MIU/L-ACNC: 2.13 MU/L (ref 0.4–4)
UROBILINOGEN UR STRIP-MCNC: 0 MG/DL (ref 0–2)
VANCOMYCIN SERPL-MCNC: 6 MG/L
VITAMIN D2 SERPL-MCNC: <5 UG/L
VITAMIN D3 SERPL-MCNC: 43 UG/L
WBC # BLD AUTO: 16.8 10E9/L (ref 4–11)
WBC # BLD AUTO: 17.4 10E9/L (ref 4–11)
WBC # BLD AUTO: 22.3 10E9/L (ref 4–11)
WBC # BLD AUTO: 24 10E9/L (ref 4–11)
WBC # BLD AUTO: 26.9 10E9/L (ref 4–11)
WBC #/AREA URNS AUTO: ABNORMAL /HPF (ref 0–5)

## 2020-01-01 PROCEDURE — 82803 BLOOD GASES ANY COMBINATION: CPT | Performed by: FAMILY MEDICINE

## 2020-01-01 PROCEDURE — 25800030 ZZH RX IP 258 OP 636: Performed by: FAMILY MEDICINE

## 2020-01-01 PROCEDURE — 20000003 ZZH R&B ICU

## 2020-01-01 PROCEDURE — 83735 ASSAY OF MAGNESIUM: CPT | Performed by: INTERNAL MEDICINE

## 2020-01-01 PROCEDURE — 80053 COMPREHEN METABOLIC PANEL: CPT | Performed by: PHYSICIAN ASSISTANT

## 2020-01-01 PROCEDURE — 71045 X-RAY EXAM CHEST 1 VIEW: CPT | Mod: TC

## 2020-01-01 PROCEDURE — 3E043XZ INTRODUCTION OF VASOPRESSOR INTO CENTRAL VEIN, PERCUTANEOUS APPROACH: ICD-10-PCS | Performed by: INTERNAL MEDICINE

## 2020-01-01 PROCEDURE — 99292 CRITICAL CARE ADDL 30 MIN: CPT | Performed by: INTERNAL MEDICINE

## 2020-01-01 PROCEDURE — 25000128 H RX IP 250 OP 636: Performed by: FAMILY MEDICINE

## 2020-01-01 PROCEDURE — 36415 COLL VENOUS BLD VENIPUNCTURE: CPT | Performed by: INTERNAL MEDICINE

## 2020-01-01 PROCEDURE — 25800025 ZZH RX 258: Performed by: INTERNAL MEDICINE

## 2020-01-01 PROCEDURE — 93005 ELECTROCARDIOGRAM TRACING: CPT

## 2020-01-01 PROCEDURE — 83605 ASSAY OF LACTIC ACID: CPT | Performed by: INTERNAL MEDICINE

## 2020-01-01 PROCEDURE — 99233 SBSQ HOSP IP/OBS HIGH 50: CPT | Performed by: FAMILY MEDICINE

## 2020-01-01 PROCEDURE — 25000128 H RX IP 250 OP 636: Performed by: INTERNAL MEDICINE

## 2020-01-01 PROCEDURE — 92526 ORAL FUNCTION THERAPY: CPT | Mod: GN | Performed by: SPEECH-LANGUAGE PATHOLOGIST

## 2020-01-01 PROCEDURE — 25000132 ZZH RX MED GY IP 250 OP 250 PS 637: Performed by: FAMILY MEDICINE

## 2020-01-01 PROCEDURE — 99223 1ST HOSP IP/OBS HIGH 75: CPT | Mod: AI | Performed by: INTERNAL MEDICINE

## 2020-01-01 PROCEDURE — 87040 BLOOD CULTURE FOR BACTERIA: CPT | Performed by: INTERNAL MEDICINE

## 2020-01-01 PROCEDURE — 99309 SBSQ NF CARE MODERATE MDM 30: CPT | Mod: 95 | Performed by: FAMILY MEDICINE

## 2020-01-01 PROCEDURE — 12000000 ZZH R&B MED SURG/OB

## 2020-01-01 PROCEDURE — 87040 BLOOD CULTURE FOR BACTERIA: CPT | Performed by: PHYSICIAN ASSISTANT

## 2020-01-01 PROCEDURE — 84146 ASSAY OF PROLACTIN: CPT | Performed by: PHYSICIAN ASSISTANT

## 2020-01-01 PROCEDURE — 99309 SBSQ NF CARE MODERATE MDM 30: CPT | Mod: GV | Performed by: NURSE PRACTITIONER

## 2020-01-01 PROCEDURE — 40000274 ZZH STATISTIC RCP CONSULT EA 30 MIN

## 2020-01-01 PROCEDURE — 25000128 H RX IP 250 OP 636: Performed by: NURSE ANESTHETIST, CERTIFIED REGISTERED

## 2020-01-01 PROCEDURE — 80202 ASSAY OF VANCOMYCIN: CPT | Performed by: INTERNAL MEDICINE

## 2020-01-01 PROCEDURE — 99285 EMERGENCY DEPT VISIT HI MDM: CPT | Mod: 25 | Performed by: PHYSICIAN ASSISTANT

## 2020-01-01 PROCEDURE — 86140 C-REACTIVE PROTEIN: CPT | Performed by: PHYSICIAN ASSISTANT

## 2020-01-01 PROCEDURE — 85025 COMPLETE CBC W/AUTO DIFF WBC: CPT | Performed by: PHYSICIAN ASSISTANT

## 2020-01-01 PROCEDURE — 25800030 ZZH RX IP 258 OP 636: Performed by: INTERNAL MEDICINE

## 2020-01-01 PROCEDURE — 96375 TX/PRO/DX INJ NEW DRUG ADDON: CPT | Performed by: PHYSICIAN ASSISTANT

## 2020-01-01 PROCEDURE — 80048 BASIC METABOLIC PNL TOTAL CA: CPT | Performed by: INTERNAL MEDICINE

## 2020-01-01 PROCEDURE — 40000270 ZZH STATISTIC OXYGEN  O2DAILY TECH TIME

## 2020-01-01 PROCEDURE — 36415 COLL VENOUS BLD VENIPUNCTURE: CPT | Performed by: FAMILY MEDICINE

## 2020-01-01 PROCEDURE — 99232 SBSQ HOSP IP/OBS MODERATE 35: CPT | Performed by: FAMILY MEDICINE

## 2020-01-01 PROCEDURE — 25800030 ZZH RX IP 258 OP 636: Performed by: PHYSICIAN ASSISTANT

## 2020-01-01 PROCEDURE — 40000986 XR CHEST PORT 1 VW

## 2020-01-01 PROCEDURE — 25000132 ZZH RX MED GY IP 250 OP 250 PS 637: Performed by: INTERNAL MEDICINE

## 2020-01-01 PROCEDURE — 87077 CULTURE AEROBIC IDENTIFY: CPT | Performed by: PHYSICIAN ASSISTANT

## 2020-01-01 PROCEDURE — 25000132 ZZH RX MED GY IP 250 OP 250 PS 637: Performed by: HOSPITALIST

## 2020-01-01 PROCEDURE — 96365 THER/PROPH/DIAG IV INF INIT: CPT | Performed by: PHYSICIAN ASSISTANT

## 2020-01-01 PROCEDURE — 99309 SBSQ NF CARE MODERATE MDM 30: CPT | Performed by: NURSE PRACTITIONER

## 2020-01-01 PROCEDURE — 94640 AIRWAY INHALATION TREATMENT: CPT

## 2020-01-01 PROCEDURE — 40000275 ZZH STATISTIC RCP TIME EA 10 MIN

## 2020-01-01 PROCEDURE — 93308 TTE F-UP OR LMTD: CPT

## 2020-01-01 PROCEDURE — 83605 ASSAY OF LACTIC ACID: CPT | Performed by: HOSPITALIST

## 2020-01-01 PROCEDURE — 84443 ASSAY THYROID STIM HORMONE: CPT | Performed by: PHYSICIAN ASSISTANT

## 2020-01-01 PROCEDURE — 00000146 ZZHCL STATISTIC GLUCOSE BY METER IP

## 2020-01-01 PROCEDURE — 99309 SBSQ NF CARE MODERATE MDM 30: CPT | Mod: 95 | Performed by: NURSE PRACTITIONER

## 2020-01-01 PROCEDURE — 84132 ASSAY OF SERUM POTASSIUM: CPT | Performed by: INTERNAL MEDICINE

## 2020-01-01 PROCEDURE — 83605 ASSAY OF LACTIC ACID: CPT | Performed by: FAMILY MEDICINE

## 2020-01-01 PROCEDURE — 25000125 ZZHC RX 250: Performed by: FAMILY MEDICINE

## 2020-01-01 PROCEDURE — 36415 COLL VENOUS BLD VENIPUNCTURE: CPT | Performed by: HOSPITALIST

## 2020-01-01 PROCEDURE — 96361 HYDRATE IV INFUSION ADD-ON: CPT | Performed by: PHYSICIAN ASSISTANT

## 2020-01-01 PROCEDURE — 76770 US EXAM ABDO BACK WALL COMP: CPT

## 2020-01-01 PROCEDURE — 93321 DOPPLER ECHO F-UP/LMTD STD: CPT | Mod: 26 | Performed by: INTERNAL MEDICINE

## 2020-01-01 PROCEDURE — 84300 ASSAY OF URINE SODIUM: CPT | Performed by: INTERNAL MEDICINE

## 2020-01-01 PROCEDURE — 83935 ASSAY OF URINE OSMOLALITY: CPT | Performed by: INTERNAL MEDICINE

## 2020-01-01 PROCEDURE — 84443 ASSAY THYROID STIM HORMONE: CPT | Performed by: INTERNAL MEDICINE

## 2020-01-01 PROCEDURE — 25800029 ZZH RX IP 258 OP 250: Performed by: FAMILY MEDICINE

## 2020-01-01 PROCEDURE — 87186 SC STD MICRODIL/AGAR DIL: CPT | Performed by: PHYSICIAN ASSISTANT

## 2020-01-01 PROCEDURE — 70450 CT HEAD/BRAIN W/O DYE: CPT

## 2020-01-01 PROCEDURE — 99285 EMERGENCY DEPT VISIT HI MDM: CPT | Mod: Z6 | Performed by: PHYSICIAN ASSISTANT

## 2020-01-01 PROCEDURE — 92610 EVALUATE SWALLOWING FUNCTION: CPT | Mod: GN | Performed by: SPEECH-LANGUAGE PATHOLOGIST

## 2020-01-01 PROCEDURE — 85027 COMPLETE CBC AUTOMATED: CPT | Performed by: INTERNAL MEDICINE

## 2020-01-01 PROCEDURE — 40000895 ZZH STATISTIC SLP IP EVAL DEFER: Performed by: SPEECH-LANGUAGE PATHOLOGIST

## 2020-01-01 PROCEDURE — C9803 HOPD COVID-19 SPEC COLLECT: HCPCS | Performed by: PHYSICIAN ASSISTANT

## 2020-01-01 PROCEDURE — 83735 ASSAY OF MAGNESIUM: CPT | Performed by: FAMILY MEDICINE

## 2020-01-01 PROCEDURE — 80053 COMPREHEN METABOLIC PANEL: CPT | Performed by: INTERNAL MEDICINE

## 2020-01-01 PROCEDURE — 85025 COMPLETE CBC W/AUTO DIFF WBC: CPT | Performed by: INTERNAL MEDICINE

## 2020-01-01 PROCEDURE — 40000671 ZZH STATISTIC ANESTHESIA CASE

## 2020-01-01 PROCEDURE — 99310 SBSQ NF CARE HIGH MDM 45: CPT | Performed by: FAMILY MEDICINE

## 2020-01-01 PROCEDURE — 87086 URINE CULTURE/COLONY COUNT: CPT | Performed by: PHYSICIAN ASSISTANT

## 2020-01-01 PROCEDURE — 99233 SBSQ HOSP IP/OBS HIGH 50: CPT | Performed by: INTERNAL MEDICINE

## 2020-01-01 PROCEDURE — 84145 PROCALCITONIN (PCT): CPT | Performed by: FAMILY MEDICINE

## 2020-01-01 PROCEDURE — 99207 ZZC CDG-CODE CATEGORY CHANGED: CPT | Performed by: INTERNAL MEDICINE

## 2020-01-01 PROCEDURE — 25000128 H RX IP 250 OP 636: Performed by: PHYSICIAN ASSISTANT

## 2020-01-01 PROCEDURE — 87800 DETECT AGNT MULT DNA DIREC: CPT | Performed by: PHYSICIAN ASSISTANT

## 2020-01-01 PROCEDURE — 94640 AIRWAY INHALATION TREATMENT: CPT | Mod: 76

## 2020-01-01 PROCEDURE — U0003 INFECTIOUS AGENT DETECTION BY NUCLEIC ACID (DNA OR RNA); SEVERE ACUTE RESPIRATORY SYNDROME CORONAVIRUS 2 (SARS-COV-2) (CORONAVIRUS DISEASE [COVID-19]), AMPLIFIED PROBE TECHNIQUE, MAKING USE OF HIGH THROUGHPUT TECHNOLOGIES AS DESCRIBED BY CMS-2020-01-R: HCPCS | Performed by: PHYSICIAN ASSISTANT

## 2020-01-01 PROCEDURE — 85027 COMPLETE CBC AUTOMATED: CPT | Performed by: FAMILY MEDICINE

## 2020-01-01 PROCEDURE — 84146 ASSAY OF PROLACTIN: CPT | Performed by: INTERNAL MEDICINE

## 2020-01-01 PROCEDURE — 25000125 ZZHC RX 250: Performed by: INTERNAL MEDICINE

## 2020-01-01 PROCEDURE — 80048 BASIC METABOLIC PNL TOTAL CA: CPT | Performed by: FAMILY MEDICINE

## 2020-01-01 PROCEDURE — 84100 ASSAY OF PHOSPHORUS: CPT | Performed by: FAMILY MEDICINE

## 2020-01-01 PROCEDURE — 87040 BLOOD CULTURE FOR BACTERIA: CPT | Performed by: FAMILY MEDICINE

## 2020-01-01 PROCEDURE — 25800030 ZZH RX IP 258 OP 636: Performed by: HOSPITALIST

## 2020-01-01 PROCEDURE — 99291 CRITICAL CARE FIRST HOUR: CPT | Performed by: INTERNAL MEDICINE

## 2020-01-01 PROCEDURE — 81001 URINALYSIS AUTO W/SCOPE: CPT | Performed by: PHYSICIAN ASSISTANT

## 2020-01-01 PROCEDURE — 25000125 ZZHC RX 250: Performed by: HOSPITALIST

## 2020-01-01 PROCEDURE — 99239 HOSP IP/OBS DSCHRG MGMT >30: CPT | Performed by: FAMILY MEDICINE

## 2020-01-01 PROCEDURE — 83930 ASSAY OF BLOOD OSMOLALITY: CPT | Performed by: INTERNAL MEDICINE

## 2020-01-01 PROCEDURE — 93308 TTE F-UP OR LMTD: CPT | Mod: 26 | Performed by: INTERNAL MEDICINE

## 2020-01-01 PROCEDURE — 83605 ASSAY OF LACTIC ACID: CPT | Performed by: PHYSICIAN ASSISTANT

## 2020-01-01 PROCEDURE — 93325 DOPPLER ECHO COLOR FLOW MAPG: CPT | Mod: 26 | Performed by: INTERNAL MEDICINE

## 2020-01-01 PROCEDURE — 84484 ASSAY OF TROPONIN QUANT: CPT | Performed by: FAMILY MEDICINE

## 2020-01-01 PROCEDURE — 25000132 ZZH RX MED GY IP 250 OP 250 PS 637: Performed by: PHYSICIAN ASSISTANT

## 2020-01-01 PROCEDURE — 80053 COMPREHEN METABOLIC PANEL: CPT | Performed by: FAMILY MEDICINE

## 2020-01-01 RX ORDER — FENTANYL CITRATE 50 UG/ML
INJECTION, SOLUTION INTRAMUSCULAR; INTRAVENOUS PRN
Status: DISCONTINUED | OUTPATIENT
Start: 2020-01-01 | End: 2020-01-01

## 2020-01-01 RX ORDER — MORPHINE SULFATE 100 MG/5ML
5-10 SOLUTION ORAL
Status: DISCONTINUED | OUTPATIENT
Start: 2020-01-01 | End: 2020-01-01 | Stop reason: HOSPADM

## 2020-01-01 RX ORDER — SODIUM CHLORIDE 9 MG/ML
INJECTION, SOLUTION INTRAVENOUS CONTINUOUS
Status: DISCONTINUED | OUTPATIENT
Start: 2020-01-01 | End: 2020-01-01

## 2020-01-01 RX ORDER — CINACALCET 30 MG/1
30 TABLET, FILM COATED ORAL DAILY
Status: DISCONTINUED | OUTPATIENT
Start: 2020-01-01 | End: 2020-01-01

## 2020-01-01 RX ORDER — ACETAMINOPHEN 650 MG/1
650 SUPPOSITORY RECTAL EVERY 6 HOURS PRN
Qty: 5 SUPPOSITORY | Refills: 0 | Status: SHIPPED | OUTPATIENT
Start: 2020-01-01

## 2020-01-01 RX ORDER — POTASSIUM CHLORIDE 1.5 G/1.58G
20-40 POWDER, FOR SOLUTION ORAL
Status: DISCONTINUED | OUTPATIENT
Start: 2020-01-01 | End: 2020-01-01

## 2020-01-01 RX ORDER — METOPROLOL TARTRATE 25 MG/1
25 TABLET, FILM COATED ORAL ONCE
Status: COMPLETED | OUTPATIENT
Start: 2020-01-01 | End: 2020-01-01

## 2020-01-01 RX ORDER — ONDANSETRON 4 MG/1
4 TABLET, ORALLY DISINTEGRATING ORAL EVERY 6 HOURS PRN
Status: DISCONTINUED | OUTPATIENT
Start: 2020-01-01 | End: 2020-01-01 | Stop reason: HOSPADM

## 2020-01-01 RX ORDER — ATROPINE SULFATE 10 MG/ML
1-2 SOLUTION/ DROPS OPHTHALMIC
Qty: 15 ML | Refills: 0 | Status: SHIPPED | OUTPATIENT
Start: 2020-01-01

## 2020-01-01 RX ORDER — NALOXONE HYDROCHLORIDE 0.4 MG/ML
.1-.4 INJECTION, SOLUTION INTRAMUSCULAR; INTRAVENOUS; SUBCUTANEOUS
Status: DISCONTINUED | OUTPATIENT
Start: 2020-01-01 | End: 2020-01-01

## 2020-01-01 RX ORDER — PANTOPRAZOLE SODIUM 40 MG/1
40 TABLET, DELAYED RELEASE ORAL
Status: DISCONTINUED | OUTPATIENT
Start: 2020-01-01 | End: 2020-01-01

## 2020-01-01 RX ORDER — LORAZEPAM 0.5 MG/1
.5-1 TABLET ORAL
Status: DISCONTINUED | OUTPATIENT
Start: 2020-01-01 | End: 2020-01-01 | Stop reason: HOSPADM

## 2020-01-01 RX ORDER — LORAZEPAM 2 MG/ML
.5-1 INJECTION INTRAMUSCULAR
Status: DISCONTINUED | OUTPATIENT
Start: 2020-01-01 | End: 2020-01-01 | Stop reason: HOSPADM

## 2020-01-01 RX ORDER — LORAZEPAM 2 MG/ML
0.5 INJECTION INTRAMUSCULAR
Status: DISCONTINUED | OUTPATIENT
Start: 2020-01-01 | End: 2020-01-01

## 2020-01-01 RX ORDER — POTASSIUM CL/LIDO/0.9 % NACL 10MEQ/0.1L
10 INTRAVENOUS SOLUTION, PIGGYBACK (ML) INTRAVENOUS
Status: DISCONTINUED | OUTPATIENT
Start: 2020-01-01 | End: 2020-01-01

## 2020-01-01 RX ORDER — FUROSEMIDE 10 MG/ML
40 INJECTION INTRAMUSCULAR; INTRAVENOUS ONCE
Status: COMPLETED | OUTPATIENT
Start: 2020-01-01 | End: 2020-01-01

## 2020-01-01 RX ORDER — GLIPIZIDE 10 MG/1
1-2 TABLET ORAL EVERY 8 HOURS PRN
Qty: 15 ML | Refills: 0 | Status: SHIPPED | OUTPATIENT
Start: 2020-01-01

## 2020-01-01 RX ORDER — POTASSIUM CHLORIDE 1500 MG/1
20-40 TABLET, EXTENDED RELEASE ORAL
Status: DISCONTINUED | OUTPATIENT
Start: 2020-01-01 | End: 2020-01-01

## 2020-01-01 RX ORDER — LORAZEPAM 0.5 MG/1
1 TABLET ORAL
Qty: 5 TABLET | Refills: 0 | Status: SHIPPED | OUTPATIENT
Start: 2020-01-01

## 2020-01-01 RX ORDER — ONDANSETRON 2 MG/ML
4 INJECTION INTRAMUSCULAR; INTRAVENOUS EVERY 6 HOURS PRN
Status: DISCONTINUED | OUTPATIENT
Start: 2020-01-01 | End: 2020-01-01

## 2020-01-01 RX ORDER — NICOTINE POLACRILEX 4 MG
15-30 LOZENGE BUCCAL
Status: DISCONTINUED | OUTPATIENT
Start: 2020-01-01 | End: 2020-01-01

## 2020-01-01 RX ORDER — GLYCOPYRROLATE 0.2 MG/ML
.2-.4 INJECTION, SOLUTION INTRAMUSCULAR; INTRAVENOUS EVERY 4 HOURS PRN
Status: DISCONTINUED | OUTPATIENT
Start: 2020-01-01 | End: 2020-01-01 | Stop reason: HOSPADM

## 2020-01-01 RX ORDER — BISACODYL 10 MG
10 SUPPOSITORY, RECTAL RECTAL DAILY PRN
Status: DISCONTINUED | OUTPATIENT
Start: 2020-01-01 | End: 2020-01-01

## 2020-01-01 RX ORDER — SODIUM CHLORIDE 450 MG/100ML
INJECTION, SOLUTION INTRAVENOUS CONTINUOUS
Status: DISCONTINUED | OUTPATIENT
Start: 2020-01-01 | End: 2020-01-01

## 2020-01-01 RX ORDER — METOPROLOL TARTRATE 50 MG
50 TABLET ORAL 2 TIMES DAILY
Status: DISCONTINUED | OUTPATIENT
Start: 2020-01-01 | End: 2020-01-01

## 2020-01-01 RX ORDER — ACETAMINOPHEN 650 MG/1
650 SUPPOSITORY RECTAL ONCE
Status: COMPLETED | OUTPATIENT
Start: 2020-01-01 | End: 2020-01-01

## 2020-01-01 RX ORDER — VITAMIN B COMPLEX
2000 TABLET ORAL DAILY
Status: DISCONTINUED | OUTPATIENT
Start: 2020-01-01 | End: 2020-01-01

## 2020-01-01 RX ORDER — GLIPIZIDE 10 MG/1
1-2 TABLET ORAL EVERY 8 HOURS PRN
Status: DISCONTINUED | OUTPATIENT
Start: 2020-01-01 | End: 2020-01-01 | Stop reason: HOSPADM

## 2020-01-01 RX ORDER — LIDOCAINE 40 MG/G
CREAM TOPICAL
Status: DISCONTINUED | OUTPATIENT
Start: 2020-01-01 | End: 2020-01-01 | Stop reason: HOSPADM

## 2020-01-01 RX ORDER — MORPHINE SULFATE 100 MG/5ML
5 SOLUTION ORAL
Qty: 15 ML | Refills: 0
Start: 2020-01-01

## 2020-01-01 RX ORDER — DEXTROSE MONOHYDRATE 25 G/50ML
25-50 INJECTION, SOLUTION INTRAVENOUS
Status: DISCONTINUED | OUTPATIENT
Start: 2020-01-01 | End: 2020-01-01

## 2020-01-01 RX ORDER — NALOXONE HYDROCHLORIDE 0.4 MG/ML
.1-.4 INJECTION, SOLUTION INTRAMUSCULAR; INTRAVENOUS; SUBCUTANEOUS
Status: DISCONTINUED | OUTPATIENT
Start: 2020-01-01 | End: 2020-01-01 | Stop reason: HOSPADM

## 2020-01-01 RX ORDER — MORPHINE SULFATE 2 MG/ML
1-2 INJECTION, SOLUTION INTRAMUSCULAR; INTRAVENOUS
Status: DISCONTINUED | OUTPATIENT
Start: 2020-01-01 | End: 2020-01-01 | Stop reason: HOSPADM

## 2020-01-01 RX ORDER — OLANZAPINE 10 MG/2ML
2.5 INJECTION, POWDER, FOR SOLUTION INTRAMUSCULAR EVERY 6 HOURS PRN
Status: DISCONTINUED | OUTPATIENT
Start: 2020-01-01 | End: 2020-01-01

## 2020-01-01 RX ORDER — ATORVASTATIN CALCIUM 40 MG/1
20 TABLET, FILM COATED ORAL DAILY
Status: ON HOLD
Start: 2020-01-01 | End: 2020-01-01

## 2020-01-01 RX ORDER — MORPHINE SULFATE 10 MG/5ML
5-10 SOLUTION ORAL
Status: DISCONTINUED | OUTPATIENT
Start: 2020-01-01 | End: 2020-01-01 | Stop reason: HOSPADM

## 2020-01-01 RX ORDER — ACETAMINOPHEN 325 MG/1
650 TABLET ORAL EVERY 4 HOURS PRN
Status: DISCONTINUED | OUTPATIENT
Start: 2020-01-01 | End: 2020-01-01

## 2020-01-01 RX ORDER — BENZTROPINE MESYLATE 0.5 MG/1
1-2 TABLET ORAL 3 TIMES DAILY PRN
Status: DISCONTINUED | OUTPATIENT
Start: 2020-01-01 | End: 2020-01-01

## 2020-01-01 RX ORDER — ONDANSETRON 2 MG/ML
4 INJECTION INTRAMUSCULAR; INTRAVENOUS EVERY 6 HOURS PRN
Status: DISCONTINUED | OUTPATIENT
Start: 2020-01-01 | End: 2020-01-01 | Stop reason: HOSPADM

## 2020-01-01 RX ORDER — ACETAMINOPHEN 650 MG/1
650 SUPPOSITORY RECTAL EVERY 6 HOURS PRN
Qty: 5 SUPPOSITORY | Refills: 0 | Status: SHIPPED | OUTPATIENT
Start: 2020-01-01 | End: 2020-01-01

## 2020-01-01 RX ORDER — SALIVA STIMULANT COMB. NO.3
2 SPRAY, NON-AEROSOL (ML) MUCOUS MEMBRANE
Status: DISCONTINUED | OUTPATIENT
Start: 2020-01-01 | End: 2020-01-01 | Stop reason: HOSPADM

## 2020-01-01 RX ORDER — SALIVA STIMULANT COMB. NO.3
2 SPRAY, NON-AEROSOL (ML) MUCOUS MEMBRANE
Qty: 1 BOTTLE | Refills: 0 | Status: SHIPPED | OUTPATIENT
Start: 2020-01-01

## 2020-01-01 RX ORDER — LABETALOL HYDROCHLORIDE 5 MG/ML
10 INJECTION, SOLUTION INTRAVENOUS
Status: DISCONTINUED | OUTPATIENT
Start: 2020-01-01 | End: 2020-01-01

## 2020-01-01 RX ORDER — POLYETHYLENE GLYCOL 3350 17 G/17G
17 POWDER, FOR SOLUTION ORAL DAILY PRN
Status: DISCONTINUED | OUTPATIENT
Start: 2020-01-01 | End: 2020-01-01

## 2020-01-01 RX ORDER — POTASSIUM CHLORIDE 7.45 MG/ML
10 INJECTION INTRAVENOUS
Status: DISCONTINUED | OUTPATIENT
Start: 2020-01-01 | End: 2020-01-01

## 2020-01-01 RX ORDER — NOREPINEPHRINE BITARTRATE 0.06 MG/ML
.03-.4 INJECTION, SOLUTION INTRAVENOUS CONTINUOUS
Status: DISCONTINUED | OUTPATIENT
Start: 2020-01-01 | End: 2020-01-01

## 2020-01-01 RX ORDER — ONDANSETRON 4 MG/1
4 TABLET, ORALLY DISINTEGRATING ORAL EVERY 6 HOURS PRN
Qty: 5 TABLET | Refills: 0 | Status: SHIPPED | OUTPATIENT
Start: 2020-01-01

## 2020-01-01 RX ORDER — ACETAMINOPHEN 650 MG/1
650 SUPPOSITORY RECTAL ONCE
Status: DISCONTINUED | OUTPATIENT
Start: 2020-01-01 | End: 2020-01-01 | Stop reason: CLARIF

## 2020-01-01 RX ORDER — ONDANSETRON 4 MG/1
4 TABLET, ORALLY DISINTEGRATING ORAL EVERY 6 HOURS PRN
Status: DISCONTINUED | OUTPATIENT
Start: 2020-01-01 | End: 2020-01-01

## 2020-01-01 RX ORDER — MAGNESIUM SULFATE HEPTAHYDRATE 40 MG/ML
2 INJECTION, SOLUTION INTRAVENOUS DAILY PRN
Status: DISCONTINUED | OUTPATIENT
Start: 2020-01-01 | End: 2020-01-01

## 2020-01-01 RX ORDER — MORPHINE SULFATE 100 MG/5ML
5-10 SOLUTION ORAL
Qty: 15 ML | Refills: 0 | Status: SHIPPED | OUTPATIENT
Start: 2020-01-01 | End: 2020-01-01

## 2020-01-01 RX ORDER — HEPARIN SODIUM,PORCINE 10 UNIT/ML
5-10 VIAL (ML) INTRAVENOUS
Status: DISCONTINUED | OUTPATIENT
Start: 2020-01-01 | End: 2020-01-01

## 2020-01-01 RX ORDER — METOPROLOL TARTRATE 1 MG/ML
5 INJECTION, SOLUTION INTRAVENOUS EVERY 6 HOURS PRN
Status: DISCONTINUED | OUTPATIENT
Start: 2020-01-01 | End: 2020-01-01

## 2020-01-01 RX ORDER — POTASSIUM CHLORIDE 29.8 MG/ML
20 INJECTION INTRAVENOUS
Status: DISCONTINUED | OUTPATIENT
Start: 2020-01-01 | End: 2020-01-01 | Stop reason: CLARIF

## 2020-01-01 RX ORDER — MAGNESIUM SULFATE HEPTAHYDRATE 40 MG/ML
4 INJECTION, SOLUTION INTRAVENOUS EVERY 4 HOURS PRN
Status: DISCONTINUED | OUTPATIENT
Start: 2020-01-01 | End: 2020-01-01

## 2020-01-01 RX ORDER — IPRATROPIUM BROMIDE AND ALBUTEROL SULFATE 2.5; .5 MG/3ML; MG/3ML
3 SOLUTION RESPIRATORY (INHALATION) EVERY 4 HOURS PRN
Status: DISCONTINUED | OUTPATIENT
Start: 2020-01-01 | End: 2020-01-01 | Stop reason: HOSPADM

## 2020-01-01 RX ORDER — ATROPINE SULFATE 10 MG/ML
1-2 SOLUTION/ DROPS OPHTHALMIC
Status: DISCONTINUED | OUTPATIENT
Start: 2020-01-01 | End: 2020-01-01 | Stop reason: HOSPADM

## 2020-01-01 RX ORDER — DOCUSATE SODIUM 100 MG/1
100 CAPSULE, LIQUID FILLED ORAL 2 TIMES DAILY
Status: DISCONTINUED | OUTPATIENT
Start: 2020-01-01 | End: 2020-01-01

## 2020-01-01 RX ORDER — HEPARIN SODIUM,PORCINE 10 UNIT/ML
5-10 VIAL (ML) INTRAVENOUS EVERY 24 HOURS
Status: DISCONTINUED | OUTPATIENT
Start: 2020-01-01 | End: 2020-01-01

## 2020-01-01 RX ORDER — MINERAL OIL/HYDROPHIL PETROLAT
OINTMENT (GRAM) TOPICAL EVERY 8 HOURS PRN
Status: DISCONTINUED | OUTPATIENT
Start: 2020-01-01 | End: 2020-01-01 | Stop reason: HOSPADM

## 2020-01-01 RX ORDER — MINERAL OIL/HYDROPHIL PETROLAT
OINTMENT (GRAM) TOPICAL EVERY 8 HOURS PRN
Qty: 198 G | Refills: 0 | Status: SHIPPED | OUTPATIENT
Start: 2020-01-01

## 2020-01-01 RX ORDER — ATORVASTATIN CALCIUM 10 MG/1
20 TABLET, FILM COATED ORAL EVERY EVENING
Status: DISCONTINUED | OUTPATIENT
Start: 2020-01-01 | End: 2020-01-01

## 2020-01-01 RX ORDER — ACETAMINOPHEN 650 MG/1
650 SUPPOSITORY RECTAL EVERY 6 HOURS PRN
Status: DISCONTINUED | OUTPATIENT
Start: 2020-01-01 | End: 2020-01-01 | Stop reason: HOSPADM

## 2020-01-01 RX ORDER — AMOXICILLIN 250 MG
1 CAPSULE ORAL DAILY
Status: ON HOLD | COMMUNITY
End: 2020-01-01

## 2020-01-01 RX ADMIN — DOCUSATE SODIUM 100 MG: 100 CAPSULE, LIQUID FILLED ORAL at 21:36

## 2020-01-01 RX ADMIN — ACETAMINOPHEN 650 MG: 650 SUPPOSITORY RECTAL at 18:23

## 2020-01-01 RX ADMIN — LABETALOL HYDROCHLORIDE 10 MG: 5 INJECTION, SOLUTION INTRAVENOUS at 21:48

## 2020-01-01 RX ADMIN — SODIUM CHLORIDE 500 ML: 9 INJECTION, SOLUTION INTRAVENOUS at 20:43

## 2020-01-01 RX ADMIN — Medication 0.03 MCG/KG/MIN: at 16:56

## 2020-01-01 RX ADMIN — TAZOBACTAM SODIUM AND PIPERACILLIN SODIUM 4.5 G: 500; 4 INJECTION, SOLUTION INTRAVENOUS at 13:45

## 2020-01-01 RX ADMIN — LEVETIRACETAM 500 MG: 100 INJECTION, SOLUTION INTRAVENOUS at 13:03

## 2020-01-01 RX ADMIN — VANCOMYCIN HYDROCHLORIDE 2000 MG: 1 INJECTION, POWDER, LYOPHILIZED, FOR SOLUTION INTRAVENOUS at 12:46

## 2020-01-01 RX ADMIN — SODIUM CHLORIDE, POTASSIUM CHLORIDE, SODIUM LACTATE AND CALCIUM CHLORIDE 500 ML: 600; 310; 30; 20 INJECTION, SOLUTION INTRAVENOUS at 19:05

## 2020-01-01 RX ADMIN — DOCUSATE SODIUM 100 MG: 100 CAPSULE, LIQUID FILLED ORAL at 10:47

## 2020-01-01 RX ADMIN — SODIUM CHLORIDE 1000 ML: 9 INJECTION, SOLUTION INTRAVENOUS at 10:38

## 2020-01-01 RX ADMIN — ATORVASTATIN CALCIUM 20 MG: 10 TABLET, FILM COATED ORAL at 19:58

## 2020-01-01 RX ADMIN — HEPARIN, PORCINE (PF) 10 UNIT/ML INTRAVENOUS SYRINGE 5 ML: at 01:58

## 2020-01-01 RX ADMIN — SODIUM CHLORIDE 500 ML: 9 INJECTION, SOLUTION INTRAVENOUS at 07:30

## 2020-01-01 RX ADMIN — SODIUM CHLORIDE 1000 ML: 9 INJECTION, SOLUTION INTRAVENOUS at 19:25

## 2020-01-01 RX ADMIN — TAZOBACTAM SODIUM AND PIPERACILLIN SODIUM 4.5 G: 500; 4 INJECTION, SOLUTION INTRAVENOUS at 02:48

## 2020-01-01 RX ADMIN — TAZOBACTAM SODIUM AND PIPERACILLIN SODIUM 3.38 G: 375; 3 INJECTION, SOLUTION INTRAVENOUS at 06:22

## 2020-01-01 RX ADMIN — TAZOBACTAM SODIUM AND PIPERACILLIN SODIUM 4.5 G: 500; 4 INJECTION, SOLUTION INTRAVENOUS at 09:34

## 2020-01-01 RX ADMIN — MAGNESIUM SULFATE IN WATER 4 G: 40 INJECTION, SOLUTION INTRAVENOUS at 13:59

## 2020-01-01 RX ADMIN — Medication 25 MG: at 09:35

## 2020-01-01 RX ADMIN — DOCUSATE SODIUM 100 MG: 100 CAPSULE, LIQUID FILLED ORAL at 19:58

## 2020-01-01 RX ADMIN — METOPROLOL TARTRATE 50 MG: 50 TABLET, FILM COATED ORAL at 09:35

## 2020-01-01 RX ADMIN — Medication 10 MEQ: at 05:32

## 2020-01-01 RX ADMIN — DEXTROSE AND SODIUM CHLORIDE: 10; .45 INJECTION, SOLUTION INTRAVENOUS at 14:15

## 2020-01-01 RX ADMIN — DEXTROSE AND SODIUM CHLORIDE: 10; .45 INJECTION, SOLUTION INTRAVENOUS at 03:45

## 2020-01-01 RX ADMIN — METOPROLOL TARTRATE 5 MG: 5 INJECTION INTRAVENOUS at 18:26

## 2020-01-01 RX ADMIN — TAZOBACTAM SODIUM AND PIPERACILLIN SODIUM 3.38 G: 375; 3 INJECTION, SOLUTION INTRAVENOUS at 12:32

## 2020-01-01 RX ADMIN — SODIUM CHLORIDE: 4.5 INJECTION, SOLUTION INTRAVENOUS at 17:51

## 2020-01-01 RX ADMIN — POTASSIUM CHLORIDE 40 MEQ: 1500 TABLET, EXTENDED RELEASE ORAL at 09:03

## 2020-01-01 RX ADMIN — TAZOBACTAM SODIUM AND PIPERACILLIN SODIUM 3.38 G: 375; 3 INJECTION, SOLUTION INTRAVENOUS at 00:38

## 2020-01-01 RX ADMIN — SODIUM CHLORIDE: 9 INJECTION, SOLUTION INTRAVENOUS at 22:43

## 2020-01-01 RX ADMIN — DOCUSATE SODIUM 100 MG: 100 CAPSULE, LIQUID FILLED ORAL at 09:04

## 2020-01-01 RX ADMIN — POTASSIUM CHLORIDE 20 MEQ: 1500 TABLET, EXTENDED RELEASE ORAL at 12:03

## 2020-01-01 RX ADMIN — PANTOPRAZOLE SODIUM 40 MG: 40 TABLET, DELAYED RELEASE ORAL at 06:22

## 2020-01-01 RX ADMIN — DEXTROSE MONOHYDRATE 25 ML: 25 INJECTION, SOLUTION INTRAVENOUS at 05:06

## 2020-01-01 RX ADMIN — DEXTROSE AND SODIUM CHLORIDE: 10; .45 INJECTION, SOLUTION INTRAVENOUS at 20:48

## 2020-01-01 RX ADMIN — ATORVASTATIN CALCIUM 20 MG: 10 TABLET, FILM COATED ORAL at 21:36

## 2020-01-01 RX ADMIN — SODIUM CHLORIDE: 9 INJECTION, SOLUTION INTRAVENOUS at 18:08

## 2020-01-01 RX ADMIN — IPRATROPIUM BROMIDE AND ALBUTEROL SULFATE 3 ML: .5; 3 SOLUTION RESPIRATORY (INHALATION) at 18:04

## 2020-01-01 RX ADMIN — TAZOBACTAM SODIUM AND PIPERACILLIN SODIUM 4.5 G: 500; 4 INJECTION, SOLUTION INTRAVENOUS at 13:59

## 2020-01-01 RX ADMIN — MELATONIN 2000 UNITS: at 09:04

## 2020-01-01 RX ADMIN — PANTOPRAZOLE SODIUM 40 MG: 40 TABLET, DELAYED RELEASE ORAL at 09:35

## 2020-01-01 RX ADMIN — SODIUM CHLORIDE 1000 ML: 9 INJECTION, SOLUTION INTRAVENOUS at 06:51

## 2020-01-01 RX ADMIN — Medication 10 MEQ: at 06:43

## 2020-01-01 RX ADMIN — Medication 10 MEQ: at 04:27

## 2020-01-01 RX ADMIN — LEVETIRACETAM 500 MG: 100 INJECTION, SOLUTION INTRAVENOUS at 01:12

## 2020-01-01 RX ADMIN — HEPARIN, PORCINE (PF) 10 UNIT/ML INTRAVENOUS SYRINGE 5 ML: at 20:18

## 2020-01-01 RX ADMIN — FUROSEMIDE 40 MG: 10 INJECTION, SOLUTION INTRAMUSCULAR; INTRAVENOUS at 11:38

## 2020-01-01 RX ADMIN — TAZOBACTAM SODIUM AND PIPERACILLIN SODIUM 3.38 G: 375; 3 INJECTION, SOLUTION INTRAVENOUS at 18:57

## 2020-01-01 RX ADMIN — METOPROLOL TARTRATE 50 MG: 50 TABLET, FILM COATED ORAL at 21:36

## 2020-01-01 RX ADMIN — TAZOBACTAM SODIUM AND PIPERACILLIN SODIUM 4.5 G: 500; 4 INJECTION, SOLUTION INTRAVENOUS at 20:08

## 2020-01-01 RX ADMIN — SODIUM CHLORIDE: 9 INJECTION, SOLUTION INTRAVENOUS at 04:29

## 2020-01-01 RX ADMIN — IPRATROPIUM BROMIDE AND ALBUTEROL SULFATE 3 ML: .5; 3 SOLUTION RESPIRATORY (INHALATION) at 11:45

## 2020-01-01 RX ADMIN — SODIUM CHLORIDE 1000 ML: 9 INJECTION, SOLUTION INTRAVENOUS at 18:17

## 2020-01-01 RX ADMIN — HEPARIN, PORCINE (PF) 10 UNIT/ML INTRAVENOUS SYRINGE 5 ML: at 20:19

## 2020-01-01 RX ADMIN — FENTANYL CITRATE 50 MCG: 50 INJECTION, SOLUTION INTRAMUSCULAR; INTRAVENOUS at 14:30

## 2020-01-01 RX ADMIN — SODIUM CHLORIDE 500 ML: 9 INJECTION, SOLUTION INTRAVENOUS at 04:01

## 2020-01-01 RX ADMIN — Medication 10 MEQ: at 03:02

## 2020-01-01 RX ADMIN — TAZOBACTAM SODIUM AND PIPERACILLIN SODIUM 3.38 G: 375; 3 INJECTION, SOLUTION INTRAVENOUS at 06:24

## 2020-01-01 RX ADMIN — TAZOBACTAM SODIUM AND PIPERACILLIN SODIUM 3.38 G: 375; 3 INJECTION, SOLUTION INTRAVENOUS at 00:16

## 2020-01-01 RX ADMIN — PANTOPRAZOLE SODIUM 40 MG: 40 TABLET, DELAYED RELEASE ORAL at 10:44

## 2020-01-01 RX ADMIN — FENTANYL CITRATE 50 MCG: 50 INJECTION, SOLUTION INTRAMUSCULAR; INTRAVENOUS at 14:46

## 2020-01-01 RX ADMIN — TAZOBACTAM SODIUM AND PIPERACILLIN SODIUM 3.38 G: 375; 3 INJECTION, SOLUTION INTRAVENOUS at 18:07

## 2020-01-01 RX ADMIN — METOPROLOL TARTRATE 50 MG: 50 TABLET, FILM COATED ORAL at 09:04

## 2020-01-01 RX ADMIN — LEVETIRACETAM 500 MG: 100 INJECTION, SOLUTION INTRAVENOUS at 12:36

## 2020-01-01 RX ADMIN — TAZOBACTAM SODIUM AND PIPERACILLIN SODIUM 4.5 G: 500; 4 INJECTION, SOLUTION INTRAVENOUS at 18:47

## 2020-01-01 RX ADMIN — MAGNESIUM SULFATE IN WATER 4 G: 40 INJECTION, SOLUTION INTRAVENOUS at 09:42

## 2020-01-01 RX ADMIN — SODIUM CHLORIDE, POTASSIUM CHLORIDE, SODIUM LACTATE AND CALCIUM CHLORIDE 500 ML: 600; 310; 30; 20 INJECTION, SOLUTION INTRAVENOUS at 10:16

## 2020-01-01 RX ADMIN — TAZOBACTAM SODIUM AND PIPERACILLIN SODIUM 3.38 G: 375; 3 INJECTION, SOLUTION INTRAVENOUS at 06:16

## 2020-01-01 RX ADMIN — MELATONIN 2000 UNITS: at 09:35

## 2020-01-01 RX ADMIN — SODIUM CHLORIDE: 9 INJECTION, SOLUTION INTRAVENOUS at 05:10

## 2020-01-01 RX ADMIN — TAZOBACTAM SODIUM AND PIPERACILLIN SODIUM 3.38 G: 375; 3 INJECTION, SOLUTION INTRAVENOUS at 12:49

## 2020-01-01 RX ADMIN — Medication 12.5 MG: at 12:03

## 2020-01-01 RX ADMIN — OLANZAPINE 2.5 MG: 10 INJECTION, POWDER, LYOPHILIZED, FOR SOLUTION INTRAMUSCULAR at 14:19

## 2020-01-01 RX ADMIN — MELATONIN 2000 UNITS: at 10:44

## 2020-01-01 RX ADMIN — METOPROLOL TARTRATE 25 MG: 25 TABLET, FILM COATED ORAL at 03:18

## 2020-01-01 RX ADMIN — DOCUSATE SODIUM 100 MG: 100 CAPSULE, LIQUID FILLED ORAL at 09:36

## 2020-01-01 ASSESSMENT — ACTIVITIES OF DAILY LIVING (ADL)
ADLS_ACUITY_SCORE: 36
ADLS_ACUITY_SCORE: 36
ADLS_ACUITY_SCORE: 38
ADLS_ACUITY_SCORE: 36
ADLS_ACUITY_SCORE: 41
ADLS_ACUITY_SCORE: 36
ADLS_ACUITY_SCORE: 38
ADLS_ACUITY_SCORE: 36
TOILETING: 4-->COMPLETELY DEPENDENT
ADLS_ACUITY_SCORE: 38
ADLS_ACUITY_SCORE: 33
ADLS_ACUITY_SCORE: 38
ADLS_ACUITY_SCORE: 41
ADLS_ACUITY_SCORE: 36
ADLS_ACUITY_SCORE: 38
COGNITION: 0 - NO COGNITION ISSUES REPORTED
ADLS_ACUITY_SCORE: 38
ADLS_ACUITY_SCORE: 36
ADLS_ACUITY_SCORE: 36
BATHING: 4-->COMPLETELY DEPENDENT
ADLS_ACUITY_SCORE: 38
ADLS_ACUITY_SCORE: 41
ADLS_ACUITY_SCORE: 36
RETIRED_COMMUNICATION: 2-->DIFFICULTY UNDERSTANDING AND SPEAKING (NOT RELATED TO LANGUAGE BARRIER)
RETIRED_EATING: 3-->ASSISTIVE EQUIPMENT AND PERSON
ADLS_ACUITY_SCORE: 38
ADLS_ACUITY_SCORE: 38
ADLS_ACUITY_SCORE: 36
ADLS_ACUITY_SCORE: 38
DRESS: 4-->COMPLETELY DEPENDENT
ADLS_ACUITY_SCORE: 38
ADLS_ACUITY_SCORE: 41
ADLS_ACUITY_SCORE: 36
ADLS_ACUITY_SCORE: 38
ADLS_ACUITY_SCORE: 41
ADLS_ACUITY_SCORE: 38
TRANSFERRING: 4-->COMPLETELY DEPENDENT
AMBULATION: 4-->COMPLETELY DEPENDENT
ADLS_ACUITY_SCORE: 35
ADLS_ACUITY_SCORE: 36

## 2020-01-01 ASSESSMENT — MIFFLIN-ST. JEOR
SCORE: 1669.18
SCORE: 1674.62
SCORE: 1674.62
SCORE: 1591.25
SCORE: 1683.25
SCORE: 1679.16
SCORE: 1675.25
SCORE: 1662.83
SCORE: 1682.32
SCORE: 1706.25

## 2020-01-01 ASSESSMENT — COPD QUESTIONNAIRES: COPD: 1

## 2020-01-09 NOTE — LETTER
1/9/2020        RE: Diallo Villarreal  [address]  [address]  [place] [address]        [place] [facility]    [facility] Medical Record Number:  [id number]  Place of Service where encounter took place[facility] [[id number][facility]    HPI:    Diallo Villarreal is a 86 year old  (8/31/1932), who is being seen today for a federally mandated E/M visit.  HPI information obtained from: facility staff, patient report and [facility] chart review    Today's concerns are:  - RN reports ongoing boil over the groin area, recurrent, GNP recommends warm compress, however, Resident is non compliant with the recommendation.   - Mr. Villarreal seen and examined today, reports doing pretty good, recovering from a bad cold, reports appetite is improving. Not concerned about the boil over the groin for it does not hurt if it is left alone. Denies fever or chills.   - Denies CP, edema, SOB..   ---------------------------------  - - Past Medical, social, family histories, medications, and allergies reviewed and updated  - Medications reviewed: in the chart and EHR.   - Case Management:   I have reviewed the care plan and MDS and do agree with the plan. Patient's desire to return to the community is not present.  Information reviewed:  Medications, vital signs, orders, and nursing notes.    MEDICATIONS:  Current Outpatient Medications   Medication Sig Dispense Refill     acetaminophen (TYLENOL) 500 MG tablet Take 2 tablets (1,000 mg) by mouth 3 times daily as needed for mild pain       albuterol (PROVENTIL) (2.5 MG/3ML) 0.083% neb solution Take 2.5 mg by nebulization every 4 hours as needed for shortness of breath / dyspnea or wheezing       aspirin 81 MG EC tablet Take 81 mg by mouth daily        atorvastatin (LIPITOR) 40 MG tablet Take 40 mg by mouth daily       cetirizine (ZYRTEC) 10 MG tablet Take 5 mg by mouth daily as needed        cinacalcet (SENSIPAR) 30 MG tablet Take 30 mg by mouth daily        "ferrous sulfate (FEROSUL) 325 (65 Fe) MG tablet Take 325 mg by mouth daily (with breakfast)       furosemide (LASIX) 20 MG tablet Take 10 mg by mouth daily        metoprolol tartrate (LOPRESSOR) 50 MG tablet Take 50 mg by mouth 2 times daily       mometasone (ELOCON) 0.1 % external ointment Apply topically 2 times daily       nitroGLYcerin (NITROSTAT) 0.4 MG sublingual tablet Place 0.4 mg under the tongue every 5 minutes as needed for chest pain For chest pain place 1 tablet under the tongue every 5 minutes for 3 doses. If symptoms persist 5 minutes after 1st dose call 911.       pantoprazole (PROTONIX) 40 MG EC tablet Take 40 mg by mouth daily       vitamin D3 (CHOLECALCIFEROL) 1000 units (25 mcg) tablet Take 2,000 Units by mouth daily       lidocaine (LMX4) 4 % external cream Apply topically 2 times daily Also BID PRN       ROS: 4 point ROS including Respiratory, CV, GI and , other than that noted in the HPI,  is negative    Exam:  Vitals: BP (!) 140/76   Pulse 71   Temp 97.4  F (36.3  C)   Resp 18   Ht 1.88 m (6' 2\")   Wt 93 kg (205 lb)   SpO2 98%   BMI 26.32 kg/m     BMI= Body mass index is 26.32 kg/m .   GENERAL APPEARANCE:  in no distress, cooperative  RESP:  lungs clear to auscultation, no wheezing  CV:  S1S2 audible, irregular HR, no murmur appreciated.   ABDOMEN:  soft, NT/ND, BS audible. no mass appreciated on palpation.   M/S:   Kyphosis,   SKIN:  No rash.  SPC in place. Indurated erythematic skin lesion over left groin area, no abscess formation, slight tenderness, surrounded with blanchable erythema.  NEURO:   Strength 4/5 right lower extremity and left upper extremity, 5/5 on the left side  PSYCH:  Fair insight, judgement and memory, affect and mood normal       Lab/Diagnostic data: Reviewed with patient in office    =========================================================  ASSESSMENT/PLAN  Spastic hemiplegic cerebral palsy (H)  Hx of Concussion without loss of consciousness  Frailty  - " chronic right sided weakness,   - Significant  Deficits requiring NH placement. Requiring extensive assistance from nursing. Up for meals only o/w spends the day resting in bed  - continue supportive care.        Coronary atherosclerosis due to lipid rich plaque with stenting to LAD/RCA  Essential HTN  Hx of left carotid endarterectomy (2002)  AAA w/o rupture: 6.7 cm, not amenable to surgery  - at baseline. Continue to follow on Cardio/VS's recommendations.      Obstruction, uropathy  Chronic Indwelling Catheter:  Recurrent UTI  Supra pubic catheter  - on Cardura and flomax.       Left groin boil, with surrounding erythema:    - has hx of MRSA:  - does not seem interested in further management. Monitor closely. Given recurrent boil, any future ID would required breaking down the abscess pockets and packing for a couple of days.     Cognitive impairment  - SLUM 10/15.  - Continue to anticipate needs. Chronic condition, ongoing decline expected.   -  Continue to provide redirection and reassurance as needed. Maintain safe living situation with goals focused on comfort.      Order: - See above, otherwise, continue the rest of the current POC.       Electronically signed by:  Ramírez Ibrahim MD           Sincerely,        Ramírez Ibrahim MD

## 2020-01-09 NOTE — PROGRESS NOTES
NH Visit    Date - 11/7/19    Sunjective:   Patient is an 87-year-old white male with CP who is in a wheelchair who had a suprapubic catheter placed many years ago for obstructive uropathy.  Recently the wound nurses noted some hyper granulation and bleeding around the base of the catheter which I am now asked to see him.  Currently he is resting comfortably in a wheelchair.      Was seen 1 month ago. Tx with AGNO4.  Some improvement.  Nurses feel another tx necessary.      Obejective:  B/P: Data Unavailable, T: Data Unavailable, P: Data Unavailable, R: Data Unavailable  Abd: Soft, non tender, non distended, non tender, no masses, suprapubic catheter in place with hypergranulation (less than last visit) - AGNO4 applied.        Assessment/plan:  This is a 87-year-old gentleman with a suprapubic catheter and hypergranulation tissue from the catheter around the tube entry site.  Decreased from last visit  AGNO4 was applied today.  The area can be then covered with gauze.  He may require repeat treatments should the granulation tissue not resolve.  I will follow-up with him on my next visit to the nursing home.    Rasta Bliss MD, FACS

## 2020-01-10 NOTE — PROGRESS NOTES
[place] [facility]  [place] Medical Record Number:  [id number]  Place of Service where encounter took place:  [facility] ([facility]) [[id number]]  Chief Complaint   Patient presents with     Nursing Home Acute       HPI:    Diallo Villarreal  is a 87 year old (8/31/1932), who is being seen today for an episodic care visit.  HPI information obtained from: facility chart records, facility staff, patient report and [facility] chart review. Today's concern is:     Boil  Hx MRSA infection  Suprapubic catheter (H)  Spastic hemiplegic cerebral palsy (H)     Patient is a pleasant gentleman who resides at the [facility] SNF who has recurrent history of boils/cellulitis with MRSA.  Nursing reporting patient has another boil to his lower abdomen, near his SPC site. They have been hot-packing it without resolution.    Nursing reports patient often declines cares and allowing staff to keep groin area clean and changed.      Patient today in bed and reports he has a boil to lower abdomen, endorses it is painful/tender and while is not excited about lancing and antibiotics, understands this is necessary and wishes for yogurt if needing antibiotics.     Past Medical and Surgical History reviewed in Epic today.    MEDICATIONS:  Current Outpatient Medications   Medication Sig Dispense Refill     acetaminophen (TYLENOL) 500 MG tablet Take 2 tablets (1,000 mg) by mouth 3 times daily as needed for mild pain       albuterol (PROVENTIL) (2.5 MG/3ML) 0.083% neb solution Take 2.5 mg by nebulization every 4 hours as needed for shortness of breath / dyspnea or wheezing       aspirin 81 MG EC tablet Take 81 mg by mouth daily        atorvastatin (LIPITOR) 40 MG tablet Take 40 mg by mouth daily       cetirizine (ZYRTEC) 10 MG tablet Take 5 mg by mouth daily as needed        cinacalcet (SENSIPAR) 30 MG tablet Take 30 mg by mouth daily       ferrous sulfate (FEROSUL) 325 (65 Fe) MG tablet Take 325 mg by mouth daily (with  "breakfast)       furosemide (LASIX) 20 MG tablet Take 10 mg by mouth daily        lidocaine (LMX4) 4 % external cream Apply topically 2 times daily Also BID PRN       metoprolol tartrate (LOPRESSOR) 50 MG tablet Take 50 mg by mouth 2 times daily       mometasone (ELOCON) 0.1 % external ointment Apply topically 2 times daily       nitroGLYcerin (NITROSTAT) 0.4 MG sublingual tablet Place 0.4 mg under the tongue every 5 minutes as needed for chest pain For chest pain place 1 tablet under the tongue every 5 minutes for 3 doses. If symptoms persist 5 minutes after 1st dose call 911.       pantoprazole (PROTONIX) 40 MG EC tablet Take 40 mg by mouth daily       vitamin D3 (CHOLECALCIFEROL) 1000 units (25 mcg) tablet Take 2,000 Units by mouth daily         REVIEW OF SYSTEMS:  Limited secondary to cognitive impairment but today pt reports 4 point ROS including Respiratory, CV, GI and , other than that noted in the HPI,  is negative    Objective:  BP (!) 140/78   Pulse 70   Temp 96.5  F (35.8  C)   Resp 18   Ht 1.88 m (6' 2\")   Wt 93 kg (205 lb)   SpO2 98%   BMI 26.32 kg/m    Exam:  GENERAL APPEARANCE:  Alert, in no distress  RESP:  respiratory effort normal, no respiratory distress  CV:  Scant to mild LE peripheral edema  ABDOMEN:  nondistended  M/S:   Gait and station with w/c for mobility, has spastic hemiplegic CP, Digits and nails with arthritic changes, reduced muscle mass  SKIN:  Inspection and Palpation of skin and subcutaneous tissue with lower abdomen boil that is about 3 inches from his SPC site.  It is red, inflamed and appears infected.  When lanced, purulent drainage is expressed.    PSYCH:  insight and judgement, memory with mild impairment, affect and mood normal, follows commands readily           Labs:   CBC RESULTS:   Recent Labs   Lab Test 11/15/19  0720 10/16/19  0832   WBC 6.4 7.3   RBC 3.48* 3.48*   HGB 9.6* 9.8*   HCT 30.2* 30.1*   MCV 87 87   MCH 27.6 28.2   MCHC 31.8 32.6   RDW 15.9* 17.5* "    198       Last Basic Metabolic Panel:  Recent Labs   Lab Test 11/18/19  0803 10/01/19  1340    145*   POTASSIUM 4.1 4.3   CHLORIDE 106 111*   MARIN 8.9 9.4   CO2 28 25   BUN 15 21   CR 0.94 1.38*   GLC 76 83       Liver Function Studies - No results for input(s): PROTTOTAL, ALBUMIN, BILITOTAL, ALKPHOS, AST, ALT, BILIDIRECT in the last 62251 hours.    TSH   Date Value Ref Range Status   03/15/2019 1.34 0.40 - 4.00 mU/L Final   ]    No results found for: A1C        ASSESSMENT/PLAN:     Boil  Hx MRSA infection  Suprapubic catheter (H)  Spastic hemiplegic cerebral palsy (H)     Topical lidocaine to area with time to soak in and provide analgesia.  Area cleansed with betadine and then lanced mildly to allow for expression of purulent drainage without too much of an incision.  Some bleeding occurred but most drainage was purulent and from boil.  Boil was smaller once expressed but d/t location and resistance to cares, along with history of MRSA infection and proximity to SPC site, will elect to give antibiotics.  Can monitor for resistance (as he also has a history of).  Significant amount of time for procedure to assure comfort and completeness.  Patient tolerated procedure well. Area was dressed with gauze 4 x 4 and taped to place.      Can order yogurt as requested by patient.   Will need BID dressing changes until resolved - to assure healing and also cleanliness.  I discussed this with Diallo who was in agreement.     Orders written by provider at facility and transcribed by : Magaly Polo MA  1.  Clindamycin 300 mg po TID x 5 days.  Dx: boil w/hx MRSA infection (update NP on day 4 or 5 re: status to assess if antibiotics to extend)  2.  Yogurt 1 serving every day while on Clindamycin.  Dx: probiotics  3.  Boil/groin dressing change BID.  Ok to discontinue once healed.  Clean with wound cleanser.        Electronically signed by:  ROCÍO Larsen CNP

## 2020-01-10 NOTE — LETTER
1/10/2020        RE: Diallo Villarreal  [address]  [address]  [place] [address]        [facility]  [place] Medical Record Number:  [id number]  Place of Service where encounter took place:  [facility] ([facility] [[id number]]  Chief Complaint   Patient presents with     Nursing Home Acute       HPI:    Diallo Villarreal  is a 87 year old (8/31/1932), who is being seen today for an episodic care visit.  HPI information obtained from: facility chart records, facility staff, patient report and [facility] chart review. Today's concern is:     Boil  Hx MRSA infection  Suprapubic catheter (H)  Spastic hemiplegic cerebral palsy (H)     Patient is a pleasant gentleman who resides at the [facility] who has recurrent history of boils/cellulitis with MRSA.  Nursing reporting patient has another boil to his lower abdomen, near his SPC site. They have been hot-packing it without resolution.    Nursing reports patient often declines cares and allowing staff to keep groin area clean and changed.      Patient today in bed and reports he has a boil to lower abdomen, endorses it is painful/tender and while is not excited about lancing and antibiotics, understands this is necessary and wishes for yogurt if needing antibiotics.     Past Medical and Surgical History reviewed in Epic today.    MEDICATIONS:  Current Outpatient Medications   Medication Sig Dispense Refill     acetaminophen (TYLENOL) 500 MG tablet Take 2 tablets (1,000 mg) by mouth 3 times daily as needed for mild pain       albuterol (PROVENTIL) (2.5 MG/3ML) 0.083% neb solution Take 2.5 mg by nebulization every 4 hours as needed for shortness of breath / dyspnea or wheezing       aspirin 81 MG EC tablet Take 81 mg by mouth daily        atorvastatin (LIPITOR) 40 MG tablet Take 40 mg by mouth daily       cetirizine (ZYRTEC) 10 MG tablet Take 5 mg by mouth daily as needed        cinacalcet (SENSIPAR) 30 MG tablet Take 30 mg by mouth  "daily       ferrous sulfate (FEROSUL) 325 (65 Fe) MG tablet Take 325 mg by mouth daily (with breakfast)       furosemide (LASIX) 20 MG tablet Take 10 mg by mouth daily        lidocaine (LMX4) 4 % external cream Apply topically 2 times daily Also BID PRN       metoprolol tartrate (LOPRESSOR) 50 MG tablet Take 50 mg by mouth 2 times daily       mometasone (ELOCON) 0.1 % external ointment Apply topically 2 times daily       nitroGLYcerin (NITROSTAT) 0.4 MG sublingual tablet Place 0.4 mg under the tongue every 5 minutes as needed for chest pain For chest pain place 1 tablet under the tongue every 5 minutes for 3 doses. If symptoms persist 5 minutes after 1st dose call 911.       pantoprazole (PROTONIX) 40 MG EC tablet Take 40 mg by mouth daily       vitamin D3 (CHOLECALCIFEROL) 1000 units (25 mcg) tablet Take 2,000 Units by mouth daily         REVIEW OF SYSTEMS:  Limited secondary to cognitive impairment but today pt reports 4 point ROS including Respiratory, CV, GI and , other than that noted in the HPI,  is negative    Objective:  BP (!) 140/78   Pulse 70   Temp 96.5  F (35.8  C)   Resp 18   Ht 1.88 m (6' 2\")   Wt 93 kg (205 lb)   SpO2 98%   BMI 26.32 kg/m     Exam:  GENERAL APPEARANCE:  Alert, in no distress  RESP:  respiratory effort normal, no respiratory distress  CV:  Scant to mild LE peripheral edema  ABDOMEN:  nondistended  M/S:   Gait and station with w/c for mobility, has spastic hemiplegic CP, Digits and nails with arthritic changes, reduced muscle mass  SKIN:  Inspection and Palpation of skin and subcutaneous tissue with lower abdomen boil that is about 3 inches from his SPC site.  It is red, inflamed and appears infected.  When lanced, purulent drainage is expressed.    PSYCH:  insight and judgement, memory with mild impairment, affect and mood normal, follows commands readily           Labs:   CBC RESULTS:   Recent Labs   Lab Test 11/15/19  0720 10/16/19  0832   WBC 6.4 7.3   RBC 3.48* 3.48* "   HGB 9.6* 9.8*   HCT 30.2* 30.1*   MCV 87 87   MCH 27.6 28.2   MCHC 31.8 32.6   RDW 15.9* 17.5*    198       Last Basic Metabolic Panel:  Recent Labs   Lab Test 11/18/19  0803 10/01/19  1340    145*   POTASSIUM 4.1 4.3   CHLORIDE 106 111*   MARIN 8.9 9.4   CO2 28 25   BUN 15 21   CR 0.94 1.38*   GLC 76 83       Liver Function Studies - No results for input(s): PROTTOTAL, ALBUMIN, BILITOTAL, ALKPHOS, AST, ALT, BILIDIRECT in the last 27374 hours.    TSH   Date Value Ref Range Status   03/15/2019 1.34 0.40 - 4.00 mU/L Final   ]    No results found for: A1C        ASSESSMENT/PLAN:     Boil  Hx MRSA infection  Suprapubic catheter (H)  Spastic hemiplegic cerebral palsy (H)     Topical lidocaine to area with time to soak in and provide analgesia.  Area cleansed with betadine and then lanced mildly to allow for expression of purulent drainage without too much of an incision.  Some bleeding occurred but most drainage was purulent and from boil.  Boil was smaller once expressed but d/t location and resistance to cares, along with history of MRSA infection and proximity to SPC site, will elect to give antibiotics.  Can monitor for resistance (as he also has a history of).  Significant amount of time for procedure to assure comfort and completeness.  Patient tolerated procedure well. Area was dressed with gauze 4 x 4 and taped to place.      Can order yogurt as requested by patient.   Will need BID dressing changes until resolved - to assure healing and also cleanliness.  I discussed this with Diallo who was in agreement.     Orders written by provider at facility and transcribed by : Magaly Polo MA  1.  Clindamycin 300 mg po TID x 5 days.  Dx: boil w/hx MRSA infection (update NP on day 4 or 5 re: status to assess if antibiotics to extend)  2.  Yogurt 1 serving every day while on Clindamycin.  Dx: probiotics  3.  Boil/groin dressing change BID.  Ok to discontinue once healed.  Clean with wound  cleanser.        Electronically signed by:  ROCÍO Larsen CNP               Sincerely,        ROCÍO Larsen CNP

## 2020-01-18 NOTE — PROGRESS NOTES
[facility]    [place] Medical Record Number:  [id number]  Place of Service where encounter took place:  [facility] [[id number][facility]    HPI:    Diallo Villarreal is a 86 year old  (8/31/1932), who is being seen today for a federally mandated E/M visit.  HPI information obtained from: facility staff, patient report and [facility] chart review    Today's concerns are:  - RN reports ongoing boil over the groin area, recurrent, GNP recommends warm compress, however, Resident is non compliant with the recommendation.   - Mr. Villarreal seen and examined today, reports doing pretty good, recovering from a bad cold, reports appetite is improving. Not concerned about the boil over the groin for it does not hurt if it is left alone. Denies fever or chills.   - Denies CP, edema, SOB..   ---------------------------------  - - Past Medical, social, family histories, medications, and allergies reviewed and updated  - Medications reviewed: in the chart and EHR.   - Case Management:   I have reviewed the care plan and MDS and do agree with the plan. Patient's desire to return to the community is not present.  Information reviewed:  Medications, vital signs, orders, and nursing notes.    MEDICATIONS:  Current Outpatient Medications   Medication Sig Dispense Refill     acetaminophen (TYLENOL) 500 MG tablet Take 2 tablets (1,000 mg) by mouth 3 times daily as needed for mild pain       albuterol (PROVENTIL) (2.5 MG/3ML) 0.083% neb solution Take 2.5 mg by nebulization every 4 hours as needed for shortness of breath / dyspnea or wheezing       aspirin 81 MG EC tablet Take 81 mg by mouth daily        atorvastatin (LIPITOR) 40 MG tablet Take 40 mg by mouth daily       cetirizine (ZYRTEC) 10 MG tablet Take 5 mg by mouth daily as needed        cinacalcet (SENSIPAR) 30 MG tablet Take 30 mg by mouth daily       ferrous sulfate (FEROSUL) 325 (65 Fe) MG tablet Take 325 mg by mouth daily (with breakfast)    "    furosemide (LASIX) 20 MG tablet Take 10 mg by mouth daily        metoprolol tartrate (LOPRESSOR) 50 MG tablet Take 50 mg by mouth 2 times daily       mometasone (ELOCON) 0.1 % external ointment Apply topically 2 times daily       nitroGLYcerin (NITROSTAT) 0.4 MG sublingual tablet Place 0.4 mg under the tongue every 5 minutes as needed for chest pain For chest pain place 1 tablet under the tongue every 5 minutes for 3 doses. If symptoms persist 5 minutes after 1st dose call 911.       pantoprazole (PROTONIX) 40 MG EC tablet Take 40 mg by mouth daily       vitamin D3 (CHOLECALCIFEROL) 1000 units (25 mcg) tablet Take 2,000 Units by mouth daily       lidocaine (LMX4) 4 % external cream Apply topically 2 times daily Also BID PRN       ROS: 4 point ROS including Respiratory, CV, GI and , other than that noted in the HPI,  is negative    Exam:  Vitals: BP (!) 140/76   Pulse 71   Temp 97.4  F (36.3  C)   Resp 18   Ht 1.88 m (6' 2\")   Wt 93 kg (205 lb)   SpO2 98%   BMI 26.32 kg/m    BMI= Body mass index is 26.32 kg/m .   GENERAL APPEARANCE:  in no distress, cooperative  RESP:  lungs clear to auscultation, no wheezing  CV:  S1S2 audible, irregular HR, no murmur appreciated.   ABDOMEN:  soft, NT/ND, BS audible. no mass appreciated on palpation.   M/S:   Kyphosis,   SKIN:  No rash.  SPC in place. Indurated erythematic skin lesion over left groin area, no abscess formation, slight tenderness, surrounded with blanchable erythema.  NEURO:   Strength 4/5 right lower extremity and left upper extremity, 5/5 on the left side  PSYCH:  Fair insight, judgement and memory, affect and mood normal       Lab/Diagnostic data: Reviewed with patient in office    =========================================================  ASSESSMENT/PLAN  Spastic hemiplegic cerebral palsy (H)  Hx of Concussion without loss of consciousness  Frailty  - chronic right sided weakness,   - Significant  Deficits requiring NH placement. Requiring extensive " assistance from nursing. Up for meals only o/w spends the day resting in bed  - continue supportive care.        Coronary atherosclerosis due to lipid rich plaque with stenting to LAD/RCA  Essential HTN  Hx of left carotid endarterectomy (2002)  AAA w/o rupture: 6.7 cm, not amenable to surgery  - at baseline. Continue to follow on Cardio/VS's recommendations.      Obstruction, uropathy  Chronic Indwelling Catheter:  Recurrent UTI  Supra pubic catheter  - on Cardura and flomax.       Left groin boil, with surrounding erythema:    - has hx of MRSA:  - does not seem interested in further management. Monitor closely. Given recurrent boil, any future ID would required breaking down the abscess pockets and packing for a couple of days.     Cognitive impairment  - SLUM 10/15.  - Continue to anticipate needs. Chronic condition, ongoing decline expected.   -  Continue to provide redirection and reassurance as needed. Maintain safe living situation with goals focused on comfort.      Order: - See above, otherwise, continue the rest of the current POC.       Electronically signed by:  Ramírez Ibrahim MD

## 2020-03-18 NOTE — LETTER
3/18/2020        RE: Diallo Villarreal  [address]  [address]  [place] [address]        [facility]  Chief Complaint   Patient presents with     group home Regulatory     [place] Medical Record Number:  [id number]  Place of Service where encounter took place:  [facility] ([facility] [[id number]]    HPI:    Diallo Villarreal  is 87 year old (8/31/1932), who is being seen today for a federally mandated E/M visit.  HPI information obtained from: facility chart records, facility staff, patient report and [facility] chart review. Today's concerns are:    1. Spastic hemiplegic cerebral palsy (H)    2. Coronary atherosclerosis due to lipid rich plaque    3. Essential hypertension    4. Obstruction, uropathy    5. Chronic indwelling Armendariz catheter    6. Normocytic anemia      Came to see Diallo today as he was up in his wheelchair in the dining room.  With the COVID 19 pandemic, social distancing occurred.    Diallo responded to his name. Denied chest pain, SOB, cough, or stomach discomfort.    No new issues from nursing's standpoint.      ALLERGIES:Gluten meal and Wheat extract  PAST MEDICAL HISTORY:   has a past medical history of AAA (abdominal aortic aneurysm) (H), Celiac sprue, and HTN (hypertension).  PAST SURGICAL HISTORY:   has a past surgical history that includes tonsillectomy and LEFT CAROTID ENDARTERECTOMY.  FAMILY HISTORY: family history is not on file.  SOCIAL HISTORY:  reports that he has quit smoking. He has never used smokeless tobacco. He reports that he does not drink alcohol.    MEDICATIONS:  Current Outpatient Medications   Medication Sig Dispense Refill     acetaminophen (TYLENOL) 500 MG tablet Take 2 tablets (1,000 mg) by mouth 3 times daily as needed for mild pain       albuterol (PROVENTIL) (2.5 MG/3ML) 0.083% neb solution Take 2.5 mg by nebulization every 4 hours as needed for shortness of breath / dyspnea or wheezing       aspirin 81 MG EC tablet Take 81 mg  "by mouth daily        atorvastatin (LIPITOR) 40 MG tablet Take 40 mg by mouth daily       cetirizine (ZYRTEC) 10 MG tablet Take 5 mg by mouth daily as needed        cinacalcet (SENSIPAR) 30 MG tablet Take 30 mg by mouth daily       ferrous sulfate (FEROSUL) 325 (65 Fe) MG tablet Take 325 mg by mouth daily (with breakfast)       furosemide (LASIX) 20 MG tablet Take 10 mg by mouth daily        lidocaine (LMX4) 4 % external cream Apply topically 2 times daily Also BID PRN       metoprolol tartrate (LOPRESSOR) 50 MG tablet Take 50 mg by mouth 2 times daily       mometasone (ELOCON) 0.1 % external ointment Apply topically 2 times daily       nitroGLYcerin (NITROSTAT) 0.4 MG sublingual tablet Place 0.4 mg under the tongue every 5 minutes as needed for chest pain For chest pain place 1 tablet under the tongue every 5 minutes for 3 doses. If symptoms persist 5 minutes after 1st dose call 911.       pantoprazole (PROTONIX) 40 MG EC tablet Take 40 mg by mouth daily       vitamin D3 (CHOLECALCIFEROL) 1000 units (25 mcg) tablet Take 2,000 Units by mouth daily         Case Management:  I have reviewed the care plan and MDS and do agree with the plan. Patient's desire to return to the community is not present. Information reviewed:  Medications, vital signs, orders, and nursing notes.    ROS:  4 point ROS including Respiratory, CV, GI and , other than that noted in the HPI,  is negative    Vitals:  BP (!) 150/81   Pulse 69   Temp 97.2  F (36.2  C)   Resp 18   Ht 1.88 m (6' 2\")   Wt 93.4 kg (206 lb)   SpO2 98%   BMI 26.45 kg/m    Body mass index is 26.45 kg/m .  Exam:  Diallo was alert and pleasant to speak with.  Oriented x3.  Skin is pink.  No open areas being tended too.  No respiratory distress or coughing.  No oxygen being used.    No swelling of his lower legs.  Catheter tubing and bag seen which showed pale yellow urine with sediment present in tubing.    Assist of one with transfers.    Blood pressures range from " 130-150's/70-80's.    Weight at 206 which is stable over the last 3 months.      Lab/Diagnostic data:   No recent labs for 2020 yet.    ASSESSMENT/PLAN  Spastic hemiplegic cerebral palsy (H)  Currently appears stable with his CP.  Has Tylenol PRN for comfort but spasm medications.  Continue with current care as staff assist him with ADLs and mobility.    Coronary atherosclerosis due to lipid rich plaque  Currently receives ASA 81mg daily and Atorvastatin 40mg daily.  Nitroglycerin PRN for times of chest pain.    Did not see a routine lab coming up for lipids and it has been a year.  Will order lipid panel for next Monday.  1.  Lipid panel for 3/24/2020    Essential hypertension  Blood pressures done weekly minimally with other vitals  Remains on Lopressor 50mg twice a day and Lasix 10mg daily.    No changes.  B/P's managed    Obstruction, uropathy  Chronic indwelling Armendariz catheter  - catheter changed monthly.  Patent at this time with no concerns from nursing.  Appears that he may have frequent UTIs and so staff utilize the UTI stat supplement 30cc daily.    No changes.      Normocytic anemia  FeSO4 325mg po daily.  Lab Results   Component Value Date    HGB 9.6 11/15/2019     No changes at this visit.  Periodically monitored by regular NP.      Orders written by provider at facility and transcribed by : Magaly Polo MA  1.  Lipid panel next Monday 3/23/20.  Dx: ASCVD        Electronically signed by:  ROCÍO Pena CNP              Sincerely,        ROCÍO Pena CNP

## 2020-03-18 NOTE — PROGRESS NOTES
[facility]  Chief Complaint   Patient presents with     retirement Regulatory     [place] Medical Record Number:  [id number]  Place of Service where encounter took place:  [facility] ([facility] [[id number]]    HPI:    Diallo Villarreal  is 87 year old (8/31/1932), who is being seen today for a federally mandated E/M visit.  HPI information obtained from: facility chart records, facility staff, patient report and [facility] chart review. Today's concerns are:    1. Spastic hemiplegic cerebral palsy (H)    2. Coronary atherosclerosis due to lipid rich plaque    3. Essential hypertension    4. Obstruction, uropathy    5. Chronic indwelling Armendariz catheter    6. Normocytic anemia      Came to see Diallo today as he was up in his wheelchair in the dining room.  With the COVID 19 pandemic, social distancing occurred.    Diallo responded to his name. Denied chest pain, SOB, cough, or stomach discomfort.    No new issues from nursing's standpoint.      ALLERGIES:Gluten meal and Wheat extract  PAST MEDICAL HISTORY:   has a past medical history of AAA (abdominal aortic aneurysm) (H), Celiac sprue, and HTN (hypertension).  PAST SURGICAL HISTORY:   has a past surgical history that includes tonsillectomy and LEFT CAROTID ENDARTERECTOMY.  FAMILY HISTORY: family history is not on file.  SOCIAL HISTORY:  reports that he has quit smoking. He has never used smokeless tobacco. He reports that he does not drink alcohol.    MEDICATIONS:  Current Outpatient Medications   Medication Sig Dispense Refill     acetaminophen (TYLENOL) 500 MG tablet Take 2 tablets (1,000 mg) by mouth 3 times daily as needed for mild pain       albuterol (PROVENTIL) (2.5 MG/3ML) 0.083% neb solution Take 2.5 mg by nebulization every 4 hours as needed for shortness of breath / dyspnea or wheezing       aspirin 81 MG EC tablet Take 81 mg by mouth daily        atorvastatin (LIPITOR) 40 MG tablet Take 40 mg by mouth daily        "cetirizine (ZYRTEC) 10 MG tablet Take 5 mg by mouth daily as needed        cinacalcet (SENSIPAR) 30 MG tablet Take 30 mg by mouth daily       ferrous sulfate (FEROSUL) 325 (65 Fe) MG tablet Take 325 mg by mouth daily (with breakfast)       furosemide (LASIX) 20 MG tablet Take 10 mg by mouth daily        lidocaine (LMX4) 4 % external cream Apply topically 2 times daily Also BID PRN       metoprolol tartrate (LOPRESSOR) 50 MG tablet Take 50 mg by mouth 2 times daily       mometasone (ELOCON) 0.1 % external ointment Apply topically 2 times daily       nitroGLYcerin (NITROSTAT) 0.4 MG sublingual tablet Place 0.4 mg under the tongue every 5 minutes as needed for chest pain For chest pain place 1 tablet under the tongue every 5 minutes for 3 doses. If symptoms persist 5 minutes after 1st dose call 911.       pantoprazole (PROTONIX) 40 MG EC tablet Take 40 mg by mouth daily       vitamin D3 (CHOLECALCIFEROL) 1000 units (25 mcg) tablet Take 2,000 Units by mouth daily         Case Management:  I have reviewed the care plan and MDS and do agree with the plan. Patient's desire to return to the community is not present. Information reviewed:  Medications, vital signs, orders, and nursing notes.    ROS:  4 point ROS including Respiratory, CV, GI and , other than that noted in the HPI,  is negative    Vitals:  BP (!) 150/81   Pulse 69   Temp 97.2  F (36.2  C)   Resp 18   Ht 1.88 m (6' 2\")   Wt 93.4 kg (206 lb)   SpO2 98%   BMI 26.45 kg/m    Body mass index is 26.45 kg/m .  Exam:  Diallo was alert and pleasant to speak with.  Oriented x3.  Skin is pink.  No open areas being tended too.  No respiratory distress or coughing.  No oxygen being used.    No swelling of his lower legs.  Catheter tubing and bag seen which showed pale yellow urine with sediment present in tubing.    Assist of one with transfers.    Blood pressures range from 130-150's/70-80's.    Weight at 206 which is stable over the last 3 months.  "     Lab/Diagnostic data:   No recent labs for 2020 yet.    ASSESSMENT/PLAN  Spastic hemiplegic cerebral palsy (H)  Currently appears stable with his CP.  Has Tylenol PRN for comfort but spasm medications.  Continue with current care as staff assist him with ADLs and mobility.    Coronary atherosclerosis due to lipid rich plaque  Currently receives ASA 81mg daily and Atorvastatin 40mg daily.  Nitroglycerin PRN for times of chest pain.    Did not see a routine lab coming up for lipids and it has been a year.  Will order lipid panel for next Monday.  1.  Lipid panel for 3/24/2020    Essential hypertension  Blood pressures done weekly minimally with other vitals  Remains on Lopressor 50mg twice a day and Lasix 10mg daily.    No changes.  B/P's managed    Obstruction, uropathy  Chronic indwelling Armendariz catheter  - catheter changed monthly.  Patent at this time with no concerns from nursing.  Appears that he may have frequent UTIs and so staff utilize the UTI stat supplement 30cc daily.    No changes.      Normocytic anemia  FeSO4 325mg po daily.  Lab Results   Component Value Date    HGB 9.6 11/15/2019     No changes at this visit.  Periodically monitored by regular NP.      Orders written by provider at facility and transcribed by : Magaly Polo MA  1.  Lipid panel next Monday 3/23/20.  Dx: ASCVD        Electronically signed by:  ROCÍO Pena CNP

## 2020-05-07 NOTE — PROGRESS NOTES
"[place] [facility]   Diallo Villarreal is being evaluated via a billable video visit due to the restrictions of the Covid-19 pandemic.   The patient has been notified of following:  \"This video visit will be conducted via a call between you and your provider. We have found that certain health care needs can be provided without the need for an in-person physical exam.  This service lets us provide the care you need with a video conversation. If during the course of the call the provider feels a video visit is not appropriate, you will not be charged for this service.\"   The provider has received verbal consent for a Video Visit from the patient or first contact? Yes  Patient or facility staff would like the video invitation sent by: N/A   Video Start Time: 10:29   Chief Complaint   Patient presents with     Video Visit     Regulatory     HPI:    Diallo Villarreal is a 86 year old  (8/31/1932), who is being seen today for a federally mandated E/M visit.  HPI information obtained from: facility staff, patient report and [facility] chart review    Today's concerns are:  -  Mr. Villarreal seen in the presence of Nurse manager. Resident reports doing fine and has no concern today. Denies muscle spasm, chest pain, SOB.   - RN reports continued to have SPC and no concern. Resident denies suprapubic pain, fever or chills.      ---------------------------------  - - Past Medical, social, family histories, medications, and allergies reviewed and updated  - Medications reviewed: in the chart and EHR.   - Case Management:   I have reviewed the care plan and MDS and do agree with the plan. Patient's desire to return to the community is not present.  Information reviewed:  Medications, vital signs, orders, and nursing notes.    MEDICATIONS:  Current Outpatient Medications   Medication Sig Dispense Refill     acetaminophen (TYLENOL) 500 MG tablet Take 2 tablets (1,000 mg) by mouth 3 times daily as needed for mild " pain       aspirin 81 MG EC tablet Take 81 mg by mouth daily        atorvastatin (LIPITOR) 40 MG tablet Take 40 mg by mouth daily       cetirizine (ZYRTEC) 10 MG tablet Take 5 mg by mouth daily as needed        cinacalcet (SENSIPAR) 30 MG tablet Take 30 mg by mouth daily       ferrous sulfate (FEROSUL) 325 (65 Fe) MG tablet Take 325 mg by mouth daily (with breakfast)       furosemide (LASIX) 20 MG tablet Take 10 mg by mouth daily        lidocaine (LMX4) 4 % external cream Apply topically 2 times daily Also BID PRN       metoprolol tartrate (LOPRESSOR) 50 MG tablet Take 50 mg by mouth 2 times daily       mometasone (ELOCON) 0.1 % external ointment Apply topically 2 times daily       nitroGLYcerin (NITROSTAT) 0.4 MG sublingual tablet Place 0.4 mg under the tongue every 5 minutes as needed for chest pain For chest pain place 1 tablet under the tongue every 5 minutes for 3 doses. If symptoms persist 5 minutes after 1st dose call 911.       pantoprazole (PROTONIX) 40 MG EC tablet Take 40 mg by mouth daily       vitamin D3 (CHOLECALCIFEROL) 1000 units (25 mcg) tablet Take 2,000 Units by mouth daily       ROS: 4 point ROS including Respiratory, CV, GI and , other than that noted in the HPI,  is negative    Exam:  Vitals: /74   Pulse 78   Temp 98  F (36.7  C)   Resp 18   Wt 91.6 kg (202 lb)   SpO2 94%   BMI 25.94 kg/m    BMI= Body mass index is 25.94 kg/m .   Limited visit exam done given COVID-19 precautions.   GENERAL APPEARANCE:  in no distress, cooperative  RESP: unlabored breathing.   M/S:   Kyphosis,   SKIN:  No rash.  SPC in place.   NEURO:   no NFD appreciated on observation.   PSYCH:  Fair insight, judgement and memory, affect and mood normal     Lab/Diagnostic data: Reviewed with patient in office  =========================================================  ASSESSMENT/PLAN    Spastic hemiplegic cerebral palsy (H)  Hx of Concussion without loss of consciousness  Frailty  - chronic right sided weakness,    - Significant  Deficits requiring NH placement. Requiring extensive assistance from nursing. Up for meals only o/w spends the day resting in bed  - continue supportive care.        Coronary atherosclerosis due to lipid rich plaque with stenting to LAD/RCA  Essential HTN  Hx of left carotid endarterectomy (2002)  AAA w/o rupture: 6.7 cm, not amenable to surgery  - at baseline. Continue to follow on Cardio/VS's recommendations.   - lipid profile (March 2020): LDL 40, Cholesterol 98 and TGs 117.  On lipitor 40 mg, will reduce to 20 mg.    - on ASA 81 mg, metoprolol and lasix.      Obstruction, uropathy  Chronic Indwelling Catheter:  Recurrent UTI  Supra pubic catheter  - stable.       Cognitive impairment  - SLUM 10/15.  - Continue to anticipate needs. Chronic condition, ongoing decline expected.   -  Continue to provide redirection and reassurance as needed. Maintain safe living situation with goals focused on comfort.    Chronic normocytic anemia( MARY vs ACD):  - has  Been on ferrous sulfate 325 mg daily since admission to this facility. No iron study in the epic. Last Hb 9.6 (Nov 2019).   - consider repeating HH if close to normal range, stop iron supplement and may repeat HH in 3 month. HH still low, consider checking iron study, if low, change iron supplement to 3x/week and add Vit C to it (for optimal GI absorption and better tolerance).     PUD (1st part of duodenum):   Medium-size hiatal hernia  - Oct 2018: hospitalized with acute blood loss anemia , required multiple units of PRBCs, s/p EGD which revealed PUD 2/2 drugs (ASA and plavix). Started on pantoprazole 40 mg.   - consider very slow GDR.       Pulmonary Nodules, multiple:  Thyroid Nodules  - saw Oncologist Sept 2018, recommended repeating CT lung in 9 months ( June 2019)  -consider checking with the patient regarding further work up vs comfort care. We recommend comfort care approach given limited life expectancy.       Order: - See above, otherwise,  continue the rest of the current POC.       Electronically signed by:  Ramírez Ibrahim MD     Video-Visit Details  Type of service:  Video Visit  Video End Time (time video stopped): 10:31  Distant Location (provider location):  [facility]

## 2020-05-08 NOTE — LETTER
"    5/8/2020        RE: Diallo Villarreal  [address]  [address]  [place] [address]        [place] [facility]   Diallo Villarreal is being evaluated via a billable video visit due to the restrictions of the Covid-19 pandemic.   The patient has been notified of following:  \"This video visit will be conducted via a call between you and your provider. We have found that certain health care needs can be provided without the need for an in-person physical exam.  This service lets us provide the care you need with a video conversation. If during the course of the call the provider feels a video visit is not appropriate, you will not be charged for this service.\"   The provider has received verbal consent for a Video Visit from the patient or first contact? Yes  Patient or facility staff would like the video invitation sent by: N/A   Video Start Time: 10:29   Chief Complaint   Patient presents with     Video Visit     Regulatory     HPI:    Diallo Villarreal is a 86 year old  (8/31/1932), who is being seen today for a federally mandated E/M visit.  HPI information obtained from: facility staff, patient report and [facility] chart review    Today's concerns are:  -  Mr. Villarreal seen in the presence of Nurse manager. Resident reports doing fine and has no concern today. Denies muscle spasm, chest pain, SOB.   - RN reports continued to have SPC and no concern. Resident denies suprapubic pain, fever or chills.      ---------------------------------  - - Past Medical, social, family histories, medications, and allergies reviewed and updated  - Medications reviewed: in the chart and EHR.   - Case Management:   I have reviewed the care plan and MDS and do agree with the plan. Patient's desire to return to the community is not present.  Information reviewed:  Medications, vital signs, orders, and nursing notes.    MEDICATIONS:  Current Outpatient Medications   Medication Sig Dispense Refill     acetaminophen " (TYLENOL) 500 MG tablet Take 2 tablets (1,000 mg) by mouth 3 times daily as needed for mild pain       aspirin 81 MG EC tablet Take 81 mg by mouth daily        atorvastatin (LIPITOR) 40 MG tablet Take 40 mg by mouth daily       cetirizine (ZYRTEC) 10 MG tablet Take 5 mg by mouth daily as needed        cinacalcet (SENSIPAR) 30 MG tablet Take 30 mg by mouth daily       ferrous sulfate (FEROSUL) 325 (65 Fe) MG tablet Take 325 mg by mouth daily (with breakfast)       furosemide (LASIX) 20 MG tablet Take 10 mg by mouth daily        lidocaine (LMX4) 4 % external cream Apply topically 2 times daily Also BID PRN       metoprolol tartrate (LOPRESSOR) 50 MG tablet Take 50 mg by mouth 2 times daily       mometasone (ELOCON) 0.1 % external ointment Apply topically 2 times daily       nitroGLYcerin (NITROSTAT) 0.4 MG sublingual tablet Place 0.4 mg under the tongue every 5 minutes as needed for chest pain For chest pain place 1 tablet under the tongue every 5 minutes for 3 doses. If symptoms persist 5 minutes after 1st dose call 911.       pantoprazole (PROTONIX) 40 MG EC tablet Take 40 mg by mouth daily       vitamin D3 (CHOLECALCIFEROL) 1000 units (25 mcg) tablet Take 2,000 Units by mouth daily       ROS: 4 point ROS including Respiratory, CV, GI and , other than that noted in the HPI,  is negative    Exam:  Vitals: /74   Pulse 78   Temp 98  F (36.7  C)   Resp 18   Wt 91.6 kg (202 lb)   SpO2 94%   BMI 25.94 kg/m    BMI= Body mass index is 25.94 kg/m .   Limited visit exam done given COVID-19 precautions.   GENERAL APPEARANCE:  in no distress, cooperative  RESP: unlabored breathing.   M/S:   Kyphosis,   SKIN:  No rash.  SPC in place.   NEURO:   no NFD appreciated on observation.   PSYCH:  Fair insight, judgement and memory, affect and mood normal     Lab/Diagnostic data: Reviewed with patient in office  =========================================================  ASSESSMENT/PLAN    Spastic hemiplegic cerebral palsy  (H)  Hx of Concussion without loss of consciousness  Frailty  - chronic right sided weakness,   - Significant  Deficits requiring NH placement. Requiring extensive assistance from nursing. Up for meals only o/w spends the day resting in bed  - continue supportive care.        Coronary atherosclerosis due to lipid rich plaque with stenting to LAD/RCA  Essential HTN  Hx of left carotid endarterectomy (2002)  AAA w/o rupture: 6.7 cm, not amenable to surgery  - at baseline. Continue to follow on Cardio/VS's recommendations.   - lipid profile (March 2020): LDL 40, Cholesterol 98 and TGs 117.  On lipitor 40 mg (high intensity, recommended in PAD).   - on ASA 81 mg, metoprolol and lasix.      Obstruction, uropathy  Chronic Indwelling Catheter:  Recurrent UTI  Supra pubic catheter  - on Cardura and flomax.         Cognitive impairment  - SLUM 10/15.  - Continue to anticipate needs. Chronic condition, ongoing decline expected.   -  Continue to provide redirection and reassurance as needed. Maintain safe living situation with goals focused on comfort.    Chronic normocytic anemia( MARY vs ACD):  - has  Been on ferrous sulfate 325 mg daily since admission to this facility. No iron study in the epic. Last Hb 9.6 (Nov 2019).   - consider repeating HH if close to normal range, stop iron supplement and may repeat HH in 3 month. HH still low, consider checking iron study, if low, change iron supplement to 3x/week and add Vit C to it (for optimal GI absorption and better tolerance).     PUD (1st part of duodenum):   Medium-size hiatal hernia  - Oct 2018: hospitalized with acute blood loss anemia , required multiple units of PRBCs, s/p EGD which revealed PUD 2/2 drugs (ASA and plavix). Started on pantoprazole 40 mg.   - consider very slow GDR.       Pulmonary Nodules, multiple:  Thyroid Nodules  - saw Oncologist Sept 2018, recommended repeating CT lung in 9 months ( June 2019)  -consider checking with the patient regarding further  work up vs comfort care. We recommend comfort care approach given limited life expectancy.       Order: - See above, otherwise, continue the rest of the current POC.       Electronically signed by:  Ramírez Ibrahim MD     Video-Visit Details  Type of service:  Video Visit  Video End Time (time video stopped): 10:31  Distant Location (provider location):  [place] [facility]             Sincerely,        Ramírez Ibrahim MD

## 2020-07-08 NOTE — PROGRESS NOTES
"[place] [facility] Reg[facility]   Diallo Villarreal is being evaluated via a billable video visit due to facility request that providers do not come into the building at this time.   The patient has been notified of following:  \"This video visit will be conducted via a call between you and your provider. We have found that certain health care needs can be provided without the need for an in-person physical exam.  This service lets us provide the care you need with a video conversation. If during the course of the call the provider feels a video visit is not appropriate, you will not be charged for this service.\"   The provider has received verbal consent for a Video Visit from the patient or first contact? Yes  Patient or facility staff would like the video invitation sent by: N/A   Video Start Time: 1:07  [employer] Medical Record Number:  [id number]  Place of Location at the time of visit: [facility]   Chief Complaint   Patient presents with     [facility] Regulatory     HPI:  Diallo Villarreal  is a 87 year old (8/31/1932), who is being seen today for a Regulatory visit.  HPI information obtained from: facility chart records, facility staff, patient report and [facility] chart review. Today's concern is:     Chronic obstructive pulmonary disease, unspecified COPD type (H)  CKD (chronic kidney disease) stage 3, GFR 30-59 ml/min (H)  Hypercalcemia  Spastic hemiplegic cerebral palsy (H)  Suprapubic catheter (H)  Obstruction, uropathy  History of recurrent UTIs  Coronary atherosclerosis due to lipid rich plaque  Essential hypertension  S/P carotid endarterectomy  Abdominal aortic aneurysm (AAA) without rupture (H)  Normocytic anemia  Dementia without behavioral disturbance, unspecified dementia type (H)  Frail elderly     Patient is an 87 year old gentleman with PMH including CP, celiac disease, HTN, urinary retention with recurrent UTIs s/p SPC placement now, CAD, s/p PCI (2018), AAA, CKD3, " hypercalcemia, Dementia, normocytic anemia, recurrent atopic dermatitis, COPD who has resided at [facility] since 3/2019 where he moved from his group home d/t increased care needs.      Recent Med Changes:  - 1/10/20: Clindamycin 300 mg po TID x 5 days.  Dx: boil w/hx MRSA infection     Patient is met today for this video visit with nursing graciously assisting.  He is met in his SNF LTC room where he denies any pain, SOB, CP, HA, lightheadedness, heartburn, upset stomach.  He reports some constipation.       Past Medical and Surgical History reviewed in Epic today.  MEDICATIONS:    Current Outpatient Medications   Medication Sig Dispense Refill     acetaminophen (TYLENOL) 500 MG tablet Take 2 tablets (1,000 mg) by mouth 3 times daily as needed for mild pain       aspirin 81 MG EC tablet Take 81 mg by mouth daily        atorvastatin (LIPITOR) 40 MG tablet Take 0.5 tablets (20 mg) by mouth daily       cetirizine (ZYRTEC) 10 MG tablet Take 5 mg by mouth daily as needed        cinacalcet (SENSIPAR) 30 MG tablet Take 30 mg by mouth daily       ferrous sulfate (FEROSUL) 325 (65 Fe) MG tablet Take 325 mg by mouth daily (with breakfast)       furosemide (LASIX) 20 MG tablet Take 10 mg by mouth daily        lidocaine (LMX4) 4 % external cream Apply topically 2 times daily Also BID PRN       metoprolol tartrate (LOPRESSOR) 50 MG tablet Take 50 mg by mouth 2 times daily       mometasone (ELOCON) 0.1 % external ointment Apply topically 2 times daily       nitroGLYcerin (NITROSTAT) 0.4 MG sublingual tablet Place 0.4 mg under the tongue every 5 minutes as needed for chest pain For chest pain place 1 tablet under the tongue every 5 minutes for 3 doses. If symptoms persist 5 minutes after 1st dose call 911.       pantoprazole (PROTONIX) 40 MG EC tablet Take 40 mg by mouth daily       vitamin D3 (CHOLECALCIFEROL) 1000 units (25 mcg) tablet Take 2,000 Units by mouth daily       REVIEW OF SYSTEMS: Limited secondary to  "cognitive impairment but today pt reports 10 point ROS of systems including Constitutional, Eyes, Respiratory, Cardiovascular, Gastroenterology, Genitourinary, Integumentary, Musculoskeletal, Psychiatric were all negative except for pertinent positives noted in my HPI.  Objective: BP (!) 150/80   Pulse 73   Temp 98.3  F (36.8  C)   Resp 18   Ht 1.88 m (6' 2\")   Wt 92.4 kg (203 lb 12.8 oz)   SpO2 94%   BMI 26.17 kg/m    Limited visit exam done given COVID-19 precautions.   GENERAL APPEARANCE:  Alert, in no distress  RESP:  respiratory effort normal, no respiratory distress, on RA  CV:  LUIS MANUEL for LE peripheral edema  ABDOMEN:  nondistended  M/S:   Gait and station with w/c for mobility, Digits and nails with arthritic changes, reduced muscle mass  SKIN:  Inspection and Palpation of skin and subcutaneous tissue pale, no rashes appreciated today but limited assessment today  PSYCH:  insight and judgement, memory with impairment, affect and mood normal, follows commands readily       Labs:   CBC RESULTS:   Recent Labs   Lab Test 11/15/19  0720 10/16/19  0832   WBC 6.4 7.3   RBC 3.48* 3.48*   HGB 9.6* 9.8*   HCT 30.2* 30.1*   MCV 87 87   MCH 27.6 28.2   MCHC 31.8 32.6   RDW 15.9* 17.5*    198       Last Basic Metabolic Panel:  Recent Labs   Lab Test 11/18/19  0803 10/01/19  1340    145*   POTASSIUM 4.1 4.3   CHLORIDE 106 111*   MARIN 8.9 9.4   CO2 28 25   BUN 15 21   CR 0.94 1.38*   GLC 76 83       Liver Function Studies - No results for input(s): PROTTOTAL, ALBUMIN, BILITOTAL, ALKPHOS, AST, ALT, BILIDIRECT in the last 63457 hours.    TSH   Date Value Ref Range Status   03/15/2019 1.34 0.40 - 4.00 mU/L Final   ]    No results found for: A1C      ASSESSMENT/PLAN:  Chronic obstructive pulmonary disease, unspecified COPD type (H)  Has Albuterol nebs PRN - no usage  Sats 92-96% on RA  Afebrile  Patient reports no SOB    PLAN:  - discontinue Nebs during time of pandemic to reduce risk of proximity " exposure/aerosolization    - Can monitor for need for Albuterol Inhaler PRN or Combivent PRN   - monitor respiratory status    CKD (chronic kidney disease) stage 3, GFR 30-59 ml/min (H)  Hypercalcemia  Last few BMPs:   7/22/19: BUN 32, Creat 1.00, GFR 67  10/1/19: BUN 21, Creat 1.38, GFR 46  11/18/19: BUN 15, Creat 0.94, GFR 72    On Sensipar 30 mg daily - last Ca level 8.9  On Vitamin D 2000 units daily - no recent level    Is on Lasix.   Is not on NSAIDs, ACEI.    PLAN:  - Will avoid nephrotoxic agents (such as ACEI/ARB/NSAIDs, IV contrast) and mindful prescribing with renal dosing.   - continue sensipar  - Continue Vit D, order level check for stability    Spastic hemiplegic cerebral palsy (H)  CP since birth, patient reports right sided paralysis upon birth, hs worked with it and now it is still weak but more functional.    Previously lived in a group home but moved to SNF LTC after TCU stay for increased care needs    A x 1-2 for transfers and ambulation with walker, uses w/c for mobility otherwise    PLAN:  - nursing for increased care needs     Suprapubic catheter (H)  Obstruction, uropathy  History of recurrent UTIs  Patient has history of obstructive uropathy with urinary retention, had indwelling catheter and now has transitioned to SPC (7/25/19) d/t frequent UTIs and discomfort.  SPC placement by Dr Washington, [facility]  Has had several issues with cellulitis at SPC site, treated with antibiotics.  Has been afebrile     Patient has no concerns or issues to report.     PLAN:  - continue routine cares     Coronary atherosclerosis due to lipid rich plaque  Essential hypertension  S/P carotid endarterectomy  Abdominal aortic aneurysm (AAA) without rupture (H)  Per epic note/HX:  F/b [facility], Dr Satinder Bowman - cardiology, Dr Enrique - Vascular  Per Care Everywhere notes:  1. Coronary artery disease found on abnormal stress test.    9/7/2018 status post  atherectomy angioplasty and stenting of the mid LAD.    9/17/2018 angioplasty and stenting of the proximal, mid and distal right coronary artery.   2. AAA measuring 6.4 cm, not amenable to endovascular therapy, follows with Dr. Reyes Enrique, Vascular Surgery.  3. Carotid artery disease status post left CEA in 2002.  4. Hypertension.  5. Hyperlipidemia.      3/2/19 CT Abdomen/Pel noting 6.7 cm AAA  Vascular notes indicate need for open repair of AAA so patient was then sent to Cardiology for surgery clearance.  Stress test was abnormal and was f/b angiogram with stenting in mid LAD followed by staged procedure of RCA at a later date. Ultimately patient elected not to have open repair and follow with surveillance U/S.     Last Cardiology visit - 11/21/19 who recommended annual carotid ultrasound (unclear but seemingly this was not done).       Is on ASA 81 gm daily indefinitely  He is also on atorvastatin 40 mg q HS, Lasix 10 mg, Toprol  mg daily, nitroglycerin prn    BPs 120-150s/70-80s  HRs 70s  Patient denies CP, HA, lightheadedness, SOB    Weights stable 201-203 lbs    PLAN:  - Continue statin, Lasix, Toprol, nitroglycerin PRN  - BP goals are <150/90 mm Hg.This is higher than ACC and AHA recommendations due to risk for hypotension, risk of dizziness and falls, risk of tissue/cerebral hypoperfusion and frailty. Patient is stable with current plan of care and routine assessment.      Dementia without behavioral disturbance, unspecified dementia type (H)  Frail elderly  SLUMS 10/15  Able to make his needs known  Requires increased care needs so resides in SNF with nursing for supportive cares.     Nursing has no concerns    PLAN:  - nursing for supportive cares     Orders written by provider at facility  1. DC Albuterol Nebs  2. 7/13/20 Labs: BMP, Vit D level, Hgb Dx: anemia, CKD3, vit D deficiency    Electronically signed by:  ROCÍO Larsen CNP   Video-Visit Details  Type of service:  Video  Visit  Video End Time (time video stopped): 1:09  Distant Location (provider location):  [facility]

## 2020-07-09 PROBLEM — J44.9 CHRONIC OBSTRUCTIVE PULMONARY DISEASE, UNSPECIFIED COPD TYPE (H): Status: ACTIVE | Noted: 2020-01-01

## 2020-07-09 PROBLEM — N18.30 CKD (CHRONIC KIDNEY DISEASE) STAGE 3, GFR 30-59 ML/MIN (H): Status: ACTIVE | Noted: 2020-01-01

## 2020-07-09 PROBLEM — L89.153 DECUBITUS ULCER OF COCCYX, STAGE III (H): Status: RESOLVED | Noted: 2019-03-17 | Resolved: 2020-01-01

## 2020-07-09 NOTE — LETTER
"    7/9/2020        RE: Diallo Villarreal  [facility]  [address] [facility] [address]        [place] [facility] Regulatory   Diallo Villarreal is being evaluated via a billable video visit due to facility request that providers do not come into the building at this time.   The patient has been notified of following:  \"This video visit will be conducted via a call between you and your provider. We have found that certain health care needs can be provided without the need for an in-person physical exam.  This service lets us provide the care you need with a video conversation. If during the course of the call the provider feels a video visit is not appropriate, you will not be charged for this service.\"   The provider has received verbal consent for a Video Visit from the patient or first contact? Yes  Patient or facility staff would like the video invitation sent by: N/A   Video Start Time: 1:07  [place] Medical Record Number:  [id number]  Place of Location at the time of visit: [facility]   Chief Complaint   Patient presents with     shelter Regulatory     HPI:  Diallo Villarreal  is a 87 year old (8/31/1932), who is being seen today for a Regulatory visit.  HPI information obtained from: facility chart records, facility staff, patient report and [facility] chart review. Today's concern is:     Chronic obstructive pulmonary disease, unspecified COPD type (H)  CKD (chronic kidney disease) stage 3, GFR 30-59 ml/min (H)  Hypercalcemia  Spastic hemiplegic cerebral palsy (H)  Suprapubic catheter (H)  Obstruction, uropathy  History of recurrent UTIs  Coronary atherosclerosis due to lipid rich plaque  Essential hypertension  S/P carotid endarterectomy  Abdominal aortic aneurysm (AAA) without rupture (H)  Normocytic anemia  Dementia without behavioral disturbance, unspecified dementia type (H)  Frail elderly     Patient is an 87 year old gentleman with PMH including CP, celiac disease, " HTN, urinary retention with recurrent UTIs s/p SPC placement now, CAD, s/p PCI (2018), AAA, CKD3, hypercalcemia, Dementia, normocytic anemia, recurrent atopic dermatitis, COPD who has resided at [facility] since 3/2019 where he moved from his group home d/t increased care needs.      Recent Med Changes:  - 1/10/20: Clindamycin 300 mg po TID x 5 days.  Dx: boil w/hx MRSA infection     Patient is met today for this video visit with nursing graciously assisting.  He is met in his SNF LTC room where he denies any pain, SOB, CP, HA, lightheadedness, heartburn, upset stomach.  He reports some constipation.       Past Medical and Surgical History reviewed in Epic today.  MEDICATIONS:    Current Outpatient Medications   Medication Sig Dispense Refill     acetaminophen (TYLENOL) 500 MG tablet Take 2 tablets (1,000 mg) by mouth 3 times daily as needed for mild pain       aspirin 81 MG EC tablet Take 81 mg by mouth daily        atorvastatin (LIPITOR) 40 MG tablet Take 0.5 tablets (20 mg) by mouth daily       cetirizine (ZYRTEC) 10 MG tablet Take 5 mg by mouth daily as needed        cinacalcet (SENSIPAR) 30 MG tablet Take 30 mg by mouth daily       ferrous sulfate (FEROSUL) 325 (65 Fe) MG tablet Take 325 mg by mouth daily (with breakfast)       furosemide (LASIX) 20 MG tablet Take 10 mg by mouth daily        lidocaine (LMX4) 4 % external cream Apply topically 2 times daily Also BID PRN       metoprolol tartrate (LOPRESSOR) 50 MG tablet Take 50 mg by mouth 2 times daily       mometasone (ELOCON) 0.1 % external ointment Apply topically 2 times daily       nitroGLYcerin (NITROSTAT) 0.4 MG sublingual tablet Place 0.4 mg under the tongue every 5 minutes as needed for chest pain For chest pain place 1 tablet under the tongue every 5 minutes for 3 doses. If symptoms persist 5 minutes after 1st dose call 911.       pantoprazole (PROTONIX) 40 MG EC tablet Take 40 mg by mouth daily       vitamin D3 (CHOLECALCIFEROL) 1000  "units (25 mcg) tablet Take 2,000 Units by mouth daily       REVIEW OF SYSTEMS: Limited secondary to cognitive impairment but today pt reports 10 point ROS of systems including Constitutional, Eyes, Respiratory, Cardiovascular, Gastroenterology, Genitourinary, Integumentary, Musculoskeletal, Psychiatric were all negative except for pertinent positives noted in my HPI.  Objective: BP (!) 150/80   Pulse 73   Temp 98.3  F (36.8  C)   Resp 18   Ht 1.88 m (6' 2\")   Wt 92.4 kg (203 lb 12.8 oz)   SpO2 94%   BMI 26.17 kg/m    Limited visit exam done given COVID-19 precautions.   GENERAL APPEARANCE:  Alert, in no distress  RESP:  respiratory effort normal, no respiratory distress, on RA  CV:  LUIS MANUEL for LE peripheral edema  ABDOMEN:  nondistended  M/S:   Gait and station with w/c for mobility, Digits and nails with arthritic changes, reduced muscle mass  SKIN:  Inspection and Palpation of skin and subcutaneous tissue pale, no rashes appreciated today but limited assessment today  PSYCH:  insight and judgement, memory with impairment, affect and mood normal, follows commands readily       Labs:   CBC RESULTS:   Recent Labs   Lab Test 11/15/19  0720 10/16/19  0832   WBC 6.4 7.3   RBC 3.48* 3.48*   HGB 9.6* 9.8*   HCT 30.2* 30.1*   MCV 87 87   MCH 27.6 28.2   MCHC 31.8 32.6   RDW 15.9* 17.5*    198       Last Basic Metabolic Panel:  Recent Labs   Lab Test 11/18/19  0803 10/01/19  1340    145*   POTASSIUM 4.1 4.3   CHLORIDE 106 111*   MARIN 8.9 9.4   CO2 28 25   BUN 15 21   CR 0.94 1.38*   GLC 76 83       Liver Function Studies - No results for input(s): PROTTOTAL, ALBUMIN, BILITOTAL, ALKPHOS, AST, ALT, BILIDIRECT in the last 96385 hours.    TSH   Date Value Ref Range Status   03/15/2019 1.34 0.40 - 4.00 mU/L Final   ]    No results found for: A1C      ASSESSMENT/PLAN:  Chronic obstructive pulmonary disease, unspecified COPD type (H)  Has Albuterol nebs PRN - no usage  Sats 92-96% on RA  Afebrile  Patient reports " no SOB    PLAN:  - discontinue Nebs during time of pandemic to reduce risk of proximity exposure/aerosolization    - Can monitor for need for Albuterol Inhaler PRN or Combivent PRN   - monitor respiratory status    CKD (chronic kidney disease) stage 3, GFR 30-59 ml/min (H)  Hypercalcemia  Last few BMPs:   7/22/19: BUN 32, Creat 1.00, GFR 67  10/1/19: BUN 21, Creat 1.38, GFR 46  11/18/19: BUN 15, Creat 0.94, GFR 72    On Sensipar 30 mg daily - last Ca level 8.9  On Vitamin D 2000 units daily - no recent level    Is on Lasix.   Is not on NSAIDs, ACEI.    PLAN:  - Will avoid nephrotoxic agents (such as ACEI/ARB/NSAIDs, IV contrast) and mindful prescribing with renal dosing.   - continue sensipar  - Continue Vit D, order level check for stability    Spastic hemiplegic cerebral palsy (H)  CP since birth, patient reports right sided paralysis upon birth, hs worked with it and now it is still weak but more functional.    Previously lived in a group home but moved to SNF LTC after TCU stay for increased care needs    A x 1-2 for transfers and ambulation with walker, uses w/c for mobility otherwise    PLAN:  - nursing for increased care needs     Suprapubic catheter (H)  Obstruction, uropathy  History of recurrent UTIs  Patient has history of obstructive uropathy with urinary retention, had indwelling catheter and now has transitioned to SPC (7/25/19) d/t frequent UTIs and discomfort.  SPC placement by Dr Washington, [facility] at [facility]  Has had several issues with cellulitis at SPC site, treated with antibiotics.  Has been afebrile     Patient has no concerns or issues to report.     PLAN:  - continue routine cares     Coronary atherosclerosis due to lipid rich plaque  Essential hypertension  S/P carotid endarterectomy  Abdominal aortic aneurysm (AAA) without rupture (H)  Per epic note/HX:  F/b [facility] - [facility], Dr Satinder Bowman - cardiology, Dr Enrique - Vascular  Per Care Everywhere notes:  1.  Coronary artery disease found on abnormal stress test.    9/7/2018 status post atherectomy angioplasty and stenting of the mid LAD.    9/17/2018 angioplasty and stenting of the proximal, mid and distal right coronary artery.   2. AAA measuring 6.4 cm, not amenable to endovascular therapy, follows with Dr. Reyes Enrique, Vascular Surgery.  3. Carotid artery disease status post left CEA in 2002.  4. Hypertension.  5. Hyperlipidemia.      3/2/19 CT Abdomen/Pel noting 6.7 cm AAA  Vascular notes indicate need for open repair of AAA so patient was then sent to Cardiology for surgery clearance.  Stress test was abnormal and was f/b angiogram with stenting in mid LAD followed by staged procedure of RCA at a later date. Ultimately patient elected not to have open repair and follow with surveillance U/S.     Last Cardiology visit - 11/21/19 who recommended annual carotid ultrasound (unclear but seemingly this was not done).       Is on ASA 81 gm daily indefinitely  He is also on atorvastatin 40 mg q HS, Lasix 10 mg, Toprol  mg daily, nitroglycerin prn    BPs 120-150s/70-80s  HRs 70s  Patient denies CP, HA, lightheadedness, SOB    Weights stable 201-203 lbs    PLAN:  - Continue statin, Lasix, Toprol, nitroglycerin PRN  - BP goals are <150/90 mm Hg.This is higher than ACC and AHA recommendations due to risk for hypotension, risk of dizziness and falls, risk of tissue/cerebral hypoperfusion and frailty. Patient is stable with current plan of care and routine assessment.      Dementia without behavioral disturbance, unspecified dementia type (H)  Frail elderly  SLUMS 10/15  Able to make his needs known  Requires increased care needs so resides in SNF with nursing for supportive cares.     Nursing has no concerns    PLAN:  - nursing for supportive cares     Orders written by provider at facility  1. BELKYS Albuterol Nebs  2. 7/13/20 Labs: BMP, Vit D level, Hgb Dx: anemia, CKD3, vit D deficiency    Electronically signed by:  Jennifer POST  ROCÍO Bryant CNP   Video-Visit Details  Type of service:  Video Visit  Video End Time (time video stopped): 1:09  Distant Location (provider location):  [place] [facility]             Sincerely,        ROCÍO Larsen CNP

## 2020-07-22 NOTE — LETTER
"    7/22/2020        RE: Diallo Villarreal  [facility]  [address] [facility] [address]        [place] [facility]   Diallo Villarreal is being evaluated via a billable video visit due to the restrictions of the Covid-19 pandemic.   The patient has been notified of following:  \"This video visit will be conducted via a call between you and your provider. We have found that certain health care needs can be provided without the need for an in-person physical exam.  This service lets us provide the care you need with a video conversation. If during the course of the call the provider feels a video visit is not appropriate, you will not be charged for this service.\"   The provider has received verbal consent for a Video Visit from the patient or first contact? Yes  Patient  or facility staff would like the video invitation sent by: N/A   Video Start Time: 1329  [place] Medical Record Number:  [id number]  Place of Location at the time of visit: [facility]   Chief Complaint   Patient presents with     Nursing Home Acute     HPI:  Diallo Villarreal  is a 87 year old (8/31/1932), who is being seen today for a visit.  HPI information obtained from: facility chart records, facility staff, patient report and [facility] chart review. Today's concern is:     Boil  Hx MRSA infection  Cognitive impairment  Suprapubic catheter (H)  Spastic hemiplegic cerebral palsy (H)  Slow transit constipation     Patient is an 87 year old gentleman with PMH including CP, celiac disease, HTN, urinary retention with recurrent UTIs s/p SPC placement now, CAD, s/p PCI (2018), AAA, CKD3, hypercalcemia, Dementia, normocytic anemia, recurrent atopic dermatitis, COPD who has resided at [facility] since 3/2019 where he moved from his group home d/t increased care needs.       Recent Med Changes:  - 1/10/20: Clindamycin 300 mg po TID x 5 days.  Dx: boil w/hx MRSA infection   - 7/9/20: discontinue Albuterol " Nebs    Nursing updating that patient has another boil on abdomen by his SPC that is about 4-5 cm in duration and painful for patient. Nursing reporting purulent drainage.     Patient is met today in his LTC SNF room with nursing graciously assisting for this video visit d/t COVID pandemic.    Patient reports he has increase in pain at the boil site and while he endorses drainage, he cannot comment on characteristics of the boil.    He denies any SOB, CP, trouble with his SPC, lightheadedness.  He endorses an occasional upset stomach and some constipation.      Patient does have a long history of rashes and skin disturbances.      Past Medical and Surgical History reviewed in Epic today.  MEDICATIONS:    Current Outpatient Medications   Medication Sig Dispense Refill     acetaminophen (TYLENOL) 500 MG tablet Take 2 tablets (1,000 mg) by mouth 3 times daily as needed for mild pain       aspirin 81 MG EC tablet Take 81 mg by mouth daily        atorvastatin (LIPITOR) 40 MG tablet Take 0.5 tablets (20 mg) by mouth daily       cetirizine (ZYRTEC) 10 MG tablet Take 5 mg by mouth daily as needed        cinacalcet (SENSIPAR) 30 MG tablet Take 30 mg by mouth daily       ferrous sulfate (FEROSUL) 325 (65 Fe) MG tablet Take 325 mg by mouth daily (with breakfast)       furosemide (LASIX) 20 MG tablet Take 10 mg by mouth daily        lidocaine (LMX4) 4 % external cream Apply topically 2 times daily Also BID PRN       metoprolol tartrate (LOPRESSOR) 50 MG tablet Take 50 mg by mouth 2 times daily       mometasone (ELOCON) 0.1 % external ointment Apply topically 2 times daily       nitroGLYcerin (NITROSTAT) 0.4 MG sublingual tablet Place 0.4 mg under the tongue every 5 minutes as needed for chest pain For chest pain place 1 tablet under the tongue every 5 minutes for 3 doses. If symptoms persist 5 minutes after 1st dose call 911.       pantoprazole (PROTONIX) 40 MG EC tablet Take 40 mg by mouth daily       vitamin D3  "(CHOLECALCIFEROL) 1000 units (25 mcg) tablet Take 2,000 Units by mouth daily       REVIEW OF SYSTEMS: Limited secondary to cognitive impairment but today pt reports 10 point ROS of systems including Constitutional, Eyes, Respiratory, Cardiovascular, Gastroenterology, Genitourinary, Integumentary, Musculoskeletal, Psychiatric were all negative except for pertinent positives noted in my HPI.  Objective: BP (!) 146/71   Pulse 71   Temp 96.2  F (35.7  C)   Resp 16   Ht 1.88 m (6' 2\")   Wt 91.8 kg (202 lb 6.4 oz)   SpO2 91%   BMI 25.99 kg/m    Limited visit exam done given COVID-19 precautions.   GENERAL APPEARANCE:  Alert, in no distress, pleasantly confused  RESP:  respiratory effort normal, no respiratory distress, on RA  CV:  LUIS MANUEL for LE peripheral edema  ABDOMEN:  nondistended  M/S:   Gait and station with w/c for mobility, Digits and nails with spasticity and arthritic changes, reduced muscle mass  SKIN:  Inspection and Palpation of skin and subcutaneous tissue without visualization of boil today d/t constrictive clothing at time of this visit.    PSYCH:  insight and judgement, memory with impairment, affect and mood normal, follows commands readily but forgets quickly         Labs:   CBC RESULTS:   Recent Labs   Lab Test 11/15/19  0720 10/16/19  0832   WBC 6.4 7.3   RBC 3.48* 3.48*   HGB 9.6* 9.8*   HCT 30.2* 30.1*   MCV 87 87   MCH 27.6 28.2   MCHC 31.8 32.6   RDW 15.9* 17.5*    198       Last Basic Metabolic Panel:  Recent Labs   Lab Test 07/13/20  0713 11/18/19  0803    141   POTASSIUM 3.8 4.1   CHLORIDE 102 106   MARIN 8.3* 8.9   CO2 30 28   BUN 10 15   CR 0.92 0.94   GLC 76 76       TSH   Date Value Ref Range Status   03/15/2019 1.34 0.40 - 4.00 mU/L Final   ]    Lab Results   Component Value Date    A1C 5.5 07/13/2020         ASSESSMENT/PLAN:     Boil  Hx MRSA infection  Cognitive impairment  Suprapubic catheter (H)  Spastic hemiplegic cerebral palsy (H)  Slow transit constipation "     Patient has 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  Last treated January 2020 for similar boil.  Can repeat treatment.    Cognitive impairment present and patient still talking about walking again despite not having worked with therapy in quite some time d/t plateau.      Orders written by provider at facility  1. Clindamycin 300 mg po TID x 5 days.  Dx: boil w/hx MRSA infection   Update NP in 5 days if not resolved/significantly improving.     Electronically signed by:  ROCÍO Larsen CNP   Video-Visit Details  Type of service:  Video Visit  Video End Time (time video stopped): 1335  Distant Location (provider location):  [place] [facility]             Sincerely,        ROCÍO Larsen CNP

## 2020-07-22 NOTE — PROGRESS NOTES
"[place] [facility]   Diallo Villarreal is being evaluated via a billable video visit due to the restrictions of the Covid-19 pandemic.   The patient has been notified of following:  \"This video visit will be conducted via a call between you and your provider. We have found that certain health care needs can be provided without the need for an in-person physical exam.  This service lets us provide the care you need with a video conversation. If during the course of the call the provider feels a video visit is not appropriate, you will not be charged for this service.\"   The provider has received verbal consent for a Video Visit from the patient or first contact? Yes  Patient  or facility staff would like the video invitation sent by: N/A   Video Start Time: 1329  [place] Medical Record Number:  [id number]  Place of Location at the time of visit: [facility]   Chief Complaint   Patient presents with     Nursing Home Acute     HPI:  Diallo Villarreal  is a 87 year old (8/31/1932), who is being seen today for a visit.  HPI information obtained from: facility chart records, facility staff, patient report and [facility] chart review. Today's concern is:     Boil  Hx MRSA infection  Cognitive impairment  Suprapubic catheter (H)  Spastic hemiplegic cerebral palsy (H)  Slow transit constipation     Patient is an 87 year old gentleman with PMH including CP, celiac disease, HTN, urinary retention with recurrent UTIs s/p SPC placement now, CAD, s/p PCI (2018), AAA, CKD3, hypercalcemia, Dementia, normocytic anemia, recurrent atopic dermatitis, COPD who has resided at [facility] since 3/2019 where he moved from his group home d/t increased care needs.       Recent Med Changes:  - 1/10/20: Clindamycin 300 mg po TID x 5 days.  Dx: boil w/hx MRSA infection   - 7/9/20: discontinue Albuterol Nebs    Nursing updating that patient has another boil on abdomen by his SPC that is about 4-5 cm in duration and " painful for patient. Nursing reporting purulent drainage.     Patient is met today in his LTC SNF room with nursing graciously assisting for this video visit d/t COVID pandemic.    Patient reports he has increase in pain at the boil site and while he endorses drainage, he cannot comment on characteristics of the boil.    He denies any SOB, CP, trouble with his SPC, lightheadedness.  He endorses an occasional upset stomach and some constipation.      Patient does have a long history of rashes and skin disturbances.      Past Medical and Surgical History reviewed in Epic today.  MEDICATIONS:    Current Outpatient Medications   Medication Sig Dispense Refill     acetaminophen (TYLENOL) 500 MG tablet Take 2 tablets (1,000 mg) by mouth 3 times daily as needed for mild pain       aspirin 81 MG EC tablet Take 81 mg by mouth daily        atorvastatin (LIPITOR) 40 MG tablet Take 0.5 tablets (20 mg) by mouth daily       cetirizine (ZYRTEC) 10 MG tablet Take 5 mg by mouth daily as needed        cinacalcet (SENSIPAR) 30 MG tablet Take 30 mg by mouth daily       ferrous sulfate (FEROSUL) 325 (65 Fe) MG tablet Take 325 mg by mouth daily (with breakfast)       furosemide (LASIX) 20 MG tablet Take 10 mg by mouth daily        lidocaine (LMX4) 4 % external cream Apply topically 2 times daily Also BID PRN       metoprolol tartrate (LOPRESSOR) 50 MG tablet Take 50 mg by mouth 2 times daily       mometasone (ELOCON) 0.1 % external ointment Apply topically 2 times daily       nitroGLYcerin (NITROSTAT) 0.4 MG sublingual tablet Place 0.4 mg under the tongue every 5 minutes as needed for chest pain For chest pain place 1 tablet under the tongue every 5 minutes for 3 doses. If symptoms persist 5 minutes after 1st dose call 911.       pantoprazole (PROTONIX) 40 MG EC tablet Take 40 mg by mouth daily       vitamin D3 (CHOLECALCIFEROL) 1000 units (25 mcg) tablet Take 2,000 Units by mouth daily       REVIEW OF SYSTEMS: Limited secondary to  "cognitive impairment but today pt reports 10 point ROS of systems including Constitutional, Eyes, Respiratory, Cardiovascular, Gastroenterology, Genitourinary, Integumentary, Musculoskeletal, Psychiatric were all negative except for pertinent positives noted in my HPI.  Objective: BP (!) 146/71   Pulse 71   Temp 96.2  F (35.7  C)   Resp 16   Ht 1.88 m (6' 2\")   Wt 91.8 kg (202 lb 6.4 oz)   SpO2 91%   BMI 25.99 kg/m    Limited visit exam done given COVID-19 precautions.   GENERAL APPEARANCE:  Alert, in no distress, pleasantly confused  RESP:  respiratory effort normal, no respiratory distress, on RA  CV:  LUIS MANUEL for LE peripheral edema  ABDOMEN:  nondistended  M/S:   Gait and station with w/c for mobility, Digits and nails with spasticity and arthritic changes, reduced muscle mass  SKIN:  Inspection and Palpation of skin and subcutaneous tissue without visualization of boil today d/t constrictive clothing at time of this visit.    PSYCH:  insight and judgement, memory with impairment, affect and mood normal, follows commands readily but forgets quickly         Labs:   CBC RESULTS:   Recent Labs   Lab Test 11/15/19  0720 10/16/19  0832   WBC 6.4 7.3   RBC 3.48* 3.48*   HGB 9.6* 9.8*   HCT 30.2* 30.1*   MCV 87 87   MCH 27.6 28.2   MCHC 31.8 32.6   RDW 15.9* 17.5*    198       Last Basic Metabolic Panel:  Recent Labs   Lab Test 07/13/20  0713 11/18/19  0803    141   POTASSIUM 3.8 4.1   CHLORIDE 102 106   MARIN 8.3* 8.9   CO2 30 28   BUN 10 15   CR 0.92 0.94   GLC 76 76       TSH   Date Value Ref Range Status   03/15/2019 1.34 0.40 - 4.00 mU/L Final   ]    Lab Results   Component Value Date    A1C 5.5 07/13/2020         ASSESSMENT/PLAN:     Boil  Hx MRSA infection  Cognitive impairment  Suprapubic catheter (H)  Spastic hemiplegic cerebral palsy (H)  Slow transit constipation     Patient has 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  Last treated January 2020 for similar boil.  Can repeat " treatment.    Cognitive impairment present and patient still talking about walking again despite not having worked with therapy in quite some time d/t plateau.      Orders written by provider at facility  1. Clindamycin 300 mg po TID x 5 days.  Dx: boil w/hx MRSA infection   Update NP in 5 days if not resolved/significantly improving.     Electronically signed by:  ROCÍO Larsen CNP   Video-Visit Details  Type of service:  Video Visit  Video End Time (time video stopped): 1335  Distant Location (provider location):  [facility]

## 2020-08-07 NOTE — TELEPHONE ENCOUNTER
Prior Authorization Retail Medication Request    Medication/Dose: Sensipar 30 mg every day.  ICD code (if different than what is on RX):  Hypercalcemia  Previously Tried and Failed:  Lasix, calcitonin, pamidronate  Rationale:  This is the only medication that helps the patient.    Insurance Name:  unknown  Insurance ID:  unknown      Pharmacy Information (if different than what is on RX)  Name:  [facility]  Phone:  [phone]

## 2020-08-07 NOTE — TELEPHONE ENCOUNTER
PA Initiation    Medication: Sensipar 30mg  Insurance Company: [employer] [place] - Phone [phone] Fax [phone]  Pharmacy Filling the Rx: [facility] - [place], [place] - [address]  Filling Pharmacy Phone: [phone]  Filling Pharmacy Fax:    Start Date: 8/7/2020

## 2020-08-07 NOTE — TELEPHONE ENCOUNTER
Prior Authorization Approval    Authorization Effective Date: 5/6/2020  Authorization Expiration Date: 8/7/2021  Medication: Sensipar 30mg  Approved Dose/Quantity:   Reference #: [id number]   Insurance Company: [employer] [place] - Phone [phone] Fax [phone]  Expected CoPay:       CoPay Card Available:      Foundation Assistance Needed:    Which Pharmacy is filling the prescription (Not needed for infusion/clinic administered): [facility] - [place], [place] - [address]  Pharmacy Notified: Yes  Patient Notified: Yes, **Instructed pharmacy to notify patient when script is ready to /ship.**

## 2020-09-09 NOTE — PROGRESS NOTES
"[facility]   Diallo Villarreal is being evaluated via a billable video visit due to the restrictions of the Covid-19 pandemic.   The patient has been notified of following:  \"This video visit will be conducted via a call between you and your provider. We have found that certain health care needs can be provided without the need for an in-person physical exam.  This service lets us provide the care you need with a video conversation. If during the course of the call the provider feels a video visit is not appropriate, you will not be charged for this service.\"   The provider has received verbal consent for a Video Visit from the patient or first contact? Yes  Patient or facility staff would like the video invitation sent by: N/A    Video Start Time: 12:19  Which Facility the Patient is at during the time of visit: [facility] [place]  Chief Complaint   Patient presents with     Video Visit     Regulatory     HPI:    Diallo Villarreal is a 86 year old  (8/31/1932), who is being seen today for a federally mandated E/M visit.  HPI information obtained from: facility staff, patient report and [facility] chart review    Today's concerns are:  - Pt seen in the presence of  GNP who graciously assisted with the virtual visit  -  Resident reports doing fine and has no concern today. Denies muscle spasm, chest pain, SOB, or lower abdominal pain.       ---------------------------------  -  Past Medical, social, family histories, medications, and allergies reviewed and updated  - Medications reviewed: in the chart and EHR.   - Case Management:   I have reviewed the care plan and MDS and do agree with the plan. Patient's desire to return to the community is not present.  Information reviewed:  Medications, vital signs, orders, and nursing notes.    MEDICATIONS:  No current outpatient medications on file.     ROS: 4 point ROS including Respiratory, CV, GI and , other than that noted in " "the HPI,  is negative    Exam:  Vitals: BP (!) 142/82   Pulse 60   Temp 97.4  F (36.3  C)   Resp 18   Ht 1.88 m (6' 2\")   Wt 94.3 kg (207 lb 12.8 oz)   SpO2 98%   BMI 26.68 kg/m    BMI= Body mass index is 26.68 kg/m .   Limited visit exam done given COVID-19 precautions.   GENERAL APPEARANCE:  in no distress, cooperative  RESP: unlabored breathing.   M/S:   Kyphosis,   SKIN:  No rash.  SPC in place.   NEURO:   no NFD appreciated on observation.   PSYCH:  Fair insight, judgement and memory, affect and mood normal     Lab/Diagnostic data: Reviewed with patient in office  =========================================================  ASSESSMENT/PLAN    Spastic hemiplegic cerebral palsy (H)  Hx of Concussion without loss of consciousness  Frailty  - chronic right sided weakness,   - Significant  Deficits requiring NH placement. Requiring extensive assistance from nursing. Up for meals only o/w spends the day resting in bed  - continue supportive care.        Coronary atherosclerosis due to lipid rich plaque with stenting to LAD/RCA  Essential HTN  Hx of left carotid endarterectomy (2002)  AAA w/o rupture: 6.7 cm, not amenable to surgery  - at baseline. Continue to follow on Cardio/VS's recommendations.   - lipid profile (March 2020): LDL 40, Cholesterol 98 and TGs 117.  On lipitor 20 mg (reduced from 40 mg in May 2020).    - on ASA 81 mg, metoprolol and lasix.      Obstruction, uropathy  Chronic Indwelling Catheter:  Recurrent UTI  Supra pubic catheter  - stable. At risk for CAUTI. Continue current preventive measures.       Mild Cognitive impairment  - BIMS 13/15  - Continue to anticipate needs. Chronic condition, ongoing decline expected.   -  Continue to provide redirection and reassurance as needed. Maintain safe living situation with goals focused on comfort.    Chronic normocytic anemia( MARY vs ACD):  - has  Been on ferrous sulfate 325 mg daily since admission to this facility. No iron study in the [facility]. Last " Hb 9.6 (Nov 2019).   - consider repeating HH if close to normal range, stop iron supplement and may repeat HH in 3 month. HH still low, consider checking iron study, if low, change iron supplement to 3x/week and add Vit C to it (for optimal GI absorption and better tolerance).     PUD (1st part of duodenum):   Medium-size hiatal hernia  - Oct 2018: hospitalized with acute blood loss anemia , required multiple units of PRBCs, s/p EGD which revealed PUD 2/2 drugs (ASA and plavix). Started on pantoprazole 40 mg.   - consider very slow GDR.       Pulmonary Nodules, multiple:  Thyroid Nodules  - saw Oncologist Sept 2018, recommended repeating CT lung in 9 months ( June 2019)  -consider checking with the patient regarding further work up vs comfort care. We recommend comfort care approach given limited life expectancy.       Order: - See above, otherwise, continue the rest of the current POC.       Electronically signed by:  Ramírez Ibrahim MD     Video-Visit Details  Type of service:  Video Visit  Video End Time (time video stopped): 12:22  Distant Location (provider location):  [facility]

## 2020-09-10 NOTE — LETTER
"    9/10/2020        RE: Diallo Villarreal  [facility]  [address] [place] [id number]        [facility]   Diallo Villarreal is being evaluated via a billable video visit due to the restrictions of the Covid-19 pandemic.   The patient has been notified of following:  \"This video visit will be conducted via a call between you and your provider. We have found that certain health care needs can be provided without the need for an in-person physical exam.  This service lets us provide the care you need with a video conversation. If during the course of the call the provider feels a video visit is not appropriate, you will not be charged for this service.\"   The provider has received verbal consent for a Video Visit from the patient or first contact? Yes  Patient or facility staff would like the video invitation sent by: N/A   Video Start Time: 12:19  Chief Complaint   Patient presents with     Video Visit     Regulatory     HPI:    Diallo Villarreal is a 86 year old  (8/31/1932), who is being seen today for a federally mandated E/M visit.  HPI information obtained from: facility staff, patient report and [facility] chart review    Today's concerns are:  - Pt seen in the presence of  GNP who graciously assisted with the virtual visit  -  Resident reports doing fine and has no concern today. Denies muscle spasm, chest pain, SOB, or lower abdominal pain.       ---------------------------------  -  Past Medical, social, family histories, medications, and allergies reviewed and updated  - Medications reviewed: in the chart and EHR.   - Case Management:   I have reviewed the care plan and MDS and do agree with the plan. Patient's desire to return to the community is not present.  Information reviewed:  Medications, vital signs, orders, and nursing notes.    MEDICATIONS:  No current outpatient medications on file.     ROS: 4 point ROS including Respiratory, CV, GI and , other than " "that noted in the HPI,  is negative    Exam:  Vitals: BP (!) 142/82   Pulse 60   Temp 97.4  F (36.3  C)   Resp 18   Ht 1.88 m (6' 2\")   Wt 94.3 kg (207 lb 12.8 oz)   SpO2 98%   BMI 26.68 kg/m    BMI= Body mass index is 26.68 kg/m .   Limited visit exam done given COVID-19 precautions.   GENERAL APPEARANCE:  in no distress, cooperative  RESP: unlabored breathing.   M/S:   Kyphosis,   SKIN:  No rash.  SPC in place.   NEURO:   no NFD appreciated on observation.   PSYCH:  Fair insight, judgement and memory, affect and mood normal     Lab/Diagnostic data: Reviewed with patient in office  =========================================================  ASSESSMENT/PLAN    Spastic hemiplegic cerebral palsy (H)  Hx of Concussion without loss of consciousness  Frailty  - chronic right sided weakness,   - Significant  Deficits requiring NH placement. Requiring extensive assistance from nursing. Up for meals only o/w spends the day resting in bed  - continue supportive care.        Coronary atherosclerosis due to lipid rich plaque with stenting to LAD/RCA  Essential HTN  Hx of left carotid endarterectomy (2002)  AAA w/o rupture: 6.7 cm, not amenable to surgery  - at baseline. Continue to follow on Cardio/VS's recommendations.   - lipid profile (March 2020): LDL 40, Cholesterol 98 and TGs 117.  On lipitor 20 mg (reduced from 40 mg in May 2020).    - on ASA 81 mg, metoprolol and lasix.      Obstruction, uropathy  Chronic Indwelling Catheter:  Recurrent UTI  Supra pubic catheter  - stable. At risk for CAUTI. Continue current preventive measures.       Mild Cognitive impairment  - BIMS 13/15  - Continue to anticipate needs. Chronic condition, ongoing decline expected.   -  Continue to provide redirection and reassurance as needed. Maintain safe living situation with goals focused on comfort.    Chronic normocytic anemia( MARY vs ACD):  - has  Been on ferrous sulfate 325 mg daily since admission to this facility. No iron study in " the epic. Last Hb 9.6 (Nov 2019).   - consider repeating HH if close to normal range, stop iron supplement and may repeat HH in 3 month. HH still low, consider checking iron study, if low, change iron supplement to 3x/week and add Vit C to it (for optimal GI absorption and better tolerance).     PUD (1st part of duodenum):   Medium-size hiatal hernia  - Oct 2018: hospitalized with acute blood loss anemia , required multiple units of PRBCs, s/p EGD which revealed PUD 2/2 drugs (ASA and plavix). Started on pantoprazole 40 mg.   - consider very slow GDR.       Pulmonary Nodules, multiple:  Thyroid Nodules  - saw Oncologist Sept 2018, recommended repeating CT lung in 9 months ( June 2019)  -consider checking with the patient regarding further work up vs comfort care. We recommend comfort care approach given limited life expectancy.       Order: - See above, otherwise, continue the rest of the current POC.       Electronically signed by:  Ramírez Ibrahim MD     Video-Visit Details  Type of service:  Video Visit  Video End Time (time video stopped): 12:22  Distant Location (provider location):  [facility]             Sincerely,        Ramírez Ibrahim MD

## 2020-09-15 PROBLEM — N39.0 COMPLICATED UTI (URINARY TRACT INFECTION): Status: ACTIVE | Noted: 2020-01-01

## 2020-09-15 PROBLEM — G93.41 METABOLIC ENCEPHALOPATHY: Status: ACTIVE | Noted: 2020-01-01

## 2020-09-15 PROBLEM — N17.9 ACUTE KIDNEY INJURY (H): Status: ACTIVE | Noted: 2020-01-01

## 2020-09-15 PROBLEM — E87.1 HYPONATREMIA: Status: ACTIVE | Noted: 2020-01-01

## 2020-09-15 PROBLEM — A41.9 SEPSIS (H): Status: ACTIVE | Noted: 2020-01-01

## 2020-09-15 PROBLEM — R41.82 ALTERED MENTAL STATUS: Status: ACTIVE | Noted: 2020-01-01

## 2020-09-15 PROBLEM — R56.9 SEIZURE-LIKE ACTIVITY (H): Status: ACTIVE | Noted: 2020-01-01

## 2020-09-15 NOTE — ED NOTES
DATE:  9/15/2020   TIME OF RECEIPT FROM LAB:  1822  LAB TEST:  Lactic  LAB VALUE:  6.3  RESULTS GIVEN WITH READ-BACK TO (PROVIDER):  Primary RN Sol and Provider Danya Nicholas  TIME LAB VALUE REPORTED TO PROVIDER:   1822

## 2020-09-15 NOTE — ED PROVIDER NOTES
History     Chief Complaint   Patient presents with     Altered Mental Status     HPI  Diallo Villarreal is a 88 year old male who presents to the emergency department via EMS for altered mental status. The patient is unable to provide history due to mental status.  EMS reported that based on nursing home staff he had been more lethargic lately so the provider at the nursing home advised checking some labs and starting Rocephin because he has a history of urinary tract infections.  Before they did anything however the patient demonstrated seizure activities so they called EMS who brought him here.  The patient has a history of cerebral palsy with suprapubic catheter in place.        Allergies:  Allergies   Allergen Reactions     Gluten Meal Other (See Comments)     Celiac disease     Wheat Extract Other (See Comments)       Problem List:    Patient Active Problem List    Diagnosis Date Noted     Sepsis (H) 09/15/2020     Priority: Medium     Complicated UTI (urinary tract infection) 09/15/2020     Priority: Medium     Altered mental status 09/15/2020     Priority: Medium     Seizure-like activity (H) 09/15/2020     Priority: Medium     Acute kidney injury (H) 09/15/2020     Priority: Medium     Hyponatremia 09/15/2020     Priority: Medium     Metabolic encephalopathy 09/15/2020     Priority: Medium     Chronic obstructive pulmonary disease, unspecified COPD type (H) 07/09/2020     Priority: Medium     CKD (chronic kidney disease) stage 3, GFR 30-59 ml/min (H) 07/09/2020     Priority: Medium     Candidiasis of skin 03/17/2019     Priority: Medium     Normocytic anemia 03/15/2019     Priority: Medium     Obstruction, uropathy 03/15/2019     Priority: Medium     Retention of urine 03/12/2019     Priority: Medium     Cerebral palsy (H) 03/02/2019     Priority: Medium     Concussion 03/02/2019     Priority: Medium     Fall 03/02/2019     Priority: Medium     Abdominal aortic aneurysm (AAA) without rupture (H) 10/16/2018      Priority: Medium     Coronary atherosclerosis 09/06/2018     Priority: Medium     S/P carotid endarterectomy 11/20/2014     Priority: Medium     Celiac sprue 06/29/2013     Priority: Medium     Cellulitis and abscess of leg 06/29/2013     Priority: Medium     Essential hypertension 06/29/2013     Priority: Medium        Past Medical History:    Past Medical History:   Diagnosis Date     AAA (abdominal aortic aneurysm) (H)      Celiac sprue      HTN (hypertension)        Past Surgical History:    Past Surgical History:   Procedure Laterality Date     LEFT CAROTID ENDARTERECTOMY       TONSILLECTOMY         Family History:    History reviewed. No pertinent family history.    Social History:  Marital Status:  Single [1]  Social History     Tobacco Use     Smoking status: Former Smoker     Smokeless tobacco: Never Used   Substance Use Topics     Alcohol use: No     Frequency: Never     Drug use: None        Medications:    aspirin 81 MG EC tablet  atorvastatin (LIPITOR) 40 MG tablet  cinacalcet (SENSIPAR) 30 MG tablet  ferrous sulfate (FEROSUL) 325 (65 Fe) MG tablet  furosemide (LASIX) 20 MG tablet  metoprolol tartrate (LOPRESSOR) 50 MG tablet  pantoprazole (PROTONIX) 40 MG EC tablet  senna-docusate (SENOKOT-S/PERICOLACE) 8.6-50 MG tablet  vitamin D3 (CHOLECALCIFEROL) 1000 units (25 mcg) tablet  acetaminophen (TYLENOL) 500 MG tablet  cetirizine (ZYRTEC) 10 MG tablet  lidocaine (LMX4) 4 % external cream  mometasone (ELOCON) 0.1 % external ointment  nitroGLYcerin (NITROSTAT) 0.4 MG sublingual tablet          Review of Systems   Unable to perform ROS: Patient unresponsive       Physical Exam   BP: 116/68  Pulse: 82  Temp: 101.4  F (38.6  C)  Resp: 16  SpO2: 99 %      Physical Exam  Vitals signs and nursing note reviewed.   Constitutional:       General: He is not in acute distress.     Appearance: He is well-developed. He is ill-appearing. He is not diaphoretic.      Comments: Patient unresponsive to voice or physical  stimuli.  He is making nonpurposeful movements and moaning/grunting, occasional coarse wet cough.   HENT:      Head: Normocephalic and atraumatic.      Mouth/Throat:      Mouth: Mucous membranes are dry.      Comments: Dentures  Eyes:      Pupils: Pupils are equal, round, and reactive to light.   Neck:      Musculoskeletal: Neck supple.   Cardiovascular:      Rate and Rhythm: Regular rhythm. Tachycardia present.      Heart sounds: Normal heart sounds.   Pulmonary:      Effort: Pulmonary effort is normal. No respiratory distress.      Breath sounds: No stridor. Rales (left base) present. No wheezing.   Abdominal:      General: Abdomen is flat.      Tenderness: There is abdominal tenderness. There is no guarding.      Comments: Suprapubic catheter in place, insertion site clean, dry, intact.  Urine into with sediment, somewhat cloudy in appearance.   Musculoskeletal:         General: No deformity.   Skin:     General: Skin is warm and dry.   Neurological:      Mental Status: He is lethargic.   Psychiatric:      Comments: Patient unresponsive, nonverbal, moaning.         ED Course        Procedures      The patient has signs of Septic Shock    The patient has signs of septic shock as evidenced by:  1. Presence of Sepsis, AND  2. Lactic Acidosis with value greater than or equal to 4    Time septic shock diagnosis confirmed = 1811  09/15/20   as this was the time when Lactate was resulted and the level was greater than or equal to 4    3 Hour Septic Shock Bundle Completion:  1. Initial Lactic Acid Result:   Recent Labs   Lab Test 09/15/20  2102 09/15/20  1811   LACT 3.4* 6.3*     2. Blood Cultures before Antibiotics: Yes  3. Broad Spectrum Antibiotics Administered:  yes       Anti-infectives (From admission through now)    Start     Dose/Rate Route Frequency Ordered Stop    09/15/20 1825  piperacillin-tazobactam (ZOSYN) intermittent infusion 4.5 g      4.5 g  over 30 Minutes Intravenous ONCE 09/15/20 1824 09/15/20 1925           4. IF patient is in septic shock within 6 hours of time zero, as defined by:  -Initial Hypotension:  2 low BP readings (SBP <90, MAP <65, or decrease > 40 from baseline due to infection) within 3 hrs of each other during the time period of 6hrs before and 6 hrs  after time zero  -Lactate > or = 4  THEN: Full bolus NOT administered due to 2500 ml of IV fluids given    BMI Readings from Last 1 Encounters:   09/10/20 26.68 kg/m      30 mL/kg fluids based on weight: 2,830 mL  30 mL/kg fluids based on IBW (must be >= 60 inches tall): 2,470 mL    Septic Shock reassessment:  1. Repeat Lactic Acid Level: 3.4           Results for orders placed or performed during the hospital encounter of 09/15/20 (from the past 24 hour(s))   CBC with platelets differential   Result Value Ref Range    WBC 16.8 (H) 4.0 - 11.0 10e9/L    RBC Count 3.26 (L) 4.4 - 5.9 10e12/L    Hemoglobin 9.7 (L) 13.3 - 17.7 g/dL    Hematocrit 29.4 (L) 40.0 - 53.0 %    MCV 90 78 - 100 fl    MCH 29.8 26.5 - 33.0 pg    MCHC 33.0 31.5 - 36.5 g/dL    RDW 14.5 10.0 - 15.0 %    Platelet Count 184 150 - 450 10e9/L    Diff Method Automated Method     % Neutrophils 91.3 %    % Lymphocytes 2.2 %    % Monocytes 5.6 %    % Eosinophils 0.0 %    % Basophils 0.1 %    % Immature Granulocytes 0.8 %    Nucleated RBCs 0 0 /100    Absolute Neutrophil 15.3 (H) 1.6 - 8.3 10e9/L    Absolute Lymphocytes 0.4 (L) 0.8 - 5.3 10e9/L    Absolute Monocytes 0.9 0.0 - 1.3 10e9/L    Absolute Basophils 0.0 0.0 - 0.2 10e9/L    Abs Immature Granulocytes 0.1 0 - 0.4 10e9/L    Absolute Nucleated RBC 0.0    Comprehensive metabolic panel   Result Value Ref Range    Sodium 129 (L) 133 - 144 mmol/L    Potassium 3.9 3.4 - 5.3 mmol/L    Chloride 95 94 - 109 mmol/L    Carbon Dioxide 22 20 - 32 mmol/L    Anion Gap 12 3 - 14 mmol/L    Glucose 125 (H) 70 - 99 mg/dL    Urea Nitrogen 21 7 - 30 mg/dL    Creatinine 1.82 (H) 0.66 - 1.25 mg/dL    GFR Estimate 32 (L) >60 mL/min/[1.73_m2]    GFR Estimate If  Black 38 (L) >60 mL/min/[1.73_m2]    Calcium 8.7 8.5 - 10.1 mg/dL    Bilirubin Total 2.0 (H) 0.2 - 1.3 mg/dL    Albumin 2.8 (L) 3.4 - 5.0 g/dL    Protein Total 6.2 (L) 6.8 - 8.8 g/dL    Alkaline Phosphatase 83 40 - 150 U/L    ALT 13 0 - 70 U/L    AST 21 0 - 45 U/L   Lactic acid whole blood   Result Value Ref Range    Lactic Acid 6.3 (HH) 0.7 - 2.0 mmol/L   CRP inflammation   Result Value Ref Range    CRP Inflammation 131.0 (H) 0.0 - 8.0 mg/L   Blood culture    Specimen: Blood    Left Arm   Result Value Ref Range    Specimen Description Blood Left Arm     Culture Micro PENDING    TSH with free T4 reflex   Result Value Ref Range    TSH 2.13 0.40 - 4.00 mU/L   Blood culture    Specimen: Blood    Right Arm   Result Value Ref Range    Specimen Description Blood Right Arm     Culture Micro PENDING    XR Chest Port 1 View    Narrative    CHEST ONE VIEW  9/15/2020 7:36 PM     HISTORY: Sepsis.    COMPARISON: None.      Impression    IMPRESSION: Elevated right hemidiaphragm. Question some minimal  atelectasis and/or infiltrate at the left base, although this is not  certain. Left lung clear.    ALEXANDRA RODGERS MD   CT Head w/o Contrast   Result Value Ref Range    Radiologist flags Findings concerning for acute intracranial (AA)     Narrative    CT SCAN OF THE HEAD WITHOUT CONTRAST   9/15/2020 8:04 PM     HISTORY: Seizure, new, nontraumatic, greater than 40 years    TECHNIQUE:  Axial images of the head and coronal reformations without  IV contrast material. Radiation dose for this scan was reduced using  automated exposure control, adjustment of the mA and/or kV according  to patient size, or iterative reconstruction technique.    COMPARISON: None.    FINDINGS: There is an ovoid hyperdense focus involving the cortex of  the right frontal pole (series 5 image 18) most concerning for small  acute parenchymal hemorrhage. Other considerations such as a  hyperdense mass or slow-flow vascular malformation/cavernoma cannot  be  completely excluded. This lesion measures approximately 9 mm x 6 mm x  9 mm. There is encephalomalacia in the left paraventricular region,  primarily adjacent to the body and atrium of the left lateral  ventricle, with ex vacuo dilatation of the left lateral ventricle. No  evidence for hydrocephalus. The ventricles are otherwise normal in  size and configuration. Moderate generalized brain parenchymal volume  loss and mild to moderate scattered hypoattenuation in the  periventricular and deep cerebral white matter likely representing  chronic small vessel ischemic disease. No extra-axial fluid collection  or significant mass effect/midline shift. Scattered atherosclerotic  disease primarily involving the carotid siphons.    The visualized aspects of the paranasal sinuses and mastoids are  clear. Bilateral lens replacements. The calvarium and skull base are  grossly intact.      Impression    IMPRESSION:  1. Ovoid hyperdense parenchymal lesion involving the right frontal  pole, most concerning for small acute parenchymal hemorrhage. Given  the absence of a history of trauma, spontaneous parenchymal hemorrhage  in the setting of cerebral amyloid angiopathy must be considered.  Other considerations such as a hyperdense mass or slow-flow vascular  malformation such as a cavernoma are not completely excluded.  Recommend MRI without and with contrast for further characterization.  2. No other definite acute intracranial findings.  3. Left periventricular encephalomalacia with ex vacuo dilatation of  the left lateral ventricle.  4. Generalized brain parenchymal volume loss and presumed chronic  small vessel ischemic changes.    [Critical Result: Findings concerning for acute intracranial  hemorrhage]    Finding was identified on 9/15/2020 8:09 PM.     Dr. Gavin was contacted by Dr. Justin on 9/15/2020 8:20 PM and  verbalized understanding of the critical result.    UA with Microscopic   Result Value Ref Range     Color Urine Mirna     Appearance Urine Cloudy     Glucose Urine Negative NEG^Negative mg/dL    Bilirubin Urine Negative NEG^Negative    Ketones Urine Negative NEG^Negative mg/dL    Specific Gravity Urine 1.013 1.003 - 1.035    Blood Urine Moderate (A) NEG^Negative    pH Urine 7.0 5.0 - 7.0 pH    Protein Albumin Urine 100 (A) NEG^Negative mg/dL    Urobilinogen mg/dL 0.0 0.0 - 2.0 mg/dL    Nitrite Urine Negative NEG^Negative    Leukocyte Esterase Urine Large (A) NEG^Negative    Source Catheterized Urine     WBC Urine 10 TO 25 0 - 5 /HPF    RBC Urine 2 TO 5 0 - 2 /HPF    Bacteria Urine Moderate (A) NEG^Negative /HPF    Amorphous Crystals Many (A) NEG^Negative /HPF   Sodium random urine   Result Value Ref Range    Sodium Urine mmol/L 61 mmol/L   Lactic acid whole blood   Result Value Ref Range    Lactic Acid 3.4 (H) 0.7 - 2.0 mmol/L       Medications   0.9% sodium chloride BOLUS (0 mLs Intravenous Stopped 9/15/20 1924)     Followed by   sodium chloride 0.9% infusion (has no administration in time range)   LORazepam (ATIVAN) injection 0.5 mg (has no administration in time range)   labetalol (NORMODYNE/TRANDATE) injection 10 mg (has no administration in time range)   acetaminophen (TYLENOL) Suppository 650 mg (650 mg Rectal Given 9/15/20 1823)   piperacillin-tazobactam (ZOSYN) intermittent infusion 4.5 g (0 g Intravenous Stopped 9/15/20 1925)   0.9% sodium chloride BOLUS (0 mLs Intravenous Stopped 9/15/20 2042)   0.9% sodium chloride BOLUS (500 mLs Intravenous New Bag 9/15/20 2043)       Assessments & Plan (with Medical Decision Making)  Diallo Villarreal is a 88 year old with a history of cerebral palsy who presented to the ED from his nursing home for confusion and possible seizure.  He has been more lethargic the last couple days and at the nursing home today staff noted seizure-like activity so they called EMS.  On arrival to the ED he had a temp of 101.4  F.  He was tachycardic in the 110s.  Blood pressure within  normal limits.  O2 saturations were mildly low at 90% so he was placed on O2 via nasal cannula and maintained saturations in the upper 90s afterwards.  On exam initially he was completely obtunded and unresponsive to verbal or physical stimuli.  He had coarse breathing with a coarse cough and rales in the left lung base.  Urine in suprapubic catheter tubing with sediment concerning for UTI.  Given his fever and tachycardia IV was established and bolus of fluids given.  Rectal Tylenol given for fever.  Labs drawn per sepsis protocol.  Labs came back notable for a white count of 16.8 with left shift.  His lactic acid was critically elevated at 6.3, .  Findings concerning for severe sepsis so IV Zosyn ordered for broad-spectrum coverage of pneumonia and possible concurrent urinary source.  Other notable labs included creatinine of 1.82, baseline appears to be 0.9.  Sodium was low at 129.  Chest x-ray was obtained and showed subtle infiltrate in left lung base confirming pneumonia as well.  Urinalysis was initially difficult to obtain as we wanted a specimen directly from the bladder.  Once collected however this did show moderate blood, large leukocyte Estrace, 10-25 WBCs and moderate bacteria, concerning for potential UTI. Culture currently pending. Given the concern for seizure and altered mental status a head CT was also obtained and this actually showed abnormal findings in the right frontal lobe concerning for parenchymal hemorrhage.  Initially I was not sure if he had a seizure or if he had rigors related to severe sepsis.  With this head bleed he certainly could have had a seizure.  I called and spoke with stroke neurologist Dr. Culp.  He also was not convinced if the patient had a seizure or not.  His altered mental status could be related to a postictal state or could be related to severe sepsis.  It was recommended that a head MRI be obtained to evaluate abnormal CT findings further since images were  not definitively clear that this was a hemorrhage or not.  Dr. Culp did not feel that transfer to higher echelon of care is necessary for this patient who is DNR/DNI per his daughter Josie, as primary goal is comfort and conservative management if possible.  Dr. Culp recommended keeping his blood pressure below 140 systolic, obtaining an MRI of the head with contrast in the morning and stopping his aspirin.  He also recommended obtaining a prolactin now and then rechecking it in the morning.  If the prolactin is lower in the morning that it is likely the patient had a seizure in which case he recommended starting Keppra 500 mg twice daily.  He did not think a loading dose of Keppra was necessary at this time.  With IV fluids, Tylenol, and antibiotics, the patient did become more alert and was sitting with his eyes open but still unable to verbalize anything, showing significant confusion and just moaning.  I spoke with his daughter Josie and she stated this was very abnormal for him as usually he is talkative and alert.  She was comfortable with the plan for hospitalization here to treat patient's sepsis and complete a head MRI in the morning for further evaluation of his abnormal CT scan.  I called and spoke with hospitalist Dr. Barbosa, who accepted patient onto his service for further care.  Staffed in the ED with Dr. Salinas.     I have reviewed the nursing notes.    I have reviewed the findings, diagnosis, plan and need for follow up with the patient.       ED to Inpatient Handoff:    Discussed with Dr. Barbosa at 2000  Patient accepted for Inpatient Stay  Pending studies include cultures  Code Status: DNR/DNI           New Prescriptions    No medications on file       Final diagnoses:   Severe sepsis (H)   Pneumonia   Acute kidney injury (H)   Hyponatremia   Encephalopathy     Note: Chart documentation done in part with Dragon Voice Recognition software. Although reviewed after completion, some word and grammatical  errors may remain.     9/15/2020   [facility] EMERGENCY DEPARTMENT     Dayna Nicholas PA-C  09/15/20 [id number]

## 2020-09-15 NOTE — ED TRIAGE NOTES
Brought in from [facility] with altered mental status. Staff reporting a seizure activity prior calling EMS

## 2020-09-16 NOTE — PROGRESS NOTES
S-(situation): Orders received for clinical bedside swallow evaluation 09/16/20.     B-(background): Diallo is a 88 year old male with PMH cerebral palsy who resides at a long-term care facility, hx of recurrent UTI with suprapubic catheter who presents with altered mental status.    A-(assessment): SLP attempted to see patient this PM for swallow evaluation, however, patient refusing to participate.    R-(recommendations): Recommend continuing NPO status. SLP will attempt evaluation in AM.      Thank you for this referral!    Kindra Melo MA, [facility]-SLP  [facility]  [phone]

## 2020-09-16 NOTE — PROVIDER NOTIFICATION
DATE:  9/16/2020   TIME OF RECEIPT FROM LAB:  0605  LAB TEST:  lact  LAB VALUE:  2.5  RESULTS GIVEN WITH READ-BACK TO (PROVIDER):  Dr. Barbosa  TIME LAB VALUE REPORTED TO PROVIDER:   0607

## 2020-09-16 NOTE — PROVIDER NOTIFICATION
DATE:  9/16/2020   TIME OF RECEIPT FROM LAB:  0635  LAB TEST:  BC  LAB VALUE:  Positive - gram negative rods  RESULTS GIVEN WITH READ-BACK TO (PROVIDER):  Dr. Barbosa  TIME LAB VALUE REPORTED TO PROVIDER:   0639

## 2020-09-16 NOTE — PHARMACY-VANCOMYCIN DOSING SERVICE
Pharmacy Vancomycin Initial Note  Date of Service 2020  Patient's  1932  88 year old, male    Indication: Bacteremia    Current estimated CrCl = Estimated Creatinine Clearance: 27.3 mL/min (A) (based on SCr of 2.13 mg/dL (H)).    Creatinine for last 3 days  9/15/2020:  6:11 PM Creatinine 1.82 mg/dL  2020: 12:11 AM Creatinine 1.87 mg/dL;  5:34 AM Creatinine 2.13 mg/dL    Recent Vancomycin Level(s) for last 3 days  No results found for requested labs within last 72 hours.      Vancomycin IV Administrations (past 72 hours)      No vancomycin orders with administrations in past 72 hours.                Nephrotoxins and other renal medications (From now, onward)    Start     Dose/Rate Route Frequency Ordered Stop    20 1130  vancomycin (VANCOCIN) 2,000 mg in sodium chloride 0.9 % 500 mL intermittent infusion      2,000 mg  over 2 Hours Intravenous ONCE 20 1114      20 1112  vancomycin place power - receiving intermittent dosing      1 each Intravenous SEE ADMIN INSTRUCTIONS 20 1114      20 0100  piperacillin-tazobactam (ZOSYN) infusion 3.375 g      3.375 g  over 1 Hours Intravenous EVERY 6 HOURS 09/15/20 2232            Contrast Orders - past 72 hours (72h ago, onward)    None                Plan:  1.  Start vancomycin  2000 mg IV ONCE followed by intermittent dosing.   2.  Goal Trough Level: 15-20 mg/L   3.  Pharmacy will check trough levels as appropriate in 1-3 Days.    4. Serum creatinine levels will be ordered daily for the first week of therapy and at least twice weekly for subsequent weeks.    5. Fleming method utilized to dose vancomycin therapy: Method 2. Intermittent dosing due to RAJANI.    Javi Weaver [facility]

## 2020-09-16 NOTE — PROVIDER NOTIFICATION
DATE:  9/16/2020   TIME OF RECEIPT FROM LAB:  0637  LAB TEST:  Blood Cultures drawn for the R arm  LAB VALUE:  Positive. Gram Negative Rods  RESULTS GIVEN WITH READ-BACK TO (PROVIDER):  Frank Barbosa MD  TIME LAB VALUE REPORTED TO PROVIDER:   0621

## 2020-09-16 NOTE — PROVIDER NOTIFICATION
DATE:  9/16/2020   TIME OF RECEIPT FROM LAB:  0848  LAB TEST:  verigen results   LAB VALUE:  BC-came back with proteus  RESULTS GIVEN WITH READ-BACK TO (PROVIDER):  Dr. Manning  TIME LAB VALUE REPORTED TO PROVIDER:   0900  Marcos Kinsey RN

## 2020-09-16 NOTE — PROGRESS NOTES
[facility]    Hospitalist Progress Note    Date of Service (when I saw the patient): 09/16/2020    Assessment & Plan   Diallo Villarreal is a 88 year old male with PMH cerebral palsy who resides at a long-term care facility, hx of recurrent UTI with suprapubic catheter who presents with altered mental status. The patient's nursing home reported that he has been more lethargic recently. They also reported seizure-like activity today when EMS arrived, he has no reported history of seizures. The patient was unable to provide any history.     # Septic Shock (as evidenced by fever, tachycardia, leukocytosis, lactate >4 and hyotension.) likely due to complicated UTI with suprapubic catheter  WBC 16.8, now 26.9  Lactate  2.5<- 3.4 <- 6.3    UA large leuks, moderate bacteria  COVID-19 sent in EMERGENCY DEPARTMENT and neg, stop precautions.  Blood culture sent in EMERGENCY DEPARTMENT, GNR times 2  Urine culture sent in ED  - cont IV Zosyn  - hold Metoprolol for now.with hypotension  - monitor culture and COVID-19 data  - maintain isolation pending COVID-19 result  - acute kidney injury   -fluid flush another liter, that will be 3 liters since arrival to floor.  No improvement and will move to ICU, will need pressors and central line access.  Discussed with daughter Josie.  She is ok with plan, no cpr or intubation or heroics.  Is aware this is life threatening.    - check echocardiogram    - once more stable consider imaging abdominal and pelvis if not responding.     # Metabolic Encephalopathy likely multifactorial with Cerebral Palsy and acute sepsis, suspected UTI, RAJANI, hyponatremia, and abnormal CT Head findings    # Seizure-like Activity, questionable, suspect more likely rigors.  Am prolactin pending.  Hold on AEM for now.     # Cerebral Palsy  CT Head with hyperdense lesion in brain, concerning for bleed  ED team spoke with Dr. Culp (Stroke Neurology)  - MRI head with  "contrast, not stable enough to be done.  - SBP goal < 140  - prn Labetalol  - stop aspirin  - check Prolactin now and in AM  - start Keppra 500mg PO BID if Prolactin is lower in the AM     # ACUTE KIDNEY INJURY due to sepsis  Creat 1.82  - cont IVF NS @ 125cc/hr  - hold Lasix  - monitor creat  - avoid nephrotoxic medications     # Hyponatremia  Na 129  TSH wnl  - check serum osm  - check urine Na and osm  - repeat BMP tonight     # FEN  - NPO pending SLP eval    DVT Prophylaxis: Heparin SQ  Code Status: No CPR- Do NOT Intubate      Disposition Plan   Expected discharge in 3+ days to ?.     Entered: Get Manning 09/16/2020, 12:32 PM       CC time 95\"      Get Manning MD  Hospitalist  Pager [phone]  Text Page    Interval History   Patient unable to give much history.  Denies pain or short of breath.  No abdominal pain.       -Data reviewed today: I reviewed all new labs and imaging results over the last 24 hours. I personally reviewed no images or EKG's today.    Medications reviewed.     Physical Exam   Temp: 97.8  F (36.6  C) Temp src: Oral BP: (!) 70/30 Pulse: 76   Resp: 24 SpO2: 94 % O2 Device: Nasal cannula Oxygen Delivery: 2 LPM  Vitals:    09/15/20 2235   Weight: 91.5 kg (201 lb 11.5 oz)     Vital Signs with Ranges  Temp:  [96.8  F (36  C)-101.8  F (38.8  C)] 97.8  F (36.6  C)  Pulse:  [] 76  Resp:  [16-26] 24  BP: ()/() 70/30  SpO2:  [93 %-99 %] 94 %  I/O last 3 completed shifts:  In: 844 [I.V.:844]  Out: 50 [Urine:50]    Constitutional: Lethargic and arousable      Respiratory: Clear to auscultation bilaterally, no crackles or wheezing   Cardiovascular: Regular rate and rhythm, normal S1 and S2   Abdomen: Normal bowel sounds, soft, non-distended, non-tender, no hepatosplenomegaly. Suprapubic cath site ok, bloody drainage noted.     Skin: No rashes, no cyanosis, dry to touch   Neuro: Alert and oriented x3,    Extremities: No edema, normal range of motion   Other(s):        All " other systems: Negative       Medications     sodium chloride 75 mL/hr at 09/16/20 0510       sodium chloride 0.9%  1,000 mL Intravenous Once     atorvastatin  20 mg Oral QPM     cinacalcet  30 mg Oral Daily     docusate sodium  100 mg Oral BID     pantoprazole  40 mg Oral QAM AC     piperacillin-tazobactam  3.375 g Intravenous Q6H     sodium chloride (PF)  3 mL Intracatheter Q8H     vancomycin (VANCOCIN) IV  2,000 mg Intravenous Once     vancomycin place power - receiving intermittent dosing  1 each Intravenous See Admin Instructions     vitamin D3  2,000 Units Oral Daily       Data   Recent Labs   Lab 09/16/20  0951 09/16/20  0534 09/16/20  0011 09/15/20  1811   WBC  --  26.9*  --  16.8*   HGB  --  8.9*  --  9.7*   MCV  --  91  --  90   PLT  --  134*  --  184   NA  --  135 134 129*   POTASSIUM 4.1 3.4 3.0* 3.9   CHLORIDE  --  105 101 95   CO2  --  22 22 22   BUN  --  25 24 21   CR  --  2.13* 1.87* 1.82*   ANIONGAP  --  8 11 12   MARIN  --  7.9* 7.8* 8.7   GLC  --  88 87 125*   ALBUMIN  --  2.2*  --  2.8*   PROTTOTAL  --  5.2*  --  6.2*   BILITOTAL  --  1.9*  --  2.0*   ALKPHOS  --  58  --  83   ALT  --  14  --  13   AST  --  28  --  21       Imaging:  Recent Results (from the past 24 hour(s))   XR Chest Port 1 View    Narrative    CHEST ONE VIEW  9/15/2020 7:36 PM     HISTORY: Sepsis.    COMPARISON: None.      Impression    IMPRESSION: Elevated right hemidiaphragm. Question some minimal  atelectasis and/or infiltrate at the left base, although this is not  certain. Left lung clear.    ALEXANDRA RODGERS MD   CT Head w/o Contrast   Result Value    Radiologist flags Findings concerning for acute intracranial (AA)    Narrative    CT SCAN OF THE HEAD WITHOUT CONTRAST   9/15/2020 8:04 PM     HISTORY: Seizure, new, nontraumatic, greater than 40 years    TECHNIQUE:  Axial images of the head and coronal reformations without  IV contrast material. Radiation dose for this scan was reduced using  automated exposure control,  adjustment of the mA and/or kV according  to patient size, or iterative reconstruction technique.    COMPARISON: None.    FINDINGS: There is an ovoid hyperdense focus involving the cortex of  the right frontal pole (series 5 image 18) most concerning for small  acute parenchymal hemorrhage. Other considerations such as a  hyperdense mass or slow-flow vascular malformation/cavernoma cannot be  completely excluded. This lesion measures approximately 9 mm x 6 mm x  9 mm. There is encephalomalacia in the left paraventricular region,  primarily adjacent to the body and atrium of the left lateral  ventricle, with ex vacuo dilatation of the left lateral ventricle. No  evidence for hydrocephalus. The ventricles are otherwise normal in  size and configuration. Moderate generalized brain parenchymal volume  loss and mild to moderate scattered hypoattenuation in the  periventricular and deep cerebral white matter likely representing  chronic small vessel ischemic disease. No extra-axial fluid collection  or significant mass effect/midline shift. Scattered atherosclerotic  disease primarily involving the carotid siphons.    The visualized aspects of the paranasal sinuses and mastoids are  clear. Bilateral lens replacements. The calvarium and skull base are  grossly intact.      Impression    IMPRESSION:  1. Ovoid hyperdense parenchymal lesion involving the right frontal  pole, most concerning for small acute parenchymal hemorrhage. Given  the absence of a history of trauma, spontaneous parenchymal hemorrhage  in the setting of cerebral amyloid angiopathy must be considered.  Other considerations such as a hyperdense mass or slow-flow vascular  malformation such as a cavernoma are not completely excluded.  Recommend MRI without and with contrast for further characterization.  2. No other definite acute intracranial findings.  3. Left periventricular encephalomalacia with ex vacuo dilatation of  the left lateral ventricle.  4.  Generalized brain parenchymal volume loss and presumed chronic  small vessel ischemic changes.    [Critical Result: Findings concerning for acute intracranial  hemorrhage]    Finding was identified on 9/15/2020 8:09 PM.     Dr. Gavin was contacted by Dr. Justin on 9/15/2020 8:20 PM and  verbalized understanding of the critical result.     ALEXANDRA JUSTIN MD

## 2020-09-16 NOTE — CONSULTS
CARE TRANSITION SOCIAL WORK INITIAL ASSESSMENT:          Met with: Family- daughterJosie, over the phone.       DATA  Principal Problem:    Sepsis (H)  Active Problems:    Cerebral palsy (H)    Complicated UTI (urinary tract infection)    Altered mental status    Seizure-like activity (H)    Acute kidney injury (H)    Hyponatremia    Metabolic encephalopathy       Primary Care Clinic Name: [facility]   Primary Care MD Name: Jennifer Bryant- CNP   Contact information and PCP information verified: Yes      ASSESSMENT  Cognitive Status: awake and disoriented.       Resources List: Skilled Nursing Facility     Description of Support System: Supportive, Involved     Who is your support system?: Children     Support Assessment: Adequate family and caregiver support, Adequate social supports     Insurance Concerns: No Insurance issues identified       This writer spoke with patient's daughter, Josie, and introduced self and role. Discussed discharge planning and current living situation.  Patient is a long term care resident at [facility] in [place].  Daughter plans that patient will return there after the hospital stay. Patient unable to be a part of discharge planning at this time due to his current mental status.      Also discussed that patient has [employer] insurance that will cover transport back to the nursing home.  Care Transitions will assist in setting up transport for day of discharge.      PLAN    Patient will return to long term care at [facility] in [place] when medically stable.          JAMES Nixon  [facility]   [phone]

## 2020-09-16 NOTE — PROGRESS NOTES
S- Transfer to  from 249.    B- Sepsis    A- Brief systems assessment: More alert, interacting this afternoon. BP labile /40-60. Temp max 99.2. Complaints of abdominal pain this am, denies after. Red/purple discolored buttocks with excoriation on upper back thighs and jose-area.  Preventative sacral dressing in place. Turned and repositioned every 2 hours.  Suprapubic catheter replaced this afternoon, only had 2 ml of saline in balloon when old one removed. Urine output 200 ml since admission.    R- Transfer to ICU per physician orders. Continue to monitor pt and update physician as needed. All belongings moved with patient. Daughter present.      Code status: DNR / DNI  Skin: Red/Purple excoriated buttocks and red excoriated skin around Suprapubic catheter, barrier film applied.  Fall Risk: Yes- Department fall risk interventions implemented.  Isolation and Signage: Contact  Medication drips upon transfer: Plan for central and arterial line for anticipated levophed infusion  Blue Bin checked and medications transfer out with patient-yes  Marcos Kinsey RN

## 2020-09-16 NOTE — H&P
[facility]    History and Physical  Hospitalist       Date of Admission:  9/15/2020    Assessment & Plan   Diallo Villarreal is a 88 year old male with PMH cerebral palsy who resides at a long-term care facility, hx of recurrent UTI with suprapubic catheter who presents with altered mental status. The patient's nursing home reported that he has been more lethargic recently. They also reported seizure-like activity today when EMS arrived, he has no reported history of seizures. The patient was unable to provide any history.    # Sepsis (as evidenced by fever, tachycardia, leukocytosis) likely due to complicated UTI with suprapubic catheter  WBC 16.8  Lactate 3.4 <- 6.3    UA large leuks, moderate bacteria  COVID-19 sent in ED  Blood culture sent in ED  Urine culture sent in ED  - cont IV Zosyn  - hold Metoprolol  - monitor culture and COVID-19 data  - maintain isolation pending COVID-19 result    # Metabolic Encephalopathy likely multifactorial with Cerebral Palsy and acute sepsis, suspected UTI, RAJANI, hyponatremia, and abnormal CT Head findings  # Seizure-like Activity  # Cerebral Palsy  CT Head shows hyperdense lesion  ED team spoke with Dr. Culp (Stroke Neurology)  - MRI head with contrast  - SBP goal < 140  - prn Labetalol  - stop aspirin  - check Prolactin now and in AM  - start Keppra 500mg PO BID if Prolactin is lower in the AM    # RAJANI  Creat 1.82  - cont IVF NS @ 75cc/hr  - hold Lasix  - monitor creat  - avoid nephrotoxic medications    # Hyponatremia  Na 129  TSH wnl  - cont IVF NS @ 75cc/hr  - check serum osm  - check urine Na and osm  - repeat BMP tonight    # FEN  - NPO pending SLP eval    # Dispo  - admit to inpatient for sepsis    DVT Prophylaxis: Pneumatic Compression Devices  Code Status: DNR / DNI  Expected discharge: 2 - 3 days, recommended to prior living arrangement once SIRS/Sepsis treated.    Frank Barbosa MD    Primary Care Physician   Jennifer POST  Manny    Chief Complaint   AMS    Unable to obtain a history from the patient due to mental status    History of Present Illness   Diallo Villarreal is a 88 year old male with PMH cerebral palsy who resides at a long-term care facility, hx of recurrent UTI with suprapubic catheter who presents with altered mental status. The patient's nursing home reported that he has been more lethargic recently. They also reported seizure-like activity today when EMS arrived, he has no reported history of seizures. The patient was unable to provide any history.    Past Medical History    I have reviewed this patient's medical history and updated it with pertinent information if needed.   Past Medical History:   Diagnosis Date     AAA (abdominal aortic aneurysm) (H)      Celiac sprue      HTN (hypertension)        Past Surgical History   I have reviewed this patient's surgical history and updated it with pertinent information if needed.  Past Surgical History:   Procedure Laterality Date     LEFT CAROTID ENDARTERECTOMY       TONSILLECTOMY         Prior to Admission Medications   Prior to Admission Medications   Prescriptions Last Dose Informant Patient Reported? Taking?   acetaminophen (TYLENOL) 500 MG tablet   No No   Sig: Take 2 tablets (1,000 mg) by mouth 3 times daily as needed for mild pain   aspirin 81 MG EC tablet 9/15/2020 at 0950  Yes Yes   Sig: Take 81 mg by mouth daily    atorvastatin (LIPITOR) 40 MG tablet 9/14/2020 at 1900  No Yes   Sig: Take 0.5 tablets (20 mg) by mouth daily   cetirizine (ZYRTEC) 10 MG tablet   Yes No   Sig: Take 5 mg by mouth daily as needed    cinacalcet (SENSIPAR) 30 MG tablet 9/15/2020 at 0950  Yes Yes   Sig: Take 30 mg by mouth daily   ferrous sulfate (FEROSUL) 325 (65 Fe) MG tablet 9/15/2020 at 0950  Yes Yes   Sig: Take 325 mg by mouth daily (with breakfast)   furosemide (LASIX) 20 MG tablet 9/15/2020 at 0950  Yes Yes   Sig: Take 10 mg by mouth daily    lidocaine (LMX4) 4 % external cream    Yes No   Sig: Apply topically 2 times daily Also BID PRN   metoprolol tartrate (LOPRESSOR) 50 MG tablet 9/15/2020 at 0950  Yes Yes   Sig: Take 50 mg by mouth 2 times daily   mometasone (ELOCON) 0.1 % external ointment 6/30/2020 at 1730  Yes No   Sig: Apply topically 2 times daily   nitroGLYcerin (NITROSTAT) 0.4 MG sublingual tablet   Yes No   Sig: Place 0.4 mg under the tongue every 5 minutes as needed for chest pain For chest pain place 1 tablet under the tongue every 5 minutes for 3 doses. If symptoms persist 5 minutes after 1st dose call 911.   pantoprazole (PROTONIX) 40 MG EC tablet 9/14/2020 at 1700  Yes Yes   Sig: Take 40 mg by mouth daily   senna-docusate (SENOKOT-S/PERICOLACE) 8.6-50 MG tablet 9/15/2020 at 0950  Yes Yes   Sig: Take 1 tablet by mouth daily   vitamin D3 (CHOLECALCIFEROL) 1000 units (25 mcg) tablet 9/15/2020 at 0950  Yes Yes   Sig: Take 2,000 Units by mouth daily      Facility-Administered Medications: None     Allergies   Allergies   Allergen Reactions     Gluten Meal Other (See Comments)     Celiac disease     Wheat Extract Other (See Comments)       Social History   I have reviewed this patient's social history and updated it with pertinent information if needed. Diallo Villarreal  reports that he has quit smoking. He has never used smokeless tobacco. He reports that he does not drink alcohol.    Family History   I have reviewed this patient's family history and updated it with pertinent information if needed.   History reviewed. No pertinent family history.    Review of Systems   The 10 point Review of Systems is negative other than noted in the HPI.     Physical Exam   Temp: 101.4  F (38.6  C) Temp src: Rectal BP: (!) 149/83 Pulse: 105   Resp: 26 SpO2: 99 %      Vital Signs with Ranges  Temp:  [101.4  F (38.6  C)] 101.4  F (38.6  C)  Pulse:  [] 105  Resp:  [16-26] 26  BP: (116-149)/(68-86) 149/83  SpO2:  [98 %-99 %] 99 %  0 lbs 0 oz    Constitutional: Awake, non-verbal,  NAD  Respiratory: Clear to auscultation bilaterally on limited exam 2/2 poor cooperation  Cardiovascular: Regular rhythm, tachycardic  GI: Soft, non-distended, non-tender, normal bowel sounds.  : suprapubic catheter site clean, no surrounding erythema, no discharge noted. Urine bag with dark yellow output, no sediments, no gross blood visualized  Skin: No rashes, no cyanosis, no edema.  Musculoskeletal: chronic contractures  Neurologic: unable to fully assess  Psychiatric: non-verbal    Data   Data reviewed today:  I personally reviewed the head CT image(s) showing hyperdense lesion.  Recent Labs   Lab 09/15/20  1811   WBC 16.8*   HGB 9.7*   MCV 90      *   POTASSIUM 3.9   CHLORIDE 95   CO2 22   BUN 21   CR 1.82*   ANIONGAP 12   MARIN 8.7   *   ALBUMIN 2.8*   PROTTOTAL 6.2*   BILITOTAL 2.0*   ALKPHOS 83   ALT 13   AST 21       Recent Results (from the past 24 hour(s))   XR Chest Port 1 View    Narrative    CHEST ONE VIEW  9/15/2020 7:36 PM     HISTORY: Sepsis.    COMPARISON: None.      Impression    IMPRESSION: Elevated right hemidiaphragm. Question some minimal  atelectasis and/or infiltrate at the left base, although this is not  certain. Left lung clear.

## 2020-09-16 NOTE — ANESTHESIA PROCEDURE NOTES
ARTERIAL LINE PROCEDURE NOTE:      Staff -   CRNA: Nancy Blake APRN CRNA  Other Anesthesia Staff: Ankita Merlos  Performed By: CRNA and BALJEET   Pre-Procedure    Location: ICU    Procedure Times:9/16/2020 3:20 PM and 9/16/2020 4:10 PM  Pre-Anesthestic Checklist: patient identified, IV checked, site marked, risks and benefits discussed, informed consent, monitors and equipment checked, pre-op evaluation, at physician/surgeon's request and post-op pain management    Timeout  Correct Patient: Yes   Correct Procedure: Yes   Correct Site: Yes   Correct Laterality: N/A   Correct Position: Yes   Site Marked: N/A   .   Procedure Documentation  Procedure: arterial line  Diagnosis:Sepsis  ASA 4  Supine  Insertion Site:left (radial).Skin infiltrated with 1 mL of 1% lidocaine. Injection technique: ultrasound guided and Seldinger Technique  .  .  Patient Prep/Sterile Barriers; all elements of maximal sterile barrier technique followed, mask, hat, sterile gown, sterile gloves, draped, hand hygiene, chlorhexidine gluconate and isopropyl alcohol, patient draped    Assessment/Narrative    Catheter size: 20 Ga, 15cm.     Secured by suture  Tegaderm and Biopatch dressing used.    Arterial waveform: Yes IBP within 10% of NIBP: Yes    Comments:  Zoë placed by BALJEET Cain under the direct supervision of Nancy Blake CRNA. Patient tolerated procedure well. No apparent complications noted.

## 2020-09-16 NOTE — ANESTHESIA PREPROCEDURE EVALUATION
Anesthesia Pre-Procedure Evaluation    Patient: Diallo Villarreal   MRN: [id number] : 1932          Preoperative Diagnosis: * No pre-op diagnosis entered *    * No procedures listed *    Past Medical History:   Diagnosis Date     AAA (abdominal aortic aneurysm) (H)      Celiac sprue      HTN (hypertension)      Past Surgical History:   Procedure Laterality Date     LEFT CAROTID ENDARTERECTOMY       TONSILLECTOMY         Anesthesia Evaluation     .             ROS/MED HX    ENT/Pulmonary: Comment: This visit, respiratory distress/septic shock    (+)COPD, , . .    Neurologic: Comment: H/O concussion     H/O metabolic encephalopathy     Altered mental status this visit    Possible seizure activity this visit    Small head bleed this visit    (+)other neuro cerebral palsy    Cardiovascular: Comment: AAA    (+) hypertension--CAD, --. : . . . :. . Previous cardiac testing Echodate:20results:date: results:ECG reviewed date:20 results:Tachycardia date: results:          METS/Exercise Tolerance:     Hematologic: Comments: Normocytic anemia        Musculoskeletal:   (+) arthritis,  -       GI/Hepatic:         Renal/Genitourinary: Comment: obstruction uropathy    Supra pubic cath    (+) chronic renal disease, type: CRI, Pt does not require dialysis, Pt has no history of transplant,       Endo:         Psychiatric:         Infectious Disease: Comment: Septic Shock        Malignancy:         Other:                          Physical Exam  Normal systems: cardiovascular, pulmonary and dental    Airway   Mallampati: II  TM distance: >3 FB  Neck ROM: full    Dental     Cardiovascular   Rhythm and rate: regular and normal      Pulmonary    breath sounds clear to auscultation            Lab Results   Component Value Date    WBC 26.9 (H) 2020    HGB 8.9 (L) 2020    HCT 27.4 (L) 2020     (L) 2020    .0 (H) 09/15/2020     2020    POTASSIUM 4.1 2020    CHLORIDE 105  "09/16/2020    CO2 22 09/16/2020    BUN 25 09/16/2020    CR 2.13 (H) 09/16/2020    GLC 88 09/16/2020    MARIN 7.9 (L) 09/16/2020    MAG 1.0 (L) 09/16/2020    ALBUMIN 2.2 (L) 09/16/2020    PROTTOTAL 5.2 (L) 09/16/2020    ALT 14 09/16/2020    AST 28 09/16/2020    ALKPHOS 58 09/16/2020    BILITOTAL 1.9 (H) 09/16/2020    TSH 2.13 09/15/2020       Preop Vitals  BP Readings from Last 3 Encounters:   09/16/20 108/64   09/10/20 (!) 142/82   07/22/20 (!) 146/71    Pulse Readings from Last 3 Encounters:   09/16/20 76   09/10/20 60   07/22/20 71      Resp Readings from Last 3 Encounters:   09/16/20 24   09/10/20 18   07/22/20 16    SpO2 Readings from Last 3 Encounters:   09/16/20 94%   09/10/20 98%   07/22/20 91%      Temp Readings from Last 1 Encounters:   09/16/20 97.8  F (36.6  C) (Oral)    Ht Readings from Last 1 Encounters:   09/15/20 1.778 m (5' 10\")      Wt Readings from Last 1 Encounters:   09/15/20 91.5 kg (201 lb 11.5 oz)    Estimated body mass index is 28.94 kg/m  as calculated from the following:    Height as of this encounter: 1.778 m (5' 10\").    Weight as of this encounter: 91.5 kg (201 lb 11.5 oz).       Anesthesia Plan      History & Physical Review      ASA Status:  4 .             Postoperative Care      Consents                 ROCÍO Villarreal CRNA  "

## 2020-09-16 NOTE — ED NOTES
ED Nursing criteria listed below was addressed during verbal handoff:     Abnormal vitals: Yes  Abnormal results: Yes  Med Reconciliation completed: Yes  Meds given in ED: Yes  Any Overdue Meds: N/A  Core Measures: N/A  Isolation: Yes  Special needs: Yes  Skin assessment: Yes    Observation Patient  Education provided: N/A      Report given to Stew LEVY  Pt to room 249.

## 2020-09-16 NOTE — PROGRESS NOTES
On last assessment with vitals patient was noted to have low blood pressures with Maps of mid 30's to low 40's.  Charge nurse aware and MD updated.

## 2020-09-16 NOTE — PLAN OF CARE
Problem: Nutrition Impaired (Sepsis/Septic Shock)  Goal: Optimal Nutrition Intake  Outcome: No Change     Problem: Adult Inpatient Plan of Care  Goal: Absence of Hospital-Acquired Illness or Injury  Outcome: Improving  Goal: Optimal Comfort and Wellbeing  Outcome: Improving     Problem: Hemodynamic Instability (Sepsis/Septic Shock)  Goal: Effective Tissue Perfusion  Outcome: Improving     Pt remains disoriented to place, time, and situation this shift but does respond to his name.   Blood pressures have been low this morning , 500cc bolus given x1, pressures came up some. See flow sheets.   Pt flagged for lactic. Last draw was 2.5.   Very little urine output from suprapubic catheter this shift.   Pt developed a wet productive cough this morning and has been suctioned as needed.   New IV placed in the right forearm this morning.   Oxygen saturations stable on 2lpm via nasal canula.   IV maintenance fluids infusing at 75ml and hr  Telemetry shows SR.   Will continue to monitor and promote comfort as care continues.

## 2020-09-16 NOTE — ANESTHESIA PROCEDURE NOTES
CENTRAL LINE INSERTION PROCEDURE NOTE:       Staff -   CRNA: Nancy Blake APRN CRNA  Other Anesthesia Staff: Cristine Kim APRN CRNA  Performed By: CRNA and with CRNAs  Pre-Procedure    Location: ICU    Procedure Times:9/16/2020 2:30 PM and 9/16/2020 3:15 PM  Pre-Anesthestic Checklist: patient identified, IV checked, site marked, risks and benefits discussed, informed consent, monitors and equipment checked, pre-op evaluation, at physician/surgeon's request and post-op pain management    Timeout  Correct Patient: Yes   Correct Procedure: Yes   Correct Site: Yes   Correct Laterality: N/A   Correct Position: Yes   Site Marked: N/A   .   Procedure Documentation   Procedure: central line, new line and elective  Position: supine  Patient Prep/Sterile Barriers; chlorhexidine gluconate and isopropyl alcohol, patient draped, maximum sterile barriers used during central venous catheter insertion  Diagnosis:Sepsis    Insertion Site:internal jugular, right  . A permanent image is entered into the patient's record.   Skin infiltrated with 1 mL of 1% lidocaine.  Catheter: 7 Fr, 20 cm, T.L.  Assessment/Narrative         Secured by suture  Tegaderm and Biopatch dressing used.  blood aspirated from all lumens  All lumens flushed: Yes  Verification method: X-ray and Placement to be verified post-opPerson verifying: Consulting Physician  Comments:  Central line placed without any apparent complications. Patient tolerated procedure well.

## 2020-09-16 NOTE — PROVIDER NOTIFICATION
DATE:  9/16/2020   TIME OF RECEIPT FROM LAB:  1037  LAB TEST:  Blood cultures  LAB VALUE:  Gram positive cocci in pairs and chains  RESULTS GIVEN WITH READ-BACK TO (PROVIDER):  Dr. Manning  TIME LAB VALUE REPORTED TO PROVIDER:   1043

## 2020-09-16 NOTE — PROGRESS NOTES
Hospitalist Addendum  CXR to check line placement.  Central line tip in lower SVC and in good position.  Increasing infiltrate right lower lobe compared to 9/15, concern for pneumonia. On appropriate antibiotics.     Get Manning MD

## 2020-09-16 NOTE — PROGRESS NOTES
"S-(situation): Patient arrives to room 249 via cart from ED    B-(background): Increased confusion    A-(assessment): Pt arrived what appears to be completely disoriented , although this is difficult to assess as patient is non verbal at this time. When asked his birthday he is only able to repeat \"[birth year]\" in garbled speech.   Pt does not appear to have the strength to stand or assist with any type of ambulation.   Large incontinent stool on arrival.   Buttocks is reddened , and purplish on the left side , as well as the lower right. There is one small pea sized red sore to the very upper left posterior thigh just below the buttock. Skin to the buttock on arrival also moist and with flaky skin. A mepilex was placed on arrival and pt rolled to the right side.   Skin to the anterior shins is flaky and dry and myles.   Super pubic catheter in place on arrival.   Lungs are clear with some expiratory wheezes in the upper lobes.   Oxygen saturations are stable on 1lpm via nasal canula.   IV is patent and infusing.   Bowel sounds are hypoactive.   Initial vitals are stable.  /49   Pulse 104   Temp 101.8  F (38.8  C) (Axillary)   Resp 26   Ht 1.778 m (5' 10\")   Wt 91.5 kg (201 lb 11.5 oz)   SpO2 93%   BMI 28.94 kg/m     SCD's in place.   Contact precautions in place.  Will continue to monitor and promote comfort as care continues.     R-(recommendations): Orders reviewed with patient. Will monitor patient per MD orders. yes    Inpatient nursing criteria listed below were met:    Health care directives status obtained and documented: Yes  SCD's Documented: Yes  Skin issues/needs documented:Yes  Isolation addressed and Signage used: Yes  Fall Prevention: Care plan updated, Education given and documented Yes  Care Plan initiated: Yes  Education Assessment documented:Yes  Education Documented (Pre-existing chronic infection such as, MRSA/VRE need education on admission): Yes  Allergies Reviewed: Yes  Admission " Medication Reconciliation completed: Yes  New medication patient education completed and documented (Possible Side Effects of Common Medications handout): Yes  Home medications if not able to send immediately home with family stored here: NA  Reminder note placed in discharge instructions: NA  Discharge planning review completed (admission navigator) Yes

## 2020-09-17 NOTE — PROGRESS NOTES
S-(situation): shift note    B-(background): sepsis    A-(assessment): patient alert and oriented x 1-2, he follows commands but refuses to take medications and to be turned. His lungs are clear with fine crackles to the bases he was on oxygen 2 liters but saturations  %, oxygen turned off and patient on room air saturations are 93-95%. Blood pressure 130-140/SBP on the norepinephrine so turned off drip and MAPs maintaining >65.   Patient is afebrile.     R-(recommendations): will continue to monitor vitals and resume levophed if MAP < 654 and will resume oxygen if saturations drop.

## 2020-09-17 NOTE — PLAN OF CARE
VSS and on room air. Advanced to dysphagia diet and tolerating well with a fair appetite. Telemetry showed rhythm changed and ECG was performed. See chart.

## 2020-09-17 NOTE — PLAN OF CARE
Discharge Planner SLP   Patient plan for discharge: Return to [facility]    Current status: Patient presents with moderate oropharyngeal dysphagia characterized by missing dentition and poor oral control. Aspiration risks with thin liquids. Recommend puree diet with nectar thick liquids, pills crushed (if able) and in food carrier. No straws, small sips/bites, patient to be upright and midline for all oral intake and remain upright for 30 minutes to ensure oral and pharyngeal clearance. SLP will continue to follow during LOS and advance diet as warranted.     Barriers to return to prior living situation: Medical status, altered diet    Recommendations for discharge: Recommend puree diet with nectar thick liquids, pills crushed (if able) and in food carrier. No straws, small sips/bites, patient to be upright and midline for all oral intake and remain upright for 30 minutes to ensure oral and pharyngeal clearance. Continue good oral cares.    Rationale for recommendations: To maximize safety with oral intake and reduce risk of aspiration and pneumonia.       Entered by: Kindra Melo 09/17/2020 12:04 PM

## 2020-09-17 NOTE — PROGRESS NOTES
09/17/20 1100   General Information   Onset Date 09/15/20   Start of Care Date 09/17/20   Referring Physician Frank Barbosa MD   Patient Profile Review/OT: Additional Occupational Profile Info See Profile for full history and prior level of function   Patient/Family Goals Statement Unable to state   Swallowing Evaluation Bedside swallow evaluation   Behaviorial Observations Lethargic;Confused   Mode of current nutrition NPO   Respiratory Status O2 Supply   Type of O2 supply Nasal cannula   Comments Diallo Villarreal is a 88 year old male with PMH cerebral palsy who resides at a long-term care facility, hx of recurrent UTI with suprapubic catheter who presents with altered mental status.    Clinical Swallow Evaluation   Oral Musculature anomalies present;unable to assess due to poor participation/comprehension   Dentition edentulous, does not have dentures   Additional Documentation Yes   Additional evaluation(s) completed today No   Swallow Eval   Feeding Assistance dependent   Clinical Swallow Eval: Thin Liquid Texture Trial   Mode of Presentation, Thin Liquids cup;fed by clinician   Volume of Liquid or Food Presented 2 sips   Oral Phase of Swallow Poor AP movement;other (see comments)  (weak labial seal, poor oral control, anterior loss of bolus)   Pharyngeal Phase of Swallow intact   Diagnostic Statement No overt s/sx of penetration/aspiration, however, increased risk with thin liquids d/t poor oral control.   Clinical Swallow Eval: Nectar Thick Liquid Texture Trial   Mode of Presentation, Nectar cup;fed by clinician   Volume of Nectar Presented 3 ounces   Oral Phase, Nectar WFL   Pharyngeal Phase, [place] intact   Diagnostic Statement Functional swallow with trials of nectar thick liquids. No overt s/sx of penetration/aspiration noted.   Clinical Swallow Eval: Puree Solid Texture Trial   Mode of Presentation, Puree spoon;fed by clinician   Volume of Puree Presented 4 ounces   Oral Phase, Puree WFL   Pharyngeal  Phase, Puree intact   Diagnostic Statement Funcctional swallow with trials of puree. No overt s/sx of penetration/aspiration noted. No further trials attempted d/t patient being edentulous and reduced level of alertness.   Swallow Compensations   Swallow Compensations Alternate viscosity of consistencies;Pacing;Reduce amounts   Results Oral difficulties only   General Therapy Interventions   Planned Therapy Interventions Dysphagia Treatment   Dysphagia treatment Modified diet education   Swallow Eval: Clinical Impressions   Skilled Criteria for Therapy Intervention Skilled criteria met.  Treatment indicated.   Functional Assessment Scale (FAS) 3   Dysphagia Outcome Severity Scale (NINI) Level 3 - NINI   Treatment Diagnosis oropharyngeal dysphagia   Diet texture recommendations Dysphagia diet level 1;Nectar thick liquids   Recommended Feeding/Eating Techniques alternate between small bites and sips of food/liquid;check mouth frequently for oral residue/pocketing;maintain upright posture during/after eating for 30 mins;no straws;small sips/bites   Therapy Frequency 3x/week   Predicted Duration of Therapy Intervention (days/wks) LOS   Anticipated Discharge Disposition other (see comments)  (return to [facility])   Risks and Benefits of Treatment have been explained. Yes   Patient, family and/or staff in agreement with Plan of Care Yes   Clinical Impression Comments Patient presents with moderate oropharyngeal dysphagia characterized by missing dentition and poor oral control. Aspiration risks with thin liquids. Recommend puree diet with nectar thick liquids, pills crushed (if able) and in food carrier. No straws, small sips/bites, patient to be upright and midline for all oral intake and remain upright for 30 minutes to ensure oral and pharyngeal clearance. SLP will continue to follow during LOS and advance diet as warranted.    Total Evaluation Time   Total Evaluation Time (Minutes) 20       Thank you for this  referral!    Kindra Melo MA, CCC-SLP  [facility]  [phone]

## 2020-09-17 NOTE — PROGRESS NOTES
[facility]    Hospitalist Progress Note    Date of Service (when I saw the patient): 09/17/2020    Assessment & Plan   Diallo Villarreal is a 88 year old male with PMH cerebral palsy who resides at a long-term care facility, hx of recurrent UTI with suprapubic catheter who presents with altered mental status. The patient's nursing home reported that he has been more lethargic recently. They also reported seizure-like activity today when EMS arrived, he has no reported history of seizures. The patient was unable to provide any history.     # Septic Shock (as evidenced by fever, tachycardia, leukocytosis, lactate >4 and hyotension.) likely due to complicated UTI with suprapubic catheter  WBC 16.8, now 26.9  Lactate  2.2<-2.5<- 3.4 <- 6.3   initially  UA large leuks, moderate bacteria  Blood culture sent in EMERGENCY DEPARTMENT, GNR times 2 and now growing Proteus mirabilis and Pseudomonas aeruginosa.  Lactose fermenting GNR now growing as well.  Above sensitive to Zosyn.  9/16 had sustained systolic blood pressure 70 nonresponsive to fluid boluses.  Transferred to ICU for Levophed and placement of arterial and central lines.  Now off Levo for 12 hours.  Infectious source is urine with multiple organisms related to suprapubic catheter    - cont IV Zosyn, may change to orals in next 1-2 days  - hold Metoprolol for now.with hypotension  - check echocardiogram, -limited study with ejection fraction 60% and no WMA  - Suprapubic catheter changed 9/16    # Metabolic Encephalopathy likely multifactorial with Cerebral Palsy and acute sepsis, suspected UTI, RAJANI, hyponatremia, and abnormal CT Head findings  -mental status much improved today    # Seizure-like Activity, questionable, suspect more likely rigors.  Am prolactin slightly higher therefore unlikely seizures.    # Cerebral Palsy  CT Head with hyperdense lesion in brain, concerning for bleed  ED team spoke with Dr. Culp  "(Stroke Neurology)  - MRI head with contrast, not stable enough to be done.  - SBP goal < 140  - prn Labetalol  - stop aspirin  - once out of ICU will proceed with MRI, likely 9/18     # ACUTE KIDNEY INJURY due to sepsis  Creat 1.82-->1.69  - cont IVF NS @ 75 cc/hr  - hold Lasix  - monitor creat  - avoid nephrotoxic medications     # Hyponatremia  Na 129  TSH wnl  - check serum osm  - check urine Na and osm  - repeat BMP tonight     # FEN  -dysphagia level `1 pureed nectar thickened diet per speech    DVT Prophylaxis: Heparin SQ  Code Status: No CPR- Do NOT Intubate      Disposition Plan   Expected discharge in 3+ days to ?.     Entered: Get Manning 09/17/2020, 10:53 AM       CC time 35\"      Get Manning MD  Hospitalist  Pager [phone]  Text Page    Interval History   Patient denies any pain or short of breath, cough, chest pain.  No abdominal pain or chills. Offers no other complaints.       -Data reviewed today: I reviewed all new labs and imaging results over the last 24 hours. I personally reviewed no images or EKG's today.    Medications reviewed.     Physical Exam   Temp: 97.5  F (36.4  C) Temp src: Oral BP: 104/61 Pulse: 78   Resp: 18 SpO2: 92 % O2 Device: Nasal cannula Oxygen Delivery: 1 LPM  Vitals:    09/15/20 2235 09/17/20 0400   Weight: 91.5 kg (201 lb 11.5 oz) 100.7 kg (222 lb 0.1 oz)     Vital Signs with Ranges  Temp:  [97.1  F (36.2  C)-97.5  F (36.4  C)] 97.5  F (36.4  C)  Pulse:  [] 78  Resp:  [9-33] 18  BP: ()/(61-91) 104/61  MAP:  [59 mmHg-120 mmHg] 68 mmHg  Arterial Line BP: ()/(39-90) 98/57  SpO2:  [83 %-100 %] 92 %  I/O last 3 completed shifts:  In: 3209 [I.V.:2209; IV Piggyback:1000]  Out: 775 [Urine:775]    Constitutional: Alert and interactive and conversant today, much improved.     Respiratory: Clear to auscultation bilaterally, no crackles or wheezing anteriorly   Cardiovascular: Regular rate and rhythm, normal S1 and S2   Abdomen: Normal bowel sounds, soft, " non-distended, non-tender, no hepatosplenomegaly. Suprapubic cath site ok     Skin: No rashes, no cyanosis, dry to touch   Neuro: Alert and oriented x3,    Extremities: No edema, normal range of motion   Other(s):        All other systems: Negative       Medications     IV infusion builder WITH additives       norepinephrine Stopped (20 2100)       atorvastatin  20 mg Oral QPM     cinacalcet  30 mg Oral Daily     docusate sodium  100 mg Oral BID     heparin lock flush  5-10 mL Intracatheter Q24H     pantoprazole  40 mg Oral QAM AC     piperacillin-tazobactam  3.375 g Intravenous Q6H     sodium chloride (PF)  3 mL Intracatheter Q8H     vancomycin place power - receiving intermittent dosing  1 each Intravenous See Admin Instructions     vitamin D3  2,000 Units Oral Daily       Data   Recent Labs   Lab 20  0550 20  0515 20  0951 20  0534 20  0011 09/15/20  1811   WBC 24.0*  --   --  26.9*  --  16.8*   HGB 9.0*  --   --  8.9*  --  9.7*   MCV 89  --   --  91  --  90   *  --   --  134*  --  184     --   --  135 134 129*   POTASSIUM 3.5 3.5 4.1 3.4 3.0* 3.9   CHLORIDE 110*  --   --  105 101 95   CO2 22  --   --  22 22 22   BUN 29  --   --  25 24 21   CR 1.69*  --   --  2.13* 1.87* 1.82*   ANIONGAP 8  --   --  8 11 12   MARIN 8.3*  --   --  7.9* 7.8* 8.7   *  --   --  88 87 125*   ALBUMIN  --   --   --  2.2*  --  2.8*   PROTTOTAL  --   --   --  5.2*  --  6.2*   BILITOTAL  --   --   --  1.9*  --  2.0*   ALKPHOS  --   --   --  58  --  83   ALT  --   --   --  14  --  13   AST  --   --   --  28  --  21       Imaging:  Recent Results (from the past 24 hour(s))   Echocardiogram Limited    Narrative    [id number]  [id number]  [id number]  [id number]^A           [facility]  Echocardiography Laboratory  [address] [place] [address]        Name: ARUN ANDRE  MRN: [id number]  : 1932  Study Date: 2020 01:09 PM  Age: 88  yrs  Gender: Male  Patient Location: [facility]  Reason For Study: Other, Please Specify in Comments  History: COPD, HTN  Ordering Physician: JAS KENDALL  Referring Physician: Jennifer Bryant  Performed By: Dorian Murphy     BSA: 2.1 m2  Height: 70 in  Weight: 201 lb  HR: 86  BP: 80/40 mmHg  _____________________________________________________________________________  __        Procedure  Limited Echo Adult. Poor quality two-dimensional was performed and  interpreted. Poor quality color and spectral Doppler were performed and  interpreted. Technically difficult study.  _____________________________________________________________________________  __        Interpretation Summary     Technically suboptimal, limited study.     1. Normal left ventricular size and systolic function. Estimated LVEF 60-65%.  2. No apparent wall motion abnormality.  3. Grossly normal right ventricular size and systolic function.  4. Atria and cardiac valves not well visualized. No hemodynamically  significant valve disease based on limited Doppler data.     There is no comparison study available.  _____________________________________________________________________________  __        Left Ventricle  The left ventricle is normal in size. There is normal left ventricular wall  thickness. Left ventricular systolic function is normal. The visual ejection  fraction is estimated at 60-65%. Left ventricular diastolic function is not  assessable. Regional wall motion abnormalities cannot be excluded due to  limited visualization.     Right Ventricle  The right ventricle is grossly normal size. Right ventricular function cannot  be assessed due to poor image quality.     Atria  The left atrium is not well visualized. Right atrium not well visualized.     Mitral Valve  The mitral valve is not well visualized. There is trace mitral regurgitation.        Tricuspid Valve  The tricuspid valve is not well visualized. There is mild to moderate  (1-2+)  tricuspid regurgitation. The right ventricular systolic pressure is  approximated at 31.4 mmHg plus the right atrial pressure.     Aortic Valve  The aortic valve is not well visualized. There is trace aortic regurgitation.  No hemodynamically significant valvular aortic stenosis.     Pulmonic Valve  The pulmonic valve is not well seen, but is grossly normal. There is trace  pulmonic valvular regurgitation. Normal pulmonic valve velocity.     Vessels  The aortic root is not well visualized. Inferior vena cava not well visualized  for estimation of right atrial pressure.     Pericardium  There is no pericardial effusion.        Rhythm  Sinus rhythm was noted.  _____________________________________________________________________________  __           Doppler Measurements & Calculations  TR max danielle: 280.3 cm/sec  TR max P.4 mmHg           _____________________________________________________________________________  __           Report approved by: Dr Vu Bhatia 2020 03:33 PM      XR Chest Port 1 View    Narrative    XR CHEST PORT 1 VW 2020 3:54 PM    HISTORY: central line check    COMPARISON: Chest x-ray 9/15/2020      Impression    IMPRESSION: Interval placement of a right IJ CVC with tip in the lower  SVC. No pneumothorax. Increased consolidation/atelectasis in the right  base. The left lung is relatively clear. Stable heart size.    VÍCTOR ELLIS MD

## 2020-09-17 NOTE — PLAN OF CARE
VSS, intermittent tachycardia, heart rhythm irregular. Alert and Orientated x3, unaware of time. Patient wanting to sleep/rest most of day. Patient reports feeling better today. Lungs are clear/diminished. Diet changed pureed with thickened liquids, no issues with swallowing - appetite poor. Bowels are active, denies nausea. Suprapubic catheter in place with adequate urine output. Excoriated buttocks and left upper thigh, foam dressing in place. Sore to right heel, scant drainage, mepliex in place. Scattered bruising throughout. Patient on bedrest, assist of 2 with lift. Continue to monitor patient and plan of care.

## 2020-09-17 NOTE — PLAN OF CARE
"  Problem: Nutrition Impaired (Sepsis/Septic Shock)  Goal: Optimal Nutrition Intake  Outcome: No Change     Problem: Adult Inpatient Plan of Care  Goal: Plan of Care Review  Outcome: Improving  Goal: Absence of Hospital-Acquired Illness or Injury  Outcome: Improving  Goal: Optimal Comfort and Wellbeing  Outcome: Improving  Goal: Readiness for Transition of Care  Outcome: Improving     Problem: Hemodynamic Instability (Sepsis/Septic Shock)  Goal: Effective Tissue Perfusion  Outcome: Improving     Problem: Infection (Sepsis/Septic Shock)  Goal: Absence of Infection Signs/Symptoms  Outcome: Improving     Problem: Respiratory Compromise (Sepsis/Septic Shock)  Goal: Effective Oxygenation and Ventilation  Outcome: Improving       Pt remains alert and somewhat oriented, patient is now responding appropriately , asking appropriate questions, and following simple commands at this time.   CVP and Art line remain in place with stable pressures and maps.   Telemetry in place and showing SR.  Lung sounds are clear throughout.   Oxygen saturations stable now on room air.   Blood sugar 69 this morning and new orders placed to support stable blood sugars.   NPO order remains in place.   Vitals stable.   /67   Pulse 74   Temp 97.2  F (36.2  C) (Axillary)   Resp 22   Ht 1.778 m (5' 10\")   Wt 100.7 kg (222 lb 0.1 oz)   SpO2 95%   BMI 31.85 kg/m    Urine output remains minimal.  Mepilex in place to coccyx and right Heel.   Will continue to monitor and promote comfort as care continues.   "

## 2020-09-17 NOTE — ANESTHESIA POSTPROCEDURE EVALUATION
Patient: Diallo Villarreal    * No procedures listed *    Diagnosis:* No pre-op diagnosis entered *  Diagnosis Additional Information: No value filed.    Anesthesia Type:  No value filed.    Note:  Anesthesia Post Evaluation    Patient location during evaluation: ICU  Patient participation: Unable to participate in evaluation secondary to age  Level of consciousness: lethargic  Pain management: adequate  Airway patency: patent  Cardiovascular status: acceptable and hemodynamically stable  Respiratory status: acceptable, room air and nonlabored ventilation  Hydration status: stable  PONV: none     Anesthetic complications: None    Comments: Patient was happy with the anesthesia care received and no anesthesia related complications were noted.  I will follow up with the patient again if it is needed.        Last vitals:  Vitals:    09/17/20 0700 09/17/20 0800 09/17/20 1200   BP: 132/86 104/61    Pulse: 100 78    Resp: 23 18    Temp:  97.5  F (36.4  C) 98  F (36.7  C)   SpO2: 94% 92%          Electronically Signed By: ROCÍO Lopez CRNA  September 17, 2020  2:45 PM

## 2020-09-17 NOTE — PROVIDER NOTIFICATION
DATE:  9/17/2020   TIME OF RECEIPT FROM LAB:  1035  LAB TEST:  Blood culture  LAB VALUE:  Growing a third organism, probable pseudomonas auruginosa  RESULTS GIVEN WITH READ-BACK TO (PROVIDER):  Dr. Manning  TIME LAB VALUE REPORTED TO PROVIDER:   1031

## 2020-09-18 PROBLEM — G93.40 ENCEPHALOPATHY: Status: ACTIVE | Noted: 2020-01-01

## 2020-09-18 PROBLEM — R78.81 BACTEREMIA: Status: ACTIVE | Noted: 2020-01-01

## 2020-09-18 PROBLEM — A41.9 SEPSIS (H): Status: RESOLVED | Noted: 2020-01-01 | Resolved: 2020-01-01

## 2020-09-18 PROBLEM — A41.9 SEPTIC SHOCK (H): Status: ACTIVE | Noted: 2020-01-01

## 2020-09-18 PROBLEM — R65.21 SEPTIC SHOCK (H): Status: ACTIVE | Noted: 2020-01-01

## 2020-09-18 PROBLEM — G93.40 ACUTE ENCEPHALOPATHY: Status: ACTIVE | Noted: 2019-03-02

## 2020-09-18 NOTE — PLAN OF CARE
Discharge Planner SLP   Patient plan for discharge: Return to [facility]    Current status: Patient seen by speech pathology to re-assess safety with oral intake. Per chart review and nursing, patient is tolerating dysphagia diet of nectar liquids and puree solids well. Patient's daughter present for today's session. SLP obtained further information re: patient's baseline diet of regular solids and thin liquids. Patient did not wear dentures for today's session as only the top plate was present.  Patient consumed 3 tbs puree solids WFL. He was presented with soft sold ( bite size piece of brownie) x3 with prolonged mastication, minimal residue and no pharyngeal difficulties. Patient presented with thin liquid by straw (<2oz) x5. No oral loss this date, no overt s/sx of aspiration/penetration. Patient benefited from external pacing strategies (pinching straw) to control bolus size. Patient had difficulty maintaining midline posture this date.     Recommend:  1.)Thin liquids (okay to use straw with external pacing strategies, I.e. pinching straw if patient is unable to follow directions for small sips)  2.)NDD2 solids  3.)Alternate liquids and solids  4.) Upright, midline posture with PO intake  5.) Pills crushed or placed in puree carrier  6.) Oral cares      Barriers to return to prior living situation: clinical status    Recommendations for discharge: pacing with oral intake, oral cares, upright posture with PO intake    Rationale for recommendations: To increase safety with oral intake.       Entered by: Kami Bliss 09/18/2020 12:30 PM

## 2020-09-18 NOTE — PROVIDER NOTIFICATION
Patient has had several episodes of tachycardia tonight as high as the 170's. He normally takes Metoprolol 50mg BID but has not been getting it here due to initial hypotension. Blood pressures have been WNL for > than 24 hours. MD notified and will give Metoprolol 25mg now and resume normal home dosing beginning in the morning. EKG to be completed as well.

## 2020-09-18 NOTE — PROGRESS NOTES
Sepsis Evaluation Progress Note    I was called to see Diallo Villarreal due to abnormal vital signs triggering the Sepsis SIRS screening alert. He is known to have an infection.     Physical Exam   Vital Signs:  Temp: 98  F (36.7  C) Temp src: Oral BP: (!) 143/98 Pulse: 100   Resp: 19 SpO2: 94 % O2 Device: None (Room air)       General: chronically ill appearing  Mental Status: patient was sleeping when I evaluated him, but awoke after sternal rub. He informed me that he was tired as asked if he could go back to sleep.    Remainder of physical exam is significant for no supplemental oxygen    Data   Lactic Acid   Date Value Ref Range Status   09/17/2020 1.4 0.7 - 2.0 mmol/L Final   09/16/2020 2.5 (H) 0.7 - 2.0 mmol/L Final     Comment:     Significant value called to and read back by  MARTHA LEON IN MED SURG AT 0558 ON 09.16.2020 CK       Lactate for Sepsis Protocol   Date Value Ref Range Status   09/18/2020 2.4 (H) 0.7 - 2.0 mmol/L Final     Comment:     Significant value called to and read back by  DONNA RIOJAS RN IN ICU AT 0617 ON 09/18/20 DC     09/16/2020 2.2 (H) 0.7 - 2.0 mmol/L Final     Comment:     Significant value called to and read back by  JUVE SERRANO RN IN MED SURG ON 1412 ON 9/16/20 DC         Assessment & Plan   Diallo Villarreal meets SIRS criteria but does NOT have a lactate >2 or other evidence of acute organ damage.  These vital sign, lab and physical exam findings are consistent with SEPSIS.    Sepsis Time-Zero 9/15/2020, 8:03PM    Anti-infectives (From now, onward)    Start     Dose/Rate Route Frequency Ordered Stop    09/16/20 1112  vancomycin place power - receiving intermittent dosing      1 each Intravenous SEE ADMIN INSTRUCTIONS 09/16/20 1114      09/16/20 0100  piperacillin-tazobactam (ZOSYN) infusion 3.375 g      3.375 g  over 1 Hours Intravenous EVERY 6 HOURS 09/15/20 7222          Current antibiotic coverage is appropriate for source of infection.     Disposition: The  patient will remain on the current unit. We will continue to monitor this patient closely.. I have also given him a 500 ml bolus of NS with a lactate recheck in 2 hours.    Eladio Bond MD    Sepsis Criteria   Sepsis: 2+ SIRS criteria due to infection  Severe Sepsis: Sepsis AND 1+ new sign of acute organ dysfunction (Note: lactate >2 or acute encephalopathy each qualify as organ dysfunction)  Septic Shock: Sepsis AND hypotension despite volume resuscitation with 30 ml/kg crystalloid or lactate >=4  Note: HYPOTENSION is defined as 2 BP readings measured 3 hrs apart that have a SBP <90, MAP <65, or decrease >40 mmHg, occurring 6 hrs before or after t-zero

## 2020-09-18 NOTE — PROGRESS NOTES
Antimicrobial Stewardship Team Note     Antimicrobial Stewardship Program - A joint venture between [facility] and  Physicians to optimize antibiotic management.  NOT a formal consult - Restricted Antimicrobial Review      Patient: Diallo Villarreal  MRN: [id number]  Allergies: Gluten meal and Wheat extract    Brief Summary: Diallo Villarreal is an 88 year old male with PMHx significant for cerebral palsy who resides in a long-term care facility, history of recurrent UTI in the setting of a suprapubic catheter who was admitted on 9/15/2020 with altered mental status.     History of Present Illness: Nursing home staff reported patient to be more lethargic. EMS was called in response to patient having seizure-like activity. On arrival to the ED, patient was febrile (Tmax 101.8) , tachycardic (HR 100s), normotensive, tachynpeic (RR 24-26), on 2L NC. Labs showed a leukocytosis (WBC 16.8, then trended up to 26.9 with neutrophil predominance), lactic acidosis (6.3), .0, RAJANI (creatinine 1.82, baseline ~1 mg/dL), and hyponatremia (Na 129). Patient was ill-appearing, unresponsive to voice or physical stimuli, making non-purposeful movements, moaning, and occasional coarse wet cough. Urine from suprapubic catheter with sediment and somewhat cloudy in appearance. Breathing was described as coarse with cough and rales in left lung base. 9/15 CXR questioned minimal atelectasis and/or infiltrate at the left lung base, left lung otherwise clear. COVID pcr resulted negative. 9/15 head CT with ovoid hyperdense parenchymal lesion involving the right frontal pole, most concerning for small acute parenchymal hemorrhage. Patient was started on empiric Zosyn and vancomycin. 9/15 peripheral blood cultures with proteus mirabilis (2/2 bottles), Pseudomonas aeruginosa (1/2 bottles), and Enterococcus faecalis (1/2 bottles). Repeat blood cultures remain no growth to date. Urine culture finalized with polymicrobial growth,  including two strains of non lactose fermenting GNRs, three strains GPCs. Previous urine culture history with Pseudomonas aeruginosa (10/2019, fluoroquinolone resistant) and Klebsiella pneumoniae, Proteus mirabilis, MRSA, Enterococcus faecalis, and Pseudomonas aeruginsa (9/2019). TTE with normal LV size and function (EF 60-65%) with no apparent wall motion abnormalities.          Active Anti-infective Medications   (From admission, onward)                 Start     Stop    09/16/20 0100  piperacillin-tazobactam  3.375 g,   Intravenous,   EVERY 6 HOURS     Sepsis        --                  Assessment:Urosepsis complicated by polymicrobial bacteremia  The most likely source of patient's bacteremia is urinary in the setting of a chronic suprapubic catheter. A respiratory source seems less likely at this point with inconclusive imaging and patient requiring minimal oxygen supplementation, now on room air. Patient's altered mental status on presentation is concerning for a potential CNS infection, but CT without evidence of other acute intracranial findings suggestive of possible infection. It is unclear if patient had a seizure or if activity was more related to rigors in the setting of septic shock. Patient has not required pressor support for the past 24 hours and appears vitally stable. However, leukocytosis persists and continues to have elevated lactate levels, signaling possibility of lack of source control. Recommend obtaining a renal US to rule hydronephrosis and/or obstruction. Suprapubic catheter was exchanged on 9/16. Given clinical stability and no growth on repeat blood cultures, agree with Zosyn for now pending sensitivity data. Recommend stopping vancomycin - patient's last dose was on 9/16 and no resistant markers (Skip/B negative) per Verigene for E.faecalis. Anticipate patient will require IV antibiotic therapy on discharge as most recent Pseudomonas aeruginosa isolate in urine (10/2019) was  fluoroquinolone resistant. Would also want to appropriately target Enterococcus faecalis, which would require IV therapy to reach adequate concentrations in the blood. If patient were to decompensate, please consult ID advice line and add tobramycin IV while sensitivities results for double Pseudomonas coverage.     Recommendations:  1.) Please obtain renal US to rule out hydronephrosis/obstruction with persistent leukocytosis and lactic acidosis  2.) Stop vancomycin, agree with Zosyn - anticipate patient requiring IV antibiotic therapy on discharge (see rationale above)  3.) If patient were to decompensate while sensitivities result, recommend ID Advice Line and IV tobramycin.    Discussed with ID Provider: MD Raquel Costa, PharmD, BCIDP  Pager: [phone]      Vital Signs/Clinical Features:  Vitals         09/16 0700  -  09/17 0659 09/17 0700  -  09/18 0659 09/18 0700  -  09/18 1136   Most Recent    Temp ( F) 96.8 -  99.2    97.5 -  98.2      98.4     98.4 (36.9)    Pulse 61 -  95    74 -  125    89 -  103     89    Resp 9 -  33    12 -  23    11 -  18     11    BP 70/30 -  138/85    102/62 -  143/116    139/63 -  140/97     140/97    SpO2 (%) 83 -  100    91 -  99      93     93            Labs  Estimated Creatinine Clearance: 48.4 mL/min (based on SCr of 1.25 mg/dL).  Recent Labs   Lab Test 07/13/20  0713 09/15/20  1811 09/16/20  0011 09/16/20  0534 09/17/20  0550 09/18/20  0603   CR 0.92 1.82* 1.87* 2.13* 1.69* 1.25       Recent Labs   Lab Test 07/22/19  0806 10/01/19  1340 10/16/19  0832 11/15/19  0720 09/15/20  1811 09/16/20  0534 09/17/20  0550 09/18/20  0603   WBC 9.4 7.3 7.3 6.4 16.8* 26.9* 24.0* 22.3*   ANEU 6.4 4.5 4.3  --  15.3* 21.5* 20.3*  --    ALYM 1.4 1.6 1.2  --  0.4* 1.3 1.0  --    FRANSISCA 1.0 0.7 1.0  --  0.9 3.2* 1.5*  --    AEOS  --   --   --   --   --  0.3  --   --    HGB 7.6* 9.8* 9.8* 9.6* 9.7* 8.9* 9.0* 9.9*   HCT 24.8* 30.3* 30.1* 30.2* 29.4* 27.4* 26.9* 30.3*   MCV  84 87 87 87 90 91 89 89    229 198 236 184 134* 133* 133*       Recent Labs   Lab Test 09/15/20  1811 09/16/20  0534   BILITOTAL 2.0* 1.9*   ALKPHOS 83 58   ALBUMIN 2.8* 2.2*   AST 21 28   ALT 13 14       Recent Labs   Lab Test 09/15/20  1811 09/15/20  2102 09/16/20  0534 09/17/20  0550 09/18/20  0900   LACT 6.3* 3.4* 2.5* 1.4 2.2*   .0*  --   --   --   --              Culture Results:  7-Day Micro Results       Procedure Component Value Units Date/Time    Blood culture [[id number]] Collected:  09/18/20 0935    Order Status:  Completed Lab Status:  In process Updated:  09/18/20 0940    Specimen:  Blood     Blood culture [[id number]] Collected:  09/18/20 0900    Order Status:  Completed Lab Status:  In process Updated:  09/18/20 0903    Specimen:  Blood     Blood culture [[id number]] Collected:  09/16/20 0701    Order Status:  Completed Lab Status:  Preliminary result Updated:  09/18/20 0405    Specimen:  Blood      Specimen Description Blood Right Hand     Special Requests Received in aerobic bottle only     Culture Micro No growth after 2 days    Blood culture [[id number]] Collected:  09/16/20 0700    Order Status:  Completed Lab Status:  Preliminary result Updated:  09/18/20 0405    Specimen:  Blood      Specimen Description Blood Left Hand     Special Requests Received in aerobic bottle only     Culture Micro No growth after 2 days    Blood culture [[id number]]  (Abnormal) Collected:  09/15/20 1846    Order Status:  Completed Lab Status:  Preliminary result Updated:  09/18/20 0822    Specimen:  Blood      Specimen Description Blood Right Arm     Culture Micro Cultured on the 1st day of incubation:  Proteus mirabilis  Susceptibility testing in progress        Critical Value/Significant Value, preliminary result only, called to and read back by  Eleonora Kimball, MARTA @ 0634 9/16/20 TM.        Cultured on the 1st day of incubation:  Enterococcus faecalis  Susceptibility testing in progress        Critical  Value/Significant Value, preliminary result only, called to and read back by  JUVE SERRANO RN @1037 9.16.20 LM        Cultured on the 1st day of incubation:  Pseudomonas aeruginosa  Susceptibility testing in progress        Critical Value/Significant Value, preliminary result only, called to and read back by  Comfort Hilliard RN @ 1035. 09/17/20        (Note)  POSITIVE for PROTEUS SPECIES by Verigene multiplex nucleic acid test.  Final identification and antimicrobial susceptibility testing will be  verified by standard methods.    Specimen tested with Verigene multiplex, gram-negative blood culture  nucleic acid test for the following targets: Acinetobacter sp.,  Citrobacter sp., Enterobacter sp., Proteus sp., E. coli, K.  pneumoniae/oxytoca, P. aeruginosa, and the following resistance  markers: CTXM, KPC, NDM, VIM, IMP and OXA.    Critical Value/Significant Value called to and read back by Marcos Kinsey RN @ 0850. 09/16/20    POSITIVE for ENTEROCOCCUS FAECALIS and NEGATIVE for Skip/vanB genes  by Verigene multiplex nucleic acid test. Final identification and  antimicrobial susceptibility testing will be verified by standard  methods.    Specimen tested with Verigene multiplex, gram-positive blood culture  nucleic acid test for the following targets: Staph aureus, Staph  epidermidis, Staph lugdunensis, other Staph species, Entero  coccus  faecalis, Enterococcus faecium, Streptococcus species, S. agalactiae,  S. anginosus grp., S. pneumoniae, S. pyogenes, Listeria sp., mecA  (methicillin resistance) and Skip/B (vancomycin resistance).    Critical Value/Significant Value called to and read back by Marcos Kinsey RN @1331 9/16/2020 gd      Urine Culture [[id number]]  (Abnormal) Collected:  09/15/20 1811    Order Status:  Completed Lab Status:  Final result Updated:  09/17/20 2952    Specimen:  Catheterized Urine      Specimen Description Catheterized Urine Suprapubic     Culture Micro 50,000 to 100,000 colonies/mL  Non  lactose fermenting gram negative rods        10,000 to 50,000 colonies/mL  Gram positive cocci        10,000 to 50,000 colonies/mL  Strain 2  Gram positive cocci        10,000 to 50,000 colonies/mL  Strain 2  Non lactose fermenting gram negative rods        10,000 to 50,000 colonies/mL  Strain 3  Gram positive cocci        Multiple morphotypes present with no predominant organism.  Growth consistent with   probable contamination during collection.  Suggest repeat specimen if clinically   indicated.      Blood culture [[id number]]  (Abnormal) Collected:  09/15/20 1811    Order Status:  Completed Lab Status:  Preliminary result Updated:  09/17/20 1226    Specimen:  Blood      Specimen Description Blood Left Arm     Culture Micro Cultured on the 1st day of incubation:  Proteus mirabilis  Susceptibility testing done on previous specimen        Critical Value/Significant Value, preliminary result only, called to and read back by  JUVE SERRANO RN @1127 9.16.20               Recent Labs   Lab Test 04/30/19  1100 06/24/19  0315 12/24/19  1630 09/15/20  2046   URINEPH 8.0* 8.0* 6.0 7.0   NITRITE Negative Negative Negative Negative   LEUKEST Large* Large* Large* Large*   WBCU >182* 77* >182* 10 TO 25                         Imaging: Xr Chest Port 1 View    Result Date: 9/16/2020  XR CHEST PORT 1 VW 9/16/2020 3:54 PM HISTORY: central line check COMPARISON: Chest x-ray 9/15/2020     IMPRESSION: Interval placement of a right IJ CVC with tip in the lower SVC. No pneumothorax. Increased consolidation/atelectasis in the right base. The left lung is relatively clear. Stable heart size. VÍCTOR ELLIS MD    Xr Chest Port 1 View    Result Date: 9/15/2020  CHEST ONE VIEW  9/15/2020 7:36 PM HISTORY: Sepsis. COMPARISON: None.     IMPRESSION: Elevated right hemidiaphragm. Question some minimal atelectasis and/or infiltrate at the left base, although this is not certain. Left lung clear. ALEXANDRA RODGERS MD    Echocardiogram Limited    Result  Date: 2020  [id number]^A    [facility] [address] [place] [address]   Name: ARNU ANDRE MRN: [id number] : 1932 Study Date: 2020 01:09 PM Age: 88 yrs Gender: Male Patient Location: [facility] Reason For Study: Other, Please Specify in Comments History: COPD, HTN Ordering Physician: JAS KENDALL Referring Physician: Jennifer Bryant Performed By: Dorian Murphy  BSA: 2.1 m2 Height: 70 in Weight: 201 lb HR: 86 BP: 80/40 mmHg _____________________________________________________________________________ __   Procedure Limited Echo Adult. Poor quality two-dimensional was performed and interpreted. Poor quality color and spectral Doppler were performed and interpreted. Technically difficult study. _____________________________________________________________________________ __   Interpretation Summary  Technically suboptimal, limited study.  1. Normal left ventricular size and systolic function. Estimated LVEF 60-65%. 2. No apparent wall motion abnormality. 3. Grossly normal right ventricular size and systolic function. 4. Atria and cardiac valves not well visualized. No hemodynamically significant valve disease based on limited Doppler data.  There is no comparison study available. _____________________________________________________________________________ __   Left Ventricle The left ventricle is normal in size. There is normal left ventricular wall thickness. Left ventricular systolic function is normal. The visual ejection fraction is estimated at 60-65%. Left ventricular diastolic function is not assessable. Regional wall motion abnormalities cannot be excluded due to limited visualization.  Right Ventricle The right ventricle is grossly normal size. Right ventricular function cannot be assessed due to poor image quality.  Atria The left atrium is not well visualized. Right atrium not well  visualized.  Mitral Valve The mitral valve is not well visualized. There is trace mitral regurgitation.   Tricuspid Valve The tricuspid valve is not well visualized. There is mild to moderate (1-2+) tricuspid regurgitation. The right ventricular systolic pressure is approximated at 31.4 mmHg plus the right atrial pressure.  Aortic Valve The aortic valve is not well visualized. There is trace aortic regurgitation. No hemodynamically significant valvular aortic stenosis.  Pulmonic Valve The pulmonic valve is not well seen, but is grossly normal. There is trace pulmonic valvular regurgitation. Normal pulmonic valve velocity.  Vessels The aortic root is not well visualized. Inferior vena cava not well visualized for estimation of right atrial pressure.  Pericardium There is no pericardial effusion.   Rhythm Sinus rhythm was noted. _____________________________________________________________________________ __    Doppler Measurements & Calculations TR max danielle: 280.3 cm/sec TR max P.4 mmHg    _____________________________________________________________________________ __    Report approved by: Dr Vu Bhatia 2020 03:33 PM      Ct Head W/o Contrast    Result Date: 9/15/2020  CT SCAN OF THE HEAD WITHOUT CONTRAST   9/15/2020 8:04 PM HISTORY: Seizure, new, nontraumatic, greater than 40 years TECHNIQUE:  Axial images of the head and coronal reformations without IV contrast material. Radiation dose for this scan was reduced using automated exposure control, adjustment of the mA and/or kV according to patient size, or iterative reconstruction technique. COMPARISON: None. FINDINGS: There is an ovoid hyperdense focus involving the cortex of the right frontal pole (series 5 image 18) most concerning for small acute parenchymal hemorrhage. Other considerations such as a hyperdense mass or slow-flow vascular malformation/cavernoma cannot be completely excluded. This lesion measures approximately 9 mm x 6 mm x 9 mm.  There is encephalomalacia in the left paraventricular region, primarily adjacent to the body and atrium of the left lateral ventricle, with ex vacuo dilatation of the left lateral ventricle. No evidence for hydrocephalus. The ventricles are otherwise normal in size and configuration. Moderate generalized brain parenchymal volume loss and mild to moderate scattered hypoattenuation in the periventricular and deep cerebral white matter likely representing chronic small vessel ischemic disease. No extra-axial fluid collection or significant mass effect/midline shift. Scattered atherosclerotic disease primarily involving the carotid siphons. The visualized aspects of the paranasal sinuses and mastoids are clear. Bilateral lens replacements. The calvarium and skull base are grossly intact.     IMPRESSION: 1. Ovoid hyperdense parenchymal lesion involving the right frontal pole, most concerning for small acute parenchymal hemorrhage. Given the absence of a history of trauma, spontaneous parenchymal hemorrhage in the setting of cerebral amyloid angiopathy must be considered. Other considerations such as a hyperdense mass or slow-flow vascular malformation such as a cavernoma are not completely excluded. Recommend MRI without and with contrast for further characterization. 2. No other definite acute intracranial findings. 3. Left periventricular encephalomalacia with ex vacuo dilatation of the left lateral ventricle. 4. Generalized brain parenchymal volume loss and presumed chronic small vessel ischemic changes. [Critical Result: Findings concerning for acute intracranial hemorrhage] Finding was identified on 9/15/2020 8:09 PM. Dr. Gavin was contacted by Dr. Justin on 9/15/2020 8:20 PM and verbalized understanding of the critical result. ALEXANDRA JUSTIN MD

## 2020-09-18 NOTE — PROGRESS NOTES
"Writer was able to obtain patient's cognitive and physical functioning baseline from Afia, the nurse manager at [facility].      Afia stated that patient's most recent BIM cognitive score is 13/15- meaning mild cognitive impairment. Afia reported that patient \"can tell you what he needs\".      He is assist of one with transfers. He can walk 50 feet with a rolling walker, gait belt, and assist of one staff person.  He needs to have his \"special shoes\" on to transfer well and to walk.      He is continent of bowel and usually has one bowel movement per day. Has long term mock catheter.      He eats on his own. Is one assist for dressing and grooming.     He is 1-2 assist with bed mobility due to his right sided weakness.  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     He is not on oxygen at [place].  He has upper and lower dentures.      Discussed that patient is still in the ICU. Anticipate possible discharge on Monday.  Patient has [employer] insurance, so CTS will need to call [employer] for wheelchair transport.    "

## 2020-09-18 NOTE — PLAN OF CARE
Patient has been awake and very restless throughout the night continuously pulling at cords and lines. He did pull out his central line. He reports generalized discomfort but refuses to take Tylenol. He is disoriented to time and place thinking he is at physical therapy this morning. Blood  pressure has been WNL. Heart rate has ranged from the 80's-170's for brief periods. Lopressor was restarted during the night with improvement of rate to the 80's-90's. Rhythm has been very irregular appearing to be SR-ST, 1st deg, BBB at times to a fib, BBB or SD with pvc's. EKG completed and shows possible a fib. Suprapubic catheter in place with 400+ml out.

## 2020-09-18 NOTE — PLAN OF CARE
Major Shift Events:  Patient agitated this shift. PRN Seroquel and zyprexa IM administered per MAR.   Neuro: Oriented to self only. Restless and agitated  Resp. LS diminished on room air.   CV: patient HR fluctuating from SR to Afib. VSS. Afebrile   GI: Speech eval this shift. Advanced to [facility] soft diet with thin liquids.   : Chronic supra pubic cath.     Plan: Continue POC.   For vital signs and complete assessments, please see documentation flowsheets.     Arianne Justice RN

## 2020-09-18 NOTE — PROGRESS NOTES
DATE:  9/18/2020   TIME OF RECEIPT FROM LAB:  622pm  LAB TEST:  Lactic  LAB VALUE:  3.2  RESULTS GIVEN WITH READ-BACK TO (PROVIDER):  Silver  TIME LAB VALUE REPORTED TO PROVIDER:   622pm

## 2020-09-18 NOTE — PROVIDER NOTIFICATION
S-Provider Notify  B-Sepsis  A-BP consistently high for last several hours, 147-181/.    R-PRN and scheduled metoprolol orders received.  Marcos Kinsey RN

## 2020-09-18 NOTE — PROGRESS NOTES
[facility]    Medicine Progress Note - Hospitalist Service       Date of Admission:  9/15/2020  Assessment & Plan    Patient is an 88-year-old gentleman with past medical history of cerebral palsy who resides at a long-term care facility, history of recurrent urinary tract infections with suprapubic catheter who presented with altered mental status as well as concern for seizure-like activity on the day of presentation.  In the emergency room he was found to have sepsis which developed into septic shock and he was admitted to the intensive care unit for vasopressor support.  Urosepsis is now strongly suspected with blood cultures from 9/15/2020 growing out Proteus, Enterococcus, and with Pseudomonas.  Blood cultures from 9/16 and 9/17 have shown no growth.  Patient has been treated with vancomycin and Zosyn and has been weaned off vasopressor support for over 36 hours and is now hypertensive but with ongoing mild persistently elevation in lactic acid.  Patient flagged for a lactic of 2.4 this morning and did have a fluid bolus with blood pressure slightly improving to 2.2.  Hospital course has further been complicated by the development of delirium with patient having fluctuating levels of consciousness and some restlessness as well as periods of agitation and aggression and periods of increased somnolence noted since last evening.  There also concern for possible hyperdense lesion on the brain concerning for a bleed on initial CT scan however patient has not been stable enough to perform the recommended MRI.  Infectious disease has recommended discontinuation of vancomycin given the organisms identified with continuation of Zosyn alone and consideration of tobramycin addition for dual coverage of Pseudomonas if patient does start to clinically worsen going forward.  We will also obtain renal ultrasound to evaluate for any hydronephrosis or obstruction that could be causing a  persistent source of infection.  Given his delirium and agitation will continue with ICU management and will initiate delirium protocol.  Would consider repeat CT scan of the head patient does start clinically worsening going forward.    Principal Problem:    Septic shock (H)    Assessment: Presenting as above, with blood pressures now stabilizing and becoming hypertensive, white blood cell count decreasing and heart rate 20 downward.  Lactic acid continues to be mildly elevated but has improved following fluid resuscitation.  Blood cultures have identified Proteus, enterococcus, and Pseudomonas as outlined above which is the suspected etiology    Plan: Continue with Zosyn with discontinuation of vancomycin as recommended by the ID antibiotic stewardship committee and will proceed with renal ultrasound to evaluate for any hydronephrosis or obstruction.  We will recheck lactic acid this afternoon to ensure ongoing stability to improvement going forward.  Continue close monitoring for ongoing improvement.    Active Problems:    Bacteremia    Assessment: Blood cultures growing out Pseudomonas, enterococcus, and Proteus as above    Plan: Proceed with treatment as above      Complicated UTI (urinary tract infection)    Assessment: Urine culture is growing out numerous organisms, strongly suspected to be the etiology for bacteremia and sepsis as above    Plan: Continue with treatment as outlined above      Encephalopathy/Altered mental status    Assessment: Present initially and was suspected to be a metabolic encephalopathy secondary to patient's underlying cerebral palsy with acute sepsis, suspected urinary tract infection, acute kidney injury, hyponatremia, and abnormal CT head findings.  Patient did seem to have some clinical improvement yesterday but has now been having fluctuating levels of consciousness with some associated restlessness and intermittent agitation but also has periods of clearing consistent with  delirium    Plan: We will check electrolytes to make sure no significant abnormalities are present, correct mild hypokalemia that is noted.  Will  initiate delirium protocol head consider CT scan of the head for repeat evaluation if patient could come down enough to allow exam to occur.      Acute kidney injury (H)    Assessment: Secondary to sepsis, peak at 2.13 with creatinine trending down subsequently and this morning is 1.25, previous baseline appears to be 0.9    Plan: Continue with IV fluid resuscitation, recheck creatinine tomorrow to ensure ongoing improvement      Seizure-like activity (H)    Assessment: Occurring prior to this hospitalization at [facility] environment, no seizure-like activity has been noted during the stay.  Patient does have a possible hyperdense lesion on the brain concerning for a bleed but have been unable to proceed with the MRI recommended due to ongoing severity of illness and agitation not allowing MRI to be performed.    Plan: Continue to monitor for any signs of seizure-like activity, consider proceeding with repeat CT scan if patient were to clinically worsen.  Will obtain MRI when medically stable      Hyponatremia    Assessment: Present initially, resolved with fluid resuscitation    Plan: Continue to monitor for ongoing resolution      Cerebral palsy (H)    Assessment: Present at baseline    Plan: No acute intervention is needed, continue supportive cares.       Diet: Dysphagia Diet Level 1 Pureed Nectar Thickened Liquids (pre-thickened or use instant food thickener)    DVT Prophylaxis: Pneumatic Compression Devices  Armendariz Catheter: not present  Code Status: DNR/DNI         Disposition Plan   Expected discharge: 4 - 7 days, recommended to prior living arrangement once SIRS/Sepsis treated and mentation has stabilized, appropriate antibitoic plan in place.  Entered: Santa Villegas MD 09/18/2020, 12:08 PM       The patient's care was discussed with  the Bedside Nurse, Care Coordinator/, Patient and Patient's Family.    Santa Villegas MD  Hospitalist Service  [facility]    ______________________________________________________________________    Interval History   Patient remains afebrile, heart rate had been a mild sinus tachycardia earlier this morning but has improved following fluid resuscitation and is currently in the 80s, blood pressures are now slightly hypertensive, patient remains saturating well on room air.  Patient currently is oriented only to self, does not recognize his daughter who is in the room with him whereas he had earlier, currently very restless and attempting to pick at his close and lines.  He did pull out his IJ last evening during a period of restlessness and now has mitts on his hands bilaterally.    Data reviewed today: I reviewed all medications, new labs and imaging results over the last 24 hours.    Physical Exam   Vital Signs: Temp: 98.4  F (36.9  C) Temp src: Oral BP: (!) 140/97 Pulse: 89   Resp: 11 SpO2: 93 % O2 Device: None (Room air)    Weight: 220 lbs 3.83 oz  Constitutional: awake, alert, but restless and only makes fleeting eye contact, is unable to follow commands no apparent distress, and appears stated age  Respiratory: No increased work of breathing, decreased breath sounds diffusely but patient is not able to follow commands, no wheezing or crackles identified  Cardiovascular: Regular rate and rhythm with heart rate in the 80s  GI: Bowel sounds present, abdomen soft and nontender  Skin: no redness, warmth, or swelling and no rashes  Musculoskeletal: no lower extremity pitting edema present  Neurologic: Awake, alert, answers yes and no questions currently but not always appropriate for questions asked    Data   Recent Labs   Lab 09/18/20  1359 09/18/20  0603 09/17/20  0550  09/16/20  0534  09/15/20  1811   WBC  --  22.3* 24.0*  --  26.9*  --  16.8*   HGB  --   9.9* 9.0*  --  8.9*  --  9.7*   MCV  --  89 89  --  91  --  90   PLT  --  133* 133*  --  134*  --  184   NA  --  143 140  --  135   < > 129*   POTASSIUM 4.0 3.3* 3.5   < > 3.4   < > 3.9   CHLORIDE  --  112* 110*  --  105   < > 95   CO2  --  21 22  --  22   < > 22   BUN  --  21 29  --  25   < > 21   CR  --  1.25 1.69*  --  2.13*   < > 1.82*   ANIONGAP  --  10 8  --  8   < > 12   MARIN  --  8.6 8.3*  --  7.9*   < > 8.7   GLC  --  165* 103*  --  88   < > 125*   ALBUMIN  --   --   --   --  2.2*  --  2.8*   PROTTOTAL  --   --   --   --  5.2*  --  6.2*   BILITOTAL  --   --   --   --  1.9*  --  2.0*   ALKPHOS  --   --   --   --  58  --  83   ALT  --   --   --   --  14  --  13   AST  --   --   --   --  28  --  21    < > = values in this interval not displayed.

## 2020-09-18 NOTE — PROGRESS NOTES
DATE: 9/18/2020    TIME OF RECEIPT FROM LAB:  0615  LAB TEST:  Lactic  LAB VALUE:  2.4  RESULTS GIVEN WITH READ-BACK TO (PROVIDER):  Dr. Bond  TIME LAB VALUE REPORTED TO PROVIDER:   0618  NEW ORDERS: Awaiting

## 2020-09-19 NOTE — PROGRESS NOTES
[facility]    Medicine Progress Note - Hospitalist Service       Date of Admission:  9/15/2020  Assessment & Plan     Patient is an 88-year-old gentleman with past medical history of cerebral palsy who resides at a long-term care facility, history of recurrent urinary tract infections with suprapubic catheter who presented with altered mental status as well as concern for seizure-like activity on the day of presentation.  In the emergency room he was found to have sepsis which developed into septic shock and he was admitted to the intensive care unit for vasopressor support.  Urosepsis is now strongly suspected with blood cultures from 9/15/2020 growing out Proteus, Enterococcus, and with Pseudomonas.  There was also concern for possible right lower lobe pneumonia and concern for aspiration.  Patient has been treated with vancomycin and Zosyn and has been weaned off vasopressor support with Blood cultures from 9/16 and 9/17 showing no growth, WBC decreasing, procalcitonin trending downward but with ongoing mild persistently elevation in lactic acid and increasing encephalopathy with fluctuating levels of consciousness.  Patient was narrowed to Zosyn antibiotic coverage alone on 9/18/2020 per antibiotic stewardship committee recommendations with overall improvement apart from ongoing confusion and elevated lactic acid.  On initial CT scan in the ED there was a hyperdense lesion in the right frontal lobe of the brain concerning for a bleed on initial CT scan however patient has not been stable/cooperative enough to perform the recommended MRI.  This morning patient appears more cooperative - it is not felt that he would be able to stay still for an MRI, but given his ongoing cognitive changes will proceed with repeat CT scan of the head for re-evaluation of hyperdense lesion.      Principal Problem:    Septic shock (H)    Assessment: Presenting as above, with blood pressures now  stabilizing and becoming hypertensive, white blood cell count decreasing and heart rate 20 downward.  Lactic acid continues to be mildly elevated despite ongoing fluid resuscitation.  Blood cultures have identified Proteus, enterococcus, and Pseudomonas as outlined above which is the suspected etiology.  Does also have concern for aspiration pneumonia.  Started on Zosyn and vancomycin initially but narrowed to Zosyn alone on 9/18/2020 as above.  White blood cell count and procalcitonin continue to trend downward.  Ultrasound was difficult to obtain given patient's inability to stay still yesterday but no obvious evidence of hydronephrosis or obstruction identified    Plan: Continue with Zosyn for antibiotic coverage, monitor for ongoing clinical improvement, proceed with repeat CT scan of the brain as outlined above.  We will recheck CBC, CMP, and procalcitonin tomorrow morning for further evaluation    Active Problems:    Bacteremia    Assessment: Blood cultures growing out Pseudomonas, enterococcus, and Proteus as above    Plan: Proceed with treatment as above -we will need to discuss with infectious disease treatment options going forward once final sensitivities return      Complicated UTI (urinary tract infection)    Assessment: Urine culture is growing out numerous organisms, strongly suspected to be the etiology for bacteremia and sepsis as above    Plan: Continue with treatment as outlined above      Encephalopathy/Altered mental status    Assessment: Present initially and was suspected to be a metabolic encephalopathy secondary to patient's underlying cerebral palsy with acute sepsis, suspected urinary tract infection, acute kidney injury, hyponatremia, and abnormal CT head findings.  Patient did seem to have some clinical improvement following initial resuscitation efforts but has now been having fluctuating levels of consciousness with some associated restlessness and intermittent agitation but also has  periods of clearing consistent with delirium but given his hyperdense lesion may also be worsening head bleed.      Plan: Given his current level of cooperation, will proceed with CT scan this morning for further evaluation.  Continue with treatment of acute infection as well as delirium protocol as needed.      Acute kidney injury (H)    Assessment: Secondary to sepsis, peak at 2.13 with creatinine trending down subsequently and this morning is 1.05, previous baseline appears to be 0.9    Plan: Continue with IV fluids, recheck tomorrow for ongoing stability       Seizure-like activity (H)    Assessment: Occurring prior to this hospitalization at [facility] environment, no seizure-like activity has been noted during the stay.  Patient does have a possible hyperdense lesion on the brain concerning for a bleed but have been unable to proceed with the MRI recommended due to ongoing severity of illness and agitation not allowing MRI to be performed.    Plan: Continue to monitor for any signs of seizure-like activity, proceeding with repeat CT scan this morning.        Hyponatremia    Assessment: Present initially, resolved with fluid resuscitation    Plan: Continue to monitor for ongoing resolution      Cerebral palsy (H)    Assessment: Present at baseline    Plan: No acute intervention is needed, continue supportive cares.       Diet: Dysphagia Diet Level 2 [facility] Altered Thin Liquids (water, ice chips, juice, milk gelatin, ice cream, etc); Other - please comment    DVT Prophylaxis: Pneumatic Compression Devices  Armendariz Catheter: not present  Code Status: DNR/DNI         Disposition Plan   Expected discharge: 3-5 days, recommended to prior living arrangement likely with IV antibiotics once SIRS/Sepsis treated and cognition stabilized, work up is complete, appropriate antibiotics for discharge have been identified.  Entered: Santa Villegas MD 09/19/2020, 6:36 AM       The patient's care was  discussed with the Bedside Nurse, Patient and Patient's Family.    Satna Villegas MD  Hospitalist Service  [facility]    ______________________________________________________________________    Interval History   Patient remained afebrile overnight, HR and blood pressures trending downward back into normal range.  More cooperative overnight - was able to get some rest.  No Seroquel or Zyprexa needed overnight.  Oriented to safe but not time or place per nursing.  Needed 1 L NC oxygen while sleeping last night but weaned off this morning.  Patient is much more alert this morning and cooperative with exam, ate 100% of his breakfast without difficulty per nursing staff    Data reviewed today: I reviewed all medications, new labs and imaging results over the last 24 hours.    Physical Exam   Vital Signs: Temp: 99  F (37.2  C) Temp src: Axillary BP: 131/79 Pulse: 70   Resp: (!) 0 SpO2: 95 % O2 Device: None (Room air) Oxygen Delivery: 1 LPM  Weight: 220 lbs 3.83 oz  Constitutional: Awake, alert, no acute distress, cooperative but still mildly restless  Respiratory: No increased work of breathing, patient continues to have decreased breath sounds diffusely with some fine crackles in bilateral bases on auscultation, no wheezes  Cardiovascular: Regular rate and rhythm with frequent PACs and PVCs  GI: Bowel sounds present, abdomen is soft and nontender  Skin: no redness, warmth, or swelling and no rashes  Musculoskeletal: no lower extremity pitting edema present  Neurologic: Awake, alert, oriented only to self at this time but cooperative    Data   Recent Labs   Lab 09/19/20  1209 09/19/20  0633 09/18/20  1359 09/18/20  0603 09/17/20  0550  09/16/20  0534   WBC  --  17.4*  --  22.3* 24.0*  --  26.9*   HGB  --  9.9*  --  9.9* 9.0*  --  8.9*   MCV  --  92  --  89 89  --  91   PLT  --  127*  --  133* 133*  --  134*   * 146*  --  143 140  --  135   POTASSIUM 3.5 4.1 4.0 3.3* 3.5    < > 3.4   CHLORIDE 114* 113*  --  112* 110*  --  105   CO2 22 25  --  21 22  --  22   BUN 13 14  --  21 29  --  25   CR 1.05 1.05  --  1.25 1.69*  --  2.13*   ANIONGAP 9 8  --  10 8  --  8   MARIN 8.7 9.0  --  8.6 8.3*  --  7.9*   * 153*  --  165* 103*  --  88   ALBUMIN 2.2*  --   --   --   --   --  2.2*   PROTTOTAL 5.5*  --   --   --   --   --  5.2*   BILITOTAL 1.1  --   --   --   --   --  1.9*   ALKPHOS 62  --   --   --   --   --  58   ALT 23  --   --   --   --   --  14   AST 29  --   --   --   --   --  28   TROPI 0.349*  --   --   --   --   --   --     < > = values in this interval not displayed.

## 2020-09-19 NOTE — SIGNIFICANT EVENT
Significant Event Note    Time of event: 11:37 AM September 19, 2020    Description of event:  While down in the CT scanner the patient was initially restless but then settled down and allowed the exam to occur.  During the exam his heart rate appeared to drop to 45 for a second or two and then come back up to heart rate appeared to briefly drop to 45 for a few seconds but then recovered back into the 90s.  Oximeter was on patient's toe but appeared to be a good waveform.      When the nurse went to assess him after the exam had been completed and found his to appear unresponsive.  Blood pressures were 130/70s, oxygen level was 82% on RA and patient was started on oxymask at 5L with improvement of oxygen saturations noted.   Patient was suctioned for moderate amount of clear discharge.  Patient was felt stable to transport and was was brought immediately back to the ICU where he is assessed.      On entry into the room patient has tachypnea and audible crackles noted.  He is not responding to sound but does move to touch.  Patient's lungs sounds are diminished and tight with minimal air movement but concern for crackles on the right more than left lung base.  Satting 92% on the 5 L.  Heart sound irregular - monitor appears NSR with frequent PVC and PACs.  Pupils constricted but equal and reactive to light.  Patient is opening his eyes spontaneously intermittently but is not appearing to focus and does not respond to questions asked.    Nursing has gotten moderate amount of clear frothy discharge on deep suction and has repositioned patient - currently 95-96% on 2L after these intervention.  Continues to have crackles in the right lung base with mild expiratory wheezing noted.  Will give neb x1, continue with suctioning, perform STAT VBG, lactic acid, tropoinin, CMP, EKG and Chest x-ray.  Will give Lasix 40 mg x1 due to concern for vascular congestion.  Will keep patient NPO until his mentation improves.  Wean oxygen  as needed.      Chest x-ray performed and per this writers view there is ongoing right lower lobe pneumonia, perhaps slightly worse than previous with pleural effusion noted.  Patient is already on Zosyn which should cover aspiration pneumonia which is strongly suspected at this time.      Daughter, Josie, was informed of these events and patient's current status and she was already on her way in to see the patient.  Will continue with ICU management and monitor for lab results and imaging results (CT and formal read of the chest x-ray), discuss further with daughter when she arrives.      37 minutes has just been spent in management of this acutely decompensating patient.      Santa Villegas MD

## 2020-09-19 NOTE — PROGRESS NOTES
DATE:  9/19/2020   TIME OF RECEIPT FROM LAB:  858am  LAB TEST:  Lactic  LAB VALUE:  3.1   RESULTS GIVEN WITH READ-BACK TO (PROVIDER):  Villegas  TIME LAB VALUE REPORTED TO PROVIDER:   858am

## 2020-09-19 NOTE — PLAN OF CARE
Major Shift Events:  Care conference with Dr. Villegas and Josie (dtr).   Neuro: Lethargic thi shift.   Resp: 1 L Coarse/ expiratory wheezing  CV: SR with first degree AVB and BBB. VSS. Afebrile this shift   GI:Tolerated mech soft diet this am. Kept NPO due to LOC.   : Suprapubic cath with adequate urine output.   PIV X 2.     Plan: Continue POC.   For vital signs and complete assessments, please see documentation flowsheets.     Arianne Justice RN

## 2020-09-19 NOTE — SIGNIFICANT EVENT
Significant Event Note - Family Care Conference    Time of event: 4:06 PM September 19, 2020    Description of event:  Telecommunication care conference was performed with all 6 of patient's children present, several of their wives, and several grandchildren.  Reviewed patient's presentation, diagnosis of septic shock and concern for urosepsis as well as aspiration pneumonia with septicemia, concern for brain bleed initially as well as the cognitive changes over the past 24 hours and the results of the current CT scan, events of acute decompensation following the CT scan and patient's current status.  Also discussed the conversation had with Dr. Culp and overall prognosis expectation given patient's age, underlying health conditions and functional baseline, and current severity of illnesses.  Discussed the anticipated prolonged road to recovery as well as the likelihood that patient would have some improvement but would have low likelihood of making a full recovery back to his previous baseline despite optimization of all medical management options.  All questions were answered.  Did discuss the following 3 options    1.  Ongoing IV antibiotics and fluid resuscitation or consideration of pressor support if needed, plan for artificial nutrition either via TPN or NG tube temporarily with consideration of feeding tube going forward once medically safe, ongoing ICU management up to the point of patient's previously designated DNR/DNI status.  2.  Ongoing IV antibiotics and other interventions as deemed desired by family from the options listed above, but not pursuing any options that patient's family feel patient would not want to consider if he was aware of his current medical status.    3.  Transition to comfort care and focus on quality of life for the time he has remaining.  Discontinue IV antibiotics, let patient eat and drink as he desires, focus on symptom management.      At the end of this conference family  needed a few minutes alone in order to discuss these options more and decide what the patient would want if you are capable of speaking for himself.  Call back in a short while later and informed that all family is in agreement that patient would want to transition to comfort care at this time.  Informed family of current visitation policy on comfort care patients during this area of COVID and patient's 6 children are planning on coming in tonight.      54 minutes was spent in this care conference.      Santa Villegas MD

## 2020-09-19 NOTE — PROGRESS NOTES
4 hour shift note.  Patient is oriented to himself, he is unable to state where he is or when in time.  He is cooperative.    Tele is SR with 1st degree AVB and BBB, occasional PVCs and PACs  BP is elevated but he has not reached PRN Lopressor parameters.  He did receive scheduled Lopressor at bedtime.    LS are coarse and dim. After oral medications he has a cough.  Medications were administered with pudding, but this still appeared difficult for him.   Note for provider left. He was placed on 1 LPM NC for low oxygen saturation.  (Pink note left for provider as saturations less than 90%)    Turned and repositioned as needed for skin protection, Pulsate mattress in use.    Glucose 157 at bedtime, remains on MIVF with D10.

## 2020-09-19 NOTE — PROGRESS NOTES
Patient continues to improve from a respiratory standpoint and been weaned down to 1L oxygen to stable O2 saturations in the mid to upper 90s.  Chest x-ray shows concern for worsening right lower lobe aspiration pneumonia, no evidence of volume overload.  Other vitals stable.  Patient still is not very alert - will open eyes briefly spontaneously but is still not focusing, not responding to voice and only briefly responds to touch.      CT scan of the head shows worsening right frontal parenchymal hemorrhagic contusion with increasing hemorrhagic component.   Discussed with case with Dr. Culp, stroke neurologist on call for the [facility] who talked with cecille Galindo via telecommunication regarding possible etiology and implications of this finding as well as overall poor prognosis and very long complicated recovery ahead if the patient does survive (need for feeding tube/artificial nutrition, fluctuating mentation and mood, likely inability to return fully to previous baseline even with supportive care, etc).  No surgical or medical treatment is available - treatment is supportive care and time.  Josie is believing that the patient would not want consideration of artificial nutrition and would want to transition to comfort care but she does not want to make this decision alone.  Plan for care conference at 4:00 PM with any family that can be present via telecommunication to further discuss this new finding, overall clinical course and options going forward.      Electronically Signed:  Santa Villegas MD

## 2020-09-19 NOTE — PROGRESS NOTES
"Off floor with patient to CT scan. Patient monitored in CT. Patient was initially agitated with positioning for CT with VSS. Patient suddenly became hypoxic with sats in 60's with HR 45, writer changed probe site and sats now improved to 82%, requiring O2 @  5 L NC, HR improved to low 80\"s. Writer and CT tech transported patient back to room. Patient with increased work of breathing with RR 40's, Suctioned orally with improvement. Dr. Villegas at bedside. New orders received. Same done. Will monitor patient closely.     Arianne Justice RN     "

## 2020-09-19 NOTE — PROVIDER NOTIFICATION
DATE:  9/19/2020   TIME OF RECEIPT FROM LAB:  0243  LAB TEST:  procalcitonin  LAB VALUE:  7.78  RESULTS GIVEN WITH READ-BACK TO (PROVIDER):  Dr Bond  TIME LAB VALUE REPORTED TO PROVIDER:   0246

## 2020-09-19 NOTE — PLAN OF CARE
Patient is disoriented to place and situation. He inconsistently follows commands.   He did wear mitts for a short period of time, but he kept trying to gnaw at them.   He continues to pull at pulse ox on occasion.   Pt rested better once removed.     Tele is SR with 1st degree AVB/BBB  BP has been mostly within parameters.      LS are fine crackles on right middle and base otherwise diminished. He was on O2 for short period of time, but once pulse ox moved to toes readings improved. He is now on RA.    UOP has been adequate overnight.      Skin is fragile, he was turned and repositioned as needed.     Small scabbed blistered area noted on right ankle. According to report this was from shoes at [facility] [place].   Sacrum and Coccyx with blanchable redness. Sacral dressing removed as it was fairly moist and appeared to be making skin worse.

## 2020-09-19 NOTE — PROGRESS NOTES
S-Shift note  B-Sepsis  A-Patient alert, disorientated to place and situation. BP elevated, Sinus dysrhythmia with 1st degree AV block and BBB. Agitated, mitts in place to prevent removal of lines. Metoprolol prn given x1.  LR bolus given for elevated lactic acid.  R-Continue to monitor labs and VS.   Marcos Kinsey RN

## 2020-09-19 NOTE — PROVIDER NOTIFICATION
DATE:  9/18/2020   TIME OF RECEIPT FROM LAB:  2028  LAB TEST:  Lactic acid  LAB VALUE:  2.8  RESULTS GIVEN WITH READ-BACK TO (PROVIDER):  Dr. Fletcher  TIME LAB VALUE REPORTED TO PROVIDER:   2029  Marcos Kinsey RN

## 2020-09-19 NOTE — PROVIDER NOTIFICATION
DATE:  9/19/2020   TIME OF RECEIPT FROM LAB:  0640  LAB TEST:  lactic  LAB VALUE:  2.8  RESULTS GIVEN WITH READ-BACK TO (PROVIDER):  [facility]  TIME LAB VALUE REPORTED TO PROVIDER:   0641

## 2020-09-19 NOTE — PROGRESS NOTES
DATE:  9/19/2020   TIME OF RECEIPT FROM LAB:  8570  LAB TEST:  Troponin  LAB VALUE:  0.349  RESULTS GIVEN WITH READ-BACK TO (PROVIDER):  Dr. Villegas  TIME LAB VALUE REPORTED TO PROVIDER:   2471

## 2020-09-19 NOTE — PROGRESS NOTES
Per Dr. Villegas at bedside. Ok for patient to go to CT without Cardiac monitoring.     Arianne Justice RN

## 2020-09-20 PROBLEM — S06.2XAA: Status: ACTIVE | Noted: 2020-01-01

## 2020-09-20 NOTE — PLAN OF CARE
"/56 (BP Location: Left arm)   Pulse 89   Temp 99.1  F (37.3  C) (Axillary)   Resp (!) 32   Ht 1.778 m (5' 10\")   Wt 103 kg (227 lb 1.2 oz)   SpO2 92%   BMI 32.58 kg/m   Non-responsive, oxy mask on at 1L/min. Family in all saying goodbye this evening, in turns of 2 people at a time. Patient has been turned and repositioned Q2 hrs. Oral care done Q2 hrs, SP cath patent and draining clear yellow urine. Patient looks comfortable, daughter Josie agreed as well.    "

## 2020-09-20 NOTE — PROGRESS NOTES
[facility]    Medicine Progress Note - Hospitalist Service       Date of Admission:  9/15/2020  Assessment & Plan    Patient is an 88-year-old gentleman with past medical history of cerebral palsy who resides at a long-term care facility, history of recurrent urinary tract infections with suprapubic catheter at baseline who presented with altered mental status as well as concern for seizure-like activity on the day of presentation.  In the emergency room he was found to have sepsis which developed rapidly into septic shock and he was admitted to the intensive care unit for vasopressor support. He was noted to have an suspected UTI as well as concern concern for an aspiration pneumonia as infection possible sources.  He was also found to have a region of hyperdensity in his right frontal lob of his brain concerning for a brain bleed found on CT but patient was too critically ill to proceed with MRI for further evaluation.  He was hospitalized and started on Vancomycin and Zosyn, Levophed, and fluid resuscitation.  Urosepsis is now strongly suspected with blood cultures from 9/15/2020 growing out Proteus, Enterococcus, and with Pseudomonas.      Patient appeared to be clinically improving from a sepsis standpoint and was able to wean off pressor support with resolution of his acute kidney injury and initial improvement in his mentation.  However in subsequent days his cognition started to rapidly worsen.  He underwent a repeat CT scan of the head of 9/19/2020 (during which time he had an suspected aspiration event with subsequent respiratory distress and worsening pneumonia on repeat chest X-ray) and an enlargening brain contusion was found in the right frontal lobe.  Following discussion with neurology specialist regarding this finding and it's prognosis (prolonged recovery, need for artificial nutrition, ongoing fluctuation of mentation with associated seizures and agitation,  anticipated patient will not return to previous cognitive baseline, etc) and care conversation with family members, decision was made to transition patient to comfort care based on the patient's previously expressed wishes.  Patient continues to be minimally responsive to unresponsive but does not appear in any distress and symptoms well managed with current regimen.  Continues to have adequate urinary output but is having cooling of his extremities and some mottling noted on exam today.  Patient will be monitor for ongoing symptom control throughout today with possible discharge to nursing home vs home with family and hospice services if death is not anticipated tomorrow.      Principal Problem:    Septic shock (H)  Active Problems:    Complicated UTI (urinary tract infection)    Acute kidney injury (H)    Bacteremia    Encephalopathy    Cerebral palsy (H)    Altered mental status    Seizure-like activity (H)    Hyponatremia    Contusion of brain (H)       Diet: oral intake as desired by the patient - he has not wanted anything today  DVT Prophylaxis: hospice patient - none needed  Armendariz Catheter: not present  Code Status: DNR/DNI         Disposition Plan   Expected discharge: Tomorrow, recommended to nursing home with hospice vs home with family support and hospice depending on family's decision once safe disposition has been found has been found and patient symptoms are well controlled.  Entered: Santa Villegas MD 09/20/2020, 7:57 AM       The patient's care was discussed with the Bedside Nurse, Care Coordinator/ and Patient's Family.    Santa Villegas MD  Hospitalist Service  [facility]    ______________________________________________________________________    Interval History   Patient has appeared comfortable overnight without signs of significant distress.  He did have some apnea episodes in the middle of the night but appear to have now  resolved.  No new nursing concerns    Data reviewed today: No new data was present to review secondary to comfort care status    Physical Exam   Vital Signs: Temp: 99.1  F (37.3  C) Temp src: Axillary BP: 101/56 Pulse: 89   Resp: (!) 32 SpO2: 92 % O2 Device: Oxymask Oxygen Delivery: 1 LPM  Weight: 227 lbs 1.18 oz  Constitutional: Resting comfortably on entry into the room, does not awaken to voice or to touch but does start moaning and moving away from exam when attempted but not specific to a certain location -any movement of his body or pressurized touch causes him to attempt to withdraw.  He settles down quickly when left alone.  Is tachypneic without apneic episodes identified during this visit  Respiratory: Patient has tachypnea, continues to have crackles in the right more than left lower lung base, no death rattle is present, no apnea spells during this evaluation   Cardiovascular: Regular rate and rhythm with heart rate in the 1 teens to 120s  GI: Bowel sounds are hypoactive, abdomen is soft with patient attempting to move to avoid exam but no obvious guarding present  Skin: Patient's feet and lower extremities are cool to the touch and there appears to be mottling starting  Musculoskeletal: Patient does have pitting edema in arms and legs noted, does have spontaneous movement of extremities when attempting to avoid exam but otherwise does not have spontaneous movement  Neurologic: patient is not responding to voice, withdraws from touch when exam is attempted

## 2020-09-20 NOTE — PROGRESS NOTES
S-(situation): shift note, move out of ICU onto med/surg floor    B-(background): septic shock, comfort cares    A-(assessment): pt lethargic and non-responsive throughout the shift. oxymask removed during the shift. Around 0100, pt began having some apneic episodes. Apneic episodes subsided around 0300. Pt showed no signs of pain. Pt was suctioned each time. Oral cares completed throughout the shift. And pt repositioned as needed to keep him comfortable. Armendariz intact. Pt transferred from room 215 to room 252 during the shift. Same staff was maintained during and after transfer.    R-(recommendations): will continue to monitor the pt and keep him comfortable. Report given to the oncoming RN

## 2020-09-20 NOTE — PLAN OF CARE
More talkative and responsive this afternoon. Had a few bites of a popsicle. Urine output for 7-3 shift was 375ml. VSS. No mottling noted. Warm extremities and active bowel sounds. Easy respirations. Repositioned every three hours. One to two family members here at bedside at all times today. Enjoyed listening to country western music. Will continue to monitor neuros, skin, breathing, output, safety.

## 2020-09-21 PROBLEM — J69.0 ASPIRATION PNEUMONIA (H): Status: ACTIVE | Noted: 2020-01-01

## 2020-09-21 NOTE — DISCHARGE SUMMARY
Speech Language Therapy Discharge Summary    Reason for therapy discharge:    No further expectations of functional progress.    Progress towards therapy goal(s). See goals on Care Plan in Epic electronic health record for goal details.  Therapy not appropriate at this time as patient has transitioned to comfort cares.    Therapy recommendation(s):    No further therapy is recommended.

## 2020-09-21 NOTE — PROGRESS NOTES
Name: Diallo Villarreal    Admit Date and Time: 9/15/2020  5:27 PM    MRN#: [id number]    Reason for Hospitalization: Hyponatremia [E87.1]  Pneumonia [J18.9]  Encephalopathy [G93.40]  Acute kidney injury (H) [N17.9]  Severe sepsis (H) [A41.9, R65.20]    Discharge Date: 9/21/2020    Patient / Family response to discharge plan: in agreement    Other Providers (Care Coordinator, County Services, PCA services etc): No    CTS Hand Off Completed: Yes    PAS #: N/A    LUCIANO Score: Average    Future Appointments: No future appointments.    Discharge Disposition: long term care facility - return to [facility] (Main Phone: [phone] Admissions Phone: [phone] Fax: [phone]) LTC with [facility].  Ambulance transport.    Discharge Planner   Discharge Plans in progress: Hospice   Barriers to discharge plan: None  Follow up plan: [facility]    JAMES Sargent  [facility] [phone]/ [place] [phone]         Entered by: Thais Pompa 09/21/2020 10:52 AM

## 2020-09-21 NOTE — PROGRESS NOTES
Diallo Villarreal  Gender: male  : 1932  [facility]  [address] [facility] [address]  [phone] (home)     Medical Record: [id number]  Pharmacy: [facility] - [place], [place] - [address]  Primary Care Provider: Jennifer Bryant    Parent's names are: Data Unavailable (mother) and Data Unavailable (father).      [facility]  2020     Discharge Phone Call:  Discharge to  [facility]

## 2020-09-21 NOTE — PLAN OF CARE
S-(situation): Patient discharged to [facility] via ambulance     B-(background): end of life care    A-(assessment): family at bedside, alert to self, more coherent today versus yesterday    R-(recommendations): Discharge instructions reviewed with patients family. Listed belongings gathered and returned to patient.          Discharge Nursing Criteria:     Care Plan and Patient education resolved: Yes    New Medications- pt has been educated about purpose and side effects: Yes    Vaccines  Influenza status verified at discharge:  Yes    Intentionally Retained Items (examples: Drains, Wound packing, ureteral stents, mock, PICC line or IV)  Patient was sent home with suprapubic catheter left in place.   Follow up appointment made for removal: No    MISC  Prescriptions if needed, hard copies sent with patient  yes  Home and hospital aquired medications returned to patient: NA  Medication Bin checked and emptied on discharge Yes  Patient reports post-discharge pain management plan is effective: Yes

## 2020-09-21 NOTE — DISCHARGE SUMMARY
[facility]  Hospitalist Discharge Summary      Date of Admission:  9/15/2020  Date of Discharge:  9/21/2020  Discharging Provider: Santa Villegas MD    Discharge Diagnoses   Principal Problem:    Septic shock (H)  Active Problems:    End of Life Care    Complicated UTI (urinary tract infection)    Acute kidney injury (H)    Bacteremia    Encephalopathy    Aspiration pneumonia (H)    Acute respiratory failure with hypoxia - secondary to aspiration pneumonia    Hypomagnesium    Acute on chronic anemia    Cerebral palsy (H)    Altered mental status    Seizure-like activity (H)    Hyponatremia    Contusion of brain (H)    Follow-ups Needed After Discharge   Follow-up Appointments     Follow Up and recommended labs and tests      Enroll in hospice on arrival to [facility]           Discharge Disposition   Discharged to [facility]  Condition at discharge: Terminal    Hospital Course   Patient is an 88-year-old gentleman with past medical history of cerebral palsy who resides at a long-term care facility, history of recurrent urinary tract infections with suprapubic catheter at baseline who presented on 9/15/2020 with altered mental status as well as concern for seizure-like activity on the day of presentation.  In the emergency room he was found to have sepsis which developed rapidly into septic shock and he was admitted to the intensive care unit for vasopressor support. He was noted to have an suspected UTI as well as concern concern for an aspiration pneumonia as infection possible sources.  He was also found to have a region of hyperdensity in his right frontal lob of his brain concerning for a brain bleed found on CT but patient was too critically ill to proceed with MRI for further evaluation.  He was hospitalized and started on Vancomycin and Zosyn, Levophed, and fluid resuscitation.  Urosepsis is now strongly suspected with blood cultures  from 9/15/2020 growing out Proteus, Enterococcus, and with Pseudomonas.      Patient appeared to be clinically improving from a sepsis standpoint and was able to wean off pressor support with resolution of his acute kidney injury and initial improvement in his mentation.  However in subsequent days his cognition started to rapidly worsen.  He underwent a repeat CT scan of the head of 9/19/2020 (during which time he had an suspected aspiration event with subsequent respiratory distress and worsening pneumonia on repeat chest X-ray) and an enlargening brain contusion was found in the right frontal lobe.  Following discussion with neurology specialist regarding this finding and it's prognosis (prolonged recovery, need for artificial nutrition, ongoing fluctuation of mentation with associated seizures and agitation, anticipated patient will not return to previous cognitive baseline, etc) and care conversation with family members, decision was made to transition patient to comfort care based on the patient's previously expressed wishes.  He does not appear in any distress and symptoms well managed with current regimen.  Patient is being discharged by to [facility] with initiation of [facility] to start this afternoon    Principal Problem:    Septic shock (H)  Active Problems:    Complicated UTI (urinary tract infection)    Acute kidney injury (H)    Bacteremia    Encephalopathy    Cerebral palsy (H)    Altered mental status    Seizure-like activity (H)    Hyponatremia    Contusion of brain (H)      Consultations This Hospital Stay   SPEECH LANGUAGE PATH ADULT IP CONSULT  CARE TRANSITION RN/SW IP CONSULT  PHARMACY TO DOSE VANCO  PHARMACY IP CONSULT    Code Status   No CPR- Do NOT Intubate    Time Spent on this Encounter   ISanta MD, personally saw the patient today and spent greater than 30 minutes discharging this patient.       Santa Villegas MD  [place]  [facility]  ______________________________________________________________________    Physical Exam   Vital Signs:           Resp: 22       No other vitals taken secondary to comfort care status  Weight: 227 lbs 1.18 oz  Constitutional: awakens to voice and is able to answer yes and no appropriately to questions asked today and appears to understand information given to him  Respiratory: mild tachypnea present, decreased breath sounds, ongoing crackles in right more than left lower lung base  Cardiovascular: sinus tachycardia noted around 110-120s  GI: bowel sounds present, abdomen soft and non-tender  Skin: skin is warm and mottling noted yesterday is improved today, lower legs are no longer cold and are now slightly hot  Musculoskeletal: patient has pitting edema in all extremities, worst on right arm   Neurologic: awake, alert, responds to voice, is aware he is dying, confirms he does not want artificial nutrition.         Primary Care Physician   Jennifer Bryant    Discharge Orders      General info for SNF    Length of Stay Estimate: Long Term Care  Condition at Discharge: Terminal  Level of care:skilled   Rehabilitation Potential: Poor  Admission H&P remains valid and up-to-date: Yes  Recent Chemotherapy: N/A  Use Nursing Home Standing Orders: Yes     Mantoux instructions    Give two-step Mantoux (PPD) Per Facility Policy Yes     Reason for your hospital stay    1.  Brain contusion in your right frontal lobe which is causing confusion, unsafe eating and drinking, fluctuating levels of awareness and understanding.  2.  Aspiration pneumonia  3.  Urosepsis   You are on comfort cares and will be returning to [facility] with hospice involvement to make your returning time here on earth as comfortable as possible.     Follow Up and recommended labs and tests    Enroll in hospice on arrival to [facility]     Additional Discharge Instructions    Activity - bedrest  Oxygen 2-4 L as  needed for comfort     Advance Diet as Tolerated    Follow this diet upon discharge: as desired by the patient       Significant Results and Procedures   Results for orders placed or performed during the hospital encounter of 09/15/20   XR Chest Port 1 View    Narrative    CHEST ONE VIEW  9/15/2020 7:36 PM     HISTORY: Sepsis.    COMPARISON: None.      Impression    IMPRESSION: Elevated right hemidiaphragm. Question some minimal  atelectasis and/or infiltrate at the left base, although this is not  certain. Left lung clear.    ALEXANDRA RODGERS MD   CT Head w/o Contrast     Value    Radiologist flags Findings concerning for acute intracranial (AA)    Narrative    CT SCAN OF THE HEAD WITHOUT CONTRAST   9/15/2020 8:04 PM     HISTORY: Seizure, new, nontraumatic, greater than 40 years    TECHNIQUE:  Axial images of the head and coronal reformations without  IV contrast material. Radiation dose for this scan was reduced using  automated exposure control, adjustment of the mA and/or kV according  to patient size, or iterative reconstruction technique.    COMPARISON: None.    FINDINGS: There is an ovoid hyperdense focus involving the cortex of  the right frontal pole (series 5 image 18) most concerning for small  acute parenchymal hemorrhage. Other considerations such as a  hyperdense mass or slow-flow vascular malformation/cavernoma cannot be  completely excluded. This lesion measures approximately 9 mm x 6 mm x  9 mm. There is encephalomalacia in the left paraventricular region,  primarily adjacent to the body and atrium of the left lateral  ventricle, with ex vacuo dilatation of the left lateral ventricle. No  evidence for hydrocephalus. The ventricles are otherwise normal in  size and configuration. Moderate generalized brain parenchymal volume  loss and mild to moderate scattered hypoattenuation in the  periventricular and deep cerebral white matter likely representing  chronic small vessel ischemic disease. No  extra-axial fluid collection  or significant mass effect/midline shift. Scattered atherosclerotic  disease primarily involving the carotid siphons.    The visualized aspects of the paranasal sinuses and mastoids are  clear. Bilateral lens replacements. The calvarium and skull base are  grossly intact.      Impression    IMPRESSION:  1. Ovoid hyperdense parenchymal lesion involving the right frontal  pole, most concerning for small acute parenchymal hemorrhage. Given  the absence of a history of trauma, spontaneous parenchymal hemorrhage  in the setting of cerebral amyloid angiopathy must be considered.  Other considerations such as a hyperdense mass or slow-flow vascular  malformation such as a cavernoma are not completely excluded.  Recommend MRI without and with contrast for further characterization.  2. No other definite acute intracranial findings.  3. Left periventricular encephalomalacia with ex vacuo dilatation of  the left lateral ventricle.  4. Generalized brain parenchymal volume loss and presumed chronic  small vessel ischemic changes.    [Critical Result: Findings concerning for acute intracranial  hemorrhage]    Finding was identified on 9/15/2020 8:09 PM.     Dr. Gavin was contacted by Dr. Justin on 9/15/2020 8:20 PM and  verbalized understanding of the critical result.     ALEXANDRA JUSTIN MD   XR Chest Port 1 View    Narrative    XR CHEST PORT 1 VW 9/16/2020 3:54 PM    HISTORY: central line check    COMPARISON: Chest x-ray 9/15/2020      Impression    IMPRESSION: Interval placement of a right IJ CVC with tip in the lower  SVC. No pneumothorax. Increased consolidation/atelectasis in the right  base. The left lung is relatively clear. Stable heart size.    VÍCTOR ELLIS MD   US Renal Complete    Narrative    RENAL ULTRASOUND   9/18/2020 4:41 PM     HISTORY: Septic shock, urinary source suspected, evaluate for  hydronephrosis/obstruction.    COMPARISON: None.    FINDINGS:  The right kidney is limited  in evaluation due to the  patient unable and unwilling to roll on to his back. No obvious  hydronephrosis is identified.    There is a large, exophytic, simple appearing cyst extending from the  inferior pole of the left kidney measuring 6.3 x 4.3 x 6.5 cm. Left  kidney is otherwise grossly unremarkable. No hydronephrosis or renal  mass. Left kidney is upper normal size at 14.4 cm in length and has a  normal cortical thickness of 1.4 cm.    Incidental note of a large infrarenal abdominal aortic aneurysm  measuring up to 6.8 x 7.3 cm transaxially and 12.9 cm in length.    Urinary bladder is not seen and is not be evaluated. No obvious free  fluid collections are seen in the visualized portions of the  peritoneum.      Impression    IMPRESSION:  1. Limited study due to inability or unwillingness to cooperate the  ultrasound technologist.  2. Large simple cyst inferior pole left kidney.  3. No definite hydronephrosis is seen.  4. Large abdominal aortic aneurysm.    I discussed these findings with Dr. Villegas on 9/18/2020 at 4:53 PM.    MASON CASTAÑEDA MD   CT Head w/o contrast*    Narrative    CT SCAN OF THE HEAD WITHOUT CONTRAST   9/19/2020 11:33 AM     HISTORY: Hyperdense region seen on scan from 9/15 - MRI recommended  but patient not stable enough to perform, increased confusion,  reevaluate hyperdense region.    TECHNIQUE:  Axial images of the head and coronal reformations without  IV contrast material.  Radiation dose for this scan was reduced using  automated exposure control, adjustment of the mA and/or kV according  to patient size, or iterative reconstruction technique.    COMPARISON: 9/15/2020.    FINDINGS: Previously seen small parenchymal hemorrhage in right  frontal lobe has increased in size and currently measures  approximately 1.9 x 2.7 x 1.7 cm. There is also a small amount of  surrounding edema. There is mild localized mass effect but no midline  shift. Moderate cerebral atrophy is present. Minimal  nonspecific white  matter changes are present without mass effect. There is no evidence  for acute ischemic infarct or skull fracture. Visualized paranasal  sinuses and mastoid air cells are clear.      Impression    IMPRESSION: Right frontal parenchymal hemorrhagic contusion with  increasing hemorrhagic component since prior exam. There is no midline  shift.    GUSTAVO KOTHARI MD   XR Chest Port 1 View    Narrative    CHEST ONE VIEW PORTABLE   2020 11:48 AM     HISTORY: Sudden onset hypoxia, decreased level of consciousness.    COMPARISON: 2020      Impression    IMPRESSION: Worsening airspace opacity at the right lung base  associated with pleural fluid. Left lung is clear. No pneumothorax.  Findings concerning for worsening pneumonia.    NURA BISHOP MD   Echocardiogram Limited    Narrative    [id number]  [id number]  [id number]  [id number]           [facility]  Echocardiography Laboratory  [address] [place] [address]        Name: ARUN ANDRE  MRN: [id number]  : 1932  Study Date: 2020 01:09 PM  Age: 88 yrs  Gender: Male  Patient Location: [facility]  Reason For Study: Other, Please Specify in Comments  History: COPD, HTN  Ordering Physician: JAS KENDALL  Referring Physician: Jennifer Bryant  Performed By: Dorian Murphy     BSA: 2.1 m2  Height: 70 in  Weight: 201 lb  HR: 86  BP: 80/40 mmHg  _____________________________________________________________________________  __        Procedure  Limited Echo Adult. Poor quality two-dimensional was performed and  interpreted. Poor quality color and spectral Doppler were performed and  interpreted. Technically difficult study.  _____________________________________________________________________________  __        Interpretation Summary     Technically suboptimal, limited study.     1. Normal left ventricular size and systolic function. Estimated LVEF 60-65%.  2. No apparent wall motion  abnormality.  3. Grossly normal right ventricular size and systolic function.  4. Atria and cardiac valves not well visualized. No hemodynamically  significant valve disease based on limited Doppler data.     There is no comparison study available.  _____________________________________________________________________________  __        Left Ventricle  The left ventricle is normal in size. There is normal left ventricular wall  thickness. Left ventricular systolic function is normal. The visual ejection  fraction is estimated at 60-65%. Left ventricular diastolic function is not  assessable. Regional wall motion abnormalities cannot be excluded due to  limited visualization.     Right Ventricle  The right ventricle is grossly normal size. Right ventricular function cannot  be assessed due to poor image quality.     Atria  The left atrium is not well visualized. Right atrium not well visualized.     Mitral Valve  The mitral valve is not well visualized. There is trace mitral regurgitation.        Tricuspid Valve  The tricuspid valve is not well visualized. There is mild to moderate (1-2+)  tricuspid regurgitation. The right ventricular systolic pressure is  approximated at 31.4 mmHg plus the right atrial pressure.     Aortic Valve  The aortic valve is not well visualized. There is trace aortic regurgitation.  No hemodynamically significant valvular aortic stenosis.     Pulmonic Valve  The pulmonic valve is not well seen, but is grossly normal. There is trace  pulmonic valvular regurgitation. Normal pulmonic valve velocity.     Vessels  The aortic root is not well visualized. Inferior vena cava not well visualized  for estimation of right atrial pressure.     Pericardium  There is no pericardial effusion.        Rhythm  Sinus rhythm was noted.  _____________________________________________________________________________  __           Doppler Measurements & Calculations  TR max danielle: 280.3 cm/sec  TR max P.4  mmHg           _____________________________________________________________________________  __           Report approved by: Dr Vu Bhatia 09/16/2020 03:33 PM            Discharge Medications   Current Discharge Medication List      START taking these medications    Details   acetaminophen (TYLENOL) 650 MG suppository Place 1 suppository (650 mg) rectally every 6 hours as needed for mild pain or fever (temperature over 100  F )  Qty: 5 suppository, Refills: 0    Associated Diagnoses: End of life care; Contusion of brain without loss of consciousness, initial encounter (H)      artificial saliva (BIOTENE MT) SOLN solution Take 2 mLs (2 sprays) by mouth every hour as needed for dry mouth  Qty: 1 Bottle, Refills: 0    Associated Diagnoses: End of life care; Contusion of brain without loss of consciousness, initial encounter (H)      artificial tears (GENTEAL) 0.1-0.2-0.3 % ophthalmic solution Place 1-2 drops into both eyes every 8 hours as needed for dry eyes  Qty: 15 mL, Refills: 0    Associated Diagnoses: End of life care; Contusion of brain without loss of consciousness, initial encounter (H)      atropine 1 % ophthalmic solution Place 1-2 drops into both eyes every hour as needed for other (secretions)  Qty: 15 mL, Refills: 0    Associated Diagnoses: End of life care; Contusion of brain without loss of consciousness, initial encounter (H)      LORazepam (ATIVAN) 0.5 MG tablet Place 2 tablets (1 mg) under the tongue every 3 hours as needed for anxiety, seizures or nausea (dyspnea)  Qty: 5 tablet, Refills: 0    Associated Diagnoses: End of life care; Contusion of brain without loss of consciousness, initial encounter (H)      mineral oil-hydrophilic petrolatum (AQUAPHOR) external ointment Apply topically every 8 hours as needed for dry skin  Qty: 198 g, Refills: 0    Associated Diagnoses: End of life care; Contusion of brain without loss of consciousness, initial encounter (H)      morphine sulfate HIGH  CONCENTRATE (ROXANOL) 10 mg/0.5 mL (HIGH CONC) solution Place 0.25-0.5 mLs (5-10 mg) under the tongue every 2 hours as needed for moderate to severe pain  Qty: 15 mL, Refills: 0    Associated Diagnoses: End of life care; Contusion of brain without loss of consciousness, initial encounter (H)      ondansetron (ZOFRAN-ODT) 4 MG ODT tab Take 1 tablet (4 mg) by mouth every 6 hours as needed for nausea or vomiting  Qty: 5 tablet, Refills: 0    Associated Diagnoses: End of life care; Contusion of brain without loss of consciousness, initial encounter (H)         STOP taking these medications       acetaminophen (TYLENOL) 500 MG tablet Comments:   Reason for Stopping:         aspirin 81 MG EC tablet Comments:   Reason for Stopping:         atorvastatin (LIPITOR) 40 MG tablet Comments:   Reason for Stopping:         cetirizine (ZYRTEC) 10 MG tablet Comments:   Reason for Stopping:         cinacalcet (SENSIPAR) 30 MG tablet Comments:   Reason for Stopping:         ferrous sulfate (FEROSUL) 325 (65 Fe) MG tablet Comments:   Reason for Stopping:         furosemide (LASIX) 20 MG tablet Comments:   Reason for Stopping:         lidocaine (LMX4) 4 % external cream Comments:   Reason for Stopping:         metoprolol tartrate (LOPRESSOR) 50 MG tablet Comments:   Reason for Stopping:         mometasone (ELOCON) 0.1 % external ointment Comments:   Reason for Stopping:         nitroGLYcerin (NITROSTAT) 0.4 MG sublingual tablet Comments:   Reason for Stopping:         pantoprazole (PROTONIX) 40 MG EC tablet Comments:   Reason for Stopping:         senna-docusate (SENOKOT-S/PERICOLACE) 8.6-50 MG tablet Comments:   Reason for Stopping:         vitamin D3 (CHOLECALCIFEROL) 1000 units (25 mcg) tablet Comments:   Reason for Stopping:             Allergies   Allergies   Allergen Reactions     Gluten Meal Other (See Comments)     Celiac disease     Wheat Extract Other (See Comments)

## 2020-09-21 NOTE — PLAN OF CARE
"Pt has been resting between cares, has rambled incoherently to self. Denies pain, answers \"no\". Opens eyes and has eye contact with nurses. Turned and repositioned every 3-4 hours for comfort, oral cares provided every two hours. Lungs are clear but diminished. UOP 225mL overnight. Will continue with comfort cares.   "

## 2020-09-22 NOTE — LETTER
9/22/2020        RE: Diallo Villarreal  [facility]  [address] [facility] [place] [address]        [facility]  PRIMARY CARE PROVIDER AND CLINIC:  ROCÍO Larsen [facility], [address] [place] [address] [place] [address]  Chief Complaint   Patient presents with     Hospital F/U     [facility] Medical Record Number:  [id number]  Place of Service where encounter took place:  [facility] AND REHAB [facility] ([facility]) [[id number]]    Diallo Villarreal  is a 88 year old  (8/31/1932), admitted to the above facility from  [facility]. Hospital stay 9/15/20 through 9/21/20..  Admitted to this facility for  hospice.    HPI:    HPI information obtained from: facility chart records, facility staff, patient report, [facility] chart review and family/first contact pt's son's report.   Brief Summary of Hospital Course:   Patient is an 88 year old gentleman with PMH including CP, celiac disease, HTN, urinary retention with recurrent UTIs s/p SPC placement now, CAD, s/p PCI (2018), AAA, CKD3, hypercalcemia, Dementia, normocytic anemia, recurrent atopic dermatitis, COPD who has resided at [facility] since 3/2019 where he moved from his group home d/t increased care needs.    On 9/15/20 nursing called to report patient was unresponsive and only slightly later appeared to have had a seizure.  EMS was called and patient was admitted to [facility] with sepsis which progressed quickly to septic shock thought 2/2 complicated UTI and possible aspiration PNA.  Eventually blood cultures grew Proteus, Enterococcus, Pseudamonas. He was transferred to ICU for pressor support, IV fluid resuscitation and IV antibiotics.  He suffered an RAJANI which resolved with treatment of his septic shock.  He was found to have a right frontal brain contusion and was too unstable for MRI.  As his sepsis improved, he was found to have worsening cognition and repeat CT showed growing of the contusion.   "Prognosis was discussed with family who elected to move towards comfort cares.  He was enrolled in [facility] and transferred back to [facility].    Updates on Status Since Skilled nursing Admission:   Patient is met today in his SNF room where he has family at bedside.  His son reports he appears comfortable but seems to be \"dry\" and likes the mouth spray more than than swabs.  He has also applied chapstick.    Diallo is not responsive but does have his eyes open at this time. He appears comfortable.  All non-comfort meds have been DC'd.      CODE STATUS/ADVANCE DIRECTIVES DISCUSSION:   DNR / DNI  Patient's living condition: lives in a skilled nursing facility  ALLERGIES: Gluten meal and Wheat extract  PAST MEDICAL HISTORY:  has a past medical history of AAA (abdominal aortic aneurysm) (H), Celiac sprue, and HTN (hypertension).  PAST SURGICAL HISTORY:   has a past surgical history that includes tonsillectomy and LEFT CAROTID ENDARTERECTOMY.  FAMILY HISTORY: family history is not on file.  SOCIAL HISTORY:   reports that he has quit smoking. He has never used smokeless tobacco. He reports that he does not drink alcohol.    Post Discharge Medication Reconciliation Status: discharge medications reconciled and changed, per note/orders    Current Outpatient Medications   Medication Sig Dispense Refill     morphine sulfate HIGH CONCENTRATE (ROXANOL) 10 mg/0.5 mL (HIGH CONC) solution Place 0.25 mLs (5 mg) under the tongue every hour as needed for moderate to severe pain 15 mL 0     acetaminophen (TYLENOL) 650 MG suppository Place 1 suppository (650 mg) rectally every 6 hours as needed for fever or mild pain (temperature over 100 F) 5 suppository 0     artificial saliva (BIOTENE MT) SOLN solution Take 2 mLs (2 sprays) by mouth every hour as needed for dry mouth 1 Bottle 0     artificial tears (GENTEAL) 0.1-0.2-0.3 % ophthalmic solution Place 1-2 drops into both eyes every 8 hours as needed for dry eyes " 15 mL 0     atropine 1 % ophthalmic solution Place 1-2 drops into both eyes every hour as needed for other (secretions) 15 mL 0     LORazepam (ATIVAN) 0.5 MG tablet Place 2 tablets (1 mg) under the tongue every 3 hours as needed for anxiety, seizures or nausea (dyspnea) 5 tablet 0     mineral oil-hydrophilic petrolatum (AQUAPHOR) external ointment Apply topically every 8 hours as needed for dry skin 198 g 0     ondansetron (ZOFRAN-ODT) 4 MG ODT tab Take 1 tablet (4 mg) by mouth every 6 hours as needed for nausea or vomiting 5 tablet 0       ROS:  Unobtainable secondary to cognitive impairment.     Vitals:  BP (!) 187/155   Pulse 56   Temp 97.6  F (36.4  C)   Resp 20   Wt 94.3 kg (207 lb 12.8 oz)   SpO2 (!) 86%   BMI 29.82 kg/m    Exam:  GENERAL APPEARANCE:  Alert but not responsive, in no distress, frail elderly man likely at end of life.   RESP:  respiratory effort normal, no respiratory distress  CV:  No LE peripheral edema  ABDOMEN:  nondistended  M/S:   Gait and station - in bed at today's visit, Digits and nails with baseline arthritic changes, significantly reduced muscle mass  SKIN:  Inspection and Palpation of skin and subcutaneous tissue pale, dry,   PSYCH:  insight and judgement, memory with impairment, does not follow commands      Lab/Diagnostic data:  CBC RESULTS:   Recent Labs   Lab Test 09/19/20  0633 09/18/20  0603   WBC 17.4* 22.3*   RBC 3.40* 3.40*   HGB 9.9* 9.9*   HCT 31.3* 30.3*   MCV 92 89   MCH 29.1 29.1   MCHC 31.6 32.7   RDW 15.7* 14.9   * 133*       Last Basic Metabolic Panel:  Recent Labs   Lab Test 09/19/20  1209 09/19/20  0633   * 146*   POTASSIUM 3.5 4.1   CHLORIDE 114* 113*   MARIN 8.7 9.0   CO2 22 25   BUN 13 14   CR 1.05 1.05   * 153*       Liver Function Studies -   Recent Labs   Lab Test 09/19/20  1209 09/16/20  0534   PROTTOTAL 5.5* 5.2*   ALBUMIN 2.2* 2.2*   BILITOTAL 1.1 1.9*   ALKPHOS 62 58   AST 29 28   ALT 23 14       TSH   Date Value Ref Range Status    09/15/2020 2.13 0.40 - 4.00 mU/L Final   03/15/2019 1.34 0.40 - 4.00 mU/L Final   ]    Lab Results   Component Value Date    A1C 5.5 07/13/2020       ASSESSMENT/PLAN:  Septic shock (H)  Bacteremia  Complicated UTI (urinary tract infection)  Aspiration pneumonitis (H)  Sepsis progressed quickly to Septic shock  Thought 2/2 complicated UTI and aspiration PNA   Significantly elevated lactic acid  Blood cultures grew out Proteus, Enterococcus, Pseudamonas  Treated with pressors, IV Fluid resuscitation, IV ANTIBIOTICS,   Sepsis improved but patient continued to decline d/t brain contusion - see below.    Care Conference and limited prognosis shared, family elected for comfort cares with hospice.     PLAN:  - enrolled in [facility]  - nursing for supportive cares     Acute renal failure superimposed on stage 3 chronic kidney disease, unspecified acute renal failure type  9/15/20: BUN 21, Creat 1.82, GFR 32  9/16/20: BUN 25, Creat 2.13, GFR 27  9/18/20: BUN 21, Creat 1.25, GFR 51  9/19/20: BUN 13, Creat 1.05, GFR 63    RAJANI 2/2 septic shock - treated with IVF resuscitation.      PLAN:  - comfort focus   - Will avoid nephrotoxic agents (such as ACEI/ARB/NSAIDs, IV contrast) and mindful prescribing with renal dosing.       Spastic hemiplegic cerebral palsy (H)  Seizure-like activity (H)  Encephalopathy  Contusion of brain with loss of consciousness, subsequent encounter   Hospice care patient  See above regarding sepsis.  As septic shock improved, encephalopathy did not and CT showing right frontal brain contusion which worsened by next CT scan.  Neurology consulted and noted limited prognosis, family elected to move towards comfort focus with admission to [facility].  Patient admitted to [facility] on 9/21/20 with terminal dx of Encephalopathy.     Comfort meds are in place - will clarify Morphine order.     PLAN:  - Appreciate [facility] team for their diligent care and  attention  - Appreciate SNF staff for their diligent care and attention    Electronically signed by:  ROCÍO Larsen CNP                     Sincerely,        ROCÍO Larsen CNP

## 2020-09-22 NOTE — PROGRESS NOTES
[facility]  PRIMARY CARE PROVIDER AND CLINIC:  Jennifer Bryant, ROCÍO CNP, [address] [place] [address] / [place] [address]  Chief Complaint   Patient presents with     Hospital F/U     [facility] Medical Record Number:  [id number]  Place of Service where encounter took place:  [facility] AND REHAB [facility] ([facility]) [[id number]]    Diallo Villarreal  is a 88 year old  (8/31/1932), admitted to the above facility from  [facility]. Hospital stay 9/15/20 through 9/21/20..  Admitted to this facility for  hospice.    HPI:    HPI information obtained from: facility chart records, facility staff, patient report, [facility] chart review and family/first contact pt's son's report.   Brief Summary of Hospital Course:   Patient is an 88 year old gentleman with PMH including CP, celiac disease, HTN, urinary retention with recurrent UTIs s/p SPC placement now, CAD, s/p PCI (2018), AAA, CKD3, hypercalcemia, Dementia, normocytic anemia, recurrent atopic dermatitis, COPD who has resided at [facility] since 3/2019 where he moved from his group home d/t increased care needs.    On 9/15/20 nursing called to report patient was unresponsive and only slightly later appeared to have had a seizure.  EMS was called and patient was admitted to [facility] with sepsis which progressed quickly to septic shock thought 2/2 complicated UTI and possible aspiration PNA.  Eventually blood cultures grew Proteus, Enterococcus, Pseudamonas. He was transferred to ICU for pressor support, IV fluid resuscitation and IV antibiotics.  He suffered an RAJANI which resolved with treatment of his septic shock.  He was found to have a right frontal brain contusion and was too unstable for MRI.  As his sepsis improved, he was found to have worsening cognition and repeat CT showed growing of the contusion.  Prognosis was discussed with family who elected to move towards comfort cares.  [facility] was [facility] in [facility] and transferred back to [facility].    Updates on Status Since Skilled nursing Admission:   Patient is met today in his SNF room where he has family at bedside.  His son reports he appears comfortable but seems to be \"dry\" and likes the mouth spray more than than swabs.  He has also applied chapstick.    Diallo is not responsive but does have his eyes open at this time. He appears comfortable.  All non-comfort meds have been DC'd.      CODE STATUS/ADVANCE DIRECTIVES DISCUSSION:   DNR / DNI  Patient's living condition: lives in a skilled nursing facility  ALLERGIES: Gluten meal and Wheat extract  PAST MEDICAL HISTORY:  has a past medical history of AAA (abdominal aortic aneurysm) (H), Celiac sprue, and HTN (hypertension).  PAST SURGICAL HISTORY:   has a past surgical history that includes tonsillectomy and LEFT CAROTID ENDARTERECTOMY.  FAMILY HISTORY: family history is not on file.  SOCIAL HISTORY:   reports that he has quit smoking. He has never used smokeless tobacco. He reports that he does not drink alcohol.    Post Discharge Medication Reconciliation Status: discharge medications reconciled and changed, per note/orders    Current Outpatient Medications   Medication Sig Dispense Refill     morphine sulfate HIGH CONCENTRATE (ROXANOL) 10 mg/0.5 mL (HIGH CONC) solution Place 0.25 mLs (5 mg) under the tongue every hour as needed for moderate to severe pain 15 mL 0     acetaminophen (TYLENOL) 650 MG suppository Place 1 suppository (650 mg) rectally every 6 hours as needed for fever or mild pain (temperature over 100 F) 5 suppository 0     artificial saliva (BIOTENE MT) SOLN solution Take 2 mLs (2 sprays) by mouth every hour as needed for dry mouth 1 Bottle 0     artificial tears (GENTEAL) 0.1-0.2-0.3 % ophthalmic solution Place 1-2 drops into both eyes every 8 hours as needed for dry eyes 15 mL 0     atropine 1 % ophthalmic solution Place 1-2 drops into both eyes every hour as needed for other " (secretions) 15 mL 0     LORazepam (ATIVAN) 0.5 MG tablet Place 2 tablets (1 mg) under the tongue every 3 hours as needed for anxiety, seizures or nausea (dyspnea) 5 tablet 0     mineral oil-hydrophilic petrolatum (AQUAPHOR) external ointment Apply topically every 8 hours as needed for dry skin 198 g 0     ondansetron (ZOFRAN-ODT) 4 MG ODT tab Take 1 tablet (4 mg) by mouth every 6 hours as needed for nausea or vomiting 5 tablet 0       ROS:  Unobtainable secondary to cognitive impairment.     Vitals:  BP (!) 187/155   Pulse 56   Temp 97.6  F (36.4  C)   Resp 20   Wt 94.3 kg (207 lb 12.8 oz)   SpO2 (!) 86%   BMI 29.82 kg/m    Exam:  GENERAL APPEARANCE:  Alert but not responsive, in no distress, frail elderly man likely at end of life.   RESP:  respiratory effort normal, no respiratory distress  CV:  No LE peripheral edema  ABDOMEN:  nondistended  M/S:   Gait and station - in bed at today's visit, Digits and nails with baseline arthritic changes, significantly reduced muscle mass  SKIN:  Inspection and Palpation of skin and subcutaneous tissue pale, dry,   PSYCH:  insight and judgement, memory with impairment, does not follow commands      Lab/Diagnostic data:  CBC RESULTS:   Recent Labs   Lab Test 09/19/20  0633 09/18/20  0603   WBC 17.4* 22.3*   RBC 3.40* 3.40*   HGB 9.9* 9.9*   HCT 31.3* 30.3*   MCV 92 89   MCH 29.1 29.1   MCHC 31.6 32.7   RDW 15.7* 14.9   * 133*       Last Basic Metabolic Panel:  Recent Labs   Lab Test 09/19/20  1209 09/19/20  0633   * 146*   POTASSIUM 3.5 4.1   CHLORIDE 114* 113*   MARIN 8.7 9.0   CO2 22 25   BUN 13 14   CR 1.05 1.05   * 153*       Liver Function Studies -   Recent Labs   Lab Test 09/19/20  1209 09/16/20  0534   PROTTOTAL 5.5* 5.2*   ALBUMIN 2.2* 2.2*   BILITOTAL 1.1 1.9*   ALKPHOS 62 58   AST 29 28   ALT 23 14       TSH   Date Value Ref Range Status   09/15/2020 2.13 0.40 - 4.00 mU/L Final   03/15/2019 1.34 0.40 - 4.00 mU/L Final   ]    Lab Results    Component Value Date    A1C 5.5 07/13/2020       ASSESSMENT/PLAN:  Septic shock (H)  Bacteremia  Complicated UTI (urinary tract infection)  Aspiration pneumonitis (H)  Sepsis progressed quickly to Septic shock  Thought 2/2 complicated UTI and aspiration PNA   Significantly elevated lactic acid  Blood cultures grew out Proteus, Enterococcus, Pseudamonas  Treated with pressors, IV Fluid resuscitation, IV ANTIBIOTICS,   Sepsis improved but patient continued to decline d/t brain contusion - see below.    Care Conference and limited prognosis shared, family elected for comfort cares with hospice.     PLAN:  - enrolled in [facility]  - nursing for supportive cares     Acute renal failure superimposed on stage 3 chronic kidney disease, unspecified acute renal failure type  9/15/20: BUN 21, Creat 1.82, GFR 32  9/16/20: BUN 25, Creat 2.13, GFR 27  9/18/20: BUN 21, Creat 1.25, GFR 51  9/19/20: BUN 13, Creat 1.05, GFR 63    RAJANI 2/2 septic shock - treated with IVF resuscitation.      PLAN:  - comfort focus   - Will avoid nephrotoxic agents (such as ACEI/ARB/NSAIDs, IV contrast) and mindful prescribing with renal dosing.       Spastic hemiplegic cerebral palsy (H)  Seizure-like activity (H)  Encephalopathy  Contusion of brain with loss of consciousness, subsequent encounter   Hospice care patient  See above regarding sepsis.  As septic shock improved, encephalopathy did not and CT showing right frontal brain contusion which worsened by next CT scan.  Neurology consulted and noted limited prognosis, family elected to move towards comfort focus with admission to [facility].  Patient admitted to [facility] on 9/21/20 with terminal dx of Encephalopathy.     Comfort meds are in place - will clarify Morphine order.     PLAN:  - Appreciate [facility] team for their diligent care and attention  - Appreciate SNF staff for their diligent care and attention    Electronically signed  by:  ROCÍO Larsen CNP

## 2020-09-24 LAB
BACTERIA SPEC CULT: NO GROWTH
BACTERIA SPEC CULT: NO GROWTH
Lab: 1359
SPECIMEN SOURCE: NORMAL
SPECIMEN SOURCE: NORMAL

## 2023-09-29 NOTE — ED NOTES
[place] calling for update - provided with update pt will be admitted, provided brief info about ED visit and that pt family has been notified/informed of plan for admission. No further questions or concerns.    [place]

## (undated) RX ORDER — FENTANYL CITRATE 50 UG/ML
INJECTION, SOLUTION INTRAMUSCULAR; INTRAVENOUS
Status: DISPENSED
Start: 2020-01-01